# Patient Record
Sex: MALE | Race: WHITE | NOT HISPANIC OR LATINO | URBAN - METROPOLITAN AREA
[De-identification: names, ages, dates, MRNs, and addresses within clinical notes are randomized per-mention and may not be internally consistent; named-entity substitution may affect disease eponyms.]

---

## 2017-02-22 ENCOUNTER — EMERGENCY (EMERGENCY)
Facility: HOSPITAL | Age: 82
LOS: 1 days | Discharge: HOME | End: 2017-02-22
Attending: EMERGENCY MEDICINE | Admitting: EMERGENCY MEDICINE
Payer: COMMERCIAL

## 2017-02-22 VITALS
OXYGEN SATURATION: 99 % | TEMPERATURE: 98 F | HEART RATE: 70 BPM | SYSTOLIC BLOOD PRESSURE: 131 MMHG | RESPIRATION RATE: 18 BRPM | DIASTOLIC BLOOD PRESSURE: 83 MMHG

## 2017-02-22 VITALS
DIASTOLIC BLOOD PRESSURE: 71 MMHG | RESPIRATION RATE: 20 BRPM | SYSTOLIC BLOOD PRESSURE: 152 MMHG | HEART RATE: 81 BPM | TEMPERATURE: 98 F | OXYGEN SATURATION: 98 %

## 2017-02-22 DIAGNOSIS — I12.9 HYPERTENSIVE CHRONIC KIDNEY DISEASE WITH STAGE 1 THROUGH STAGE 4 CHRONIC KIDNEY DISEASE, OR UNSPECIFIED CHRONIC KIDNEY DISEASE: ICD-10-CM

## 2017-02-22 DIAGNOSIS — Z88.0 ALLERGY STATUS TO PENICILLIN: ICD-10-CM

## 2017-02-22 DIAGNOSIS — R55 SYNCOPE AND COLLAPSE: ICD-10-CM

## 2017-02-22 DIAGNOSIS — N18.9 CHRONIC KIDNEY DISEASE, UNSPECIFIED: ICD-10-CM

## 2017-02-22 LAB
ANION GAP SERPL CALC-SCNC: 8 MMOL/L — LOW (ref 9–16)
BUN SERPL-MCNC: 40 MG/DL — HIGH (ref 7–23)
CALCIUM SERPL-MCNC: 9.1 MG/DL — SIGNIFICANT CHANGE UP (ref 8.5–10.5)
CHLORIDE SERPL-SCNC: 107 MMOL/L — SIGNIFICANT CHANGE UP (ref 96–108)
CO2 SERPL-SCNC: 25 MMOL/L — SIGNIFICANT CHANGE UP (ref 22–31)
CREAT SERPL-MCNC: 2.02 MG/DL — HIGH (ref 0.5–1.3)
GLUCOSE SERPL-MCNC: 129 MG/DL — HIGH (ref 70–99)
HCT VFR BLD CALC: 35.2 % — LOW (ref 39–50)
HGB BLD-MCNC: 12.1 G/DL — LOW (ref 13–17)
MCHC RBC-ENTMCNC: 31.3 PG — SIGNIFICANT CHANGE UP (ref 27–34)
MCHC RBC-ENTMCNC: 34.4 G/DL — SIGNIFICANT CHANGE UP (ref 32–36)
MCV RBC AUTO: 91.2 FL — SIGNIFICANT CHANGE UP (ref 80–100)
PLATELET # BLD AUTO: 157 K/UL — SIGNIFICANT CHANGE UP (ref 150–400)
POTASSIUM SERPL-MCNC: 3.7 MMOL/L — SIGNIFICANT CHANGE UP (ref 3.5–5.3)
POTASSIUM SERPL-SCNC: 3.7 MMOL/L — SIGNIFICANT CHANGE UP (ref 3.5–5.3)
RBC # BLD: 3.86 M/UL — LOW (ref 4.2–5.8)
RBC # FLD: 13.3 % — SIGNIFICANT CHANGE UP (ref 10.3–16.9)
SODIUM SERPL-SCNC: 140 MMOL/L — SIGNIFICANT CHANGE UP (ref 135–145)
WBC # BLD: 7 K/UL — SIGNIFICANT CHANGE UP (ref 3.8–10.5)
WBC # FLD AUTO: 7 K/UL — SIGNIFICANT CHANGE UP (ref 3.8–10.5)

## 2017-02-22 PROCEDURE — 99284 EMERGENCY DEPT VISIT MOD MDM: CPT | Mod: 25

## 2017-02-22 PROCEDURE — 85027 COMPLETE CBC AUTOMATED: CPT

## 2017-02-22 PROCEDURE — 84484 ASSAY OF TROPONIN QUANT: CPT

## 2017-02-22 PROCEDURE — 36415 COLL VENOUS BLD VENIPUNCTURE: CPT

## 2017-02-22 PROCEDURE — 93005 ELECTROCARDIOGRAM TRACING: CPT | Mod: 76

## 2017-02-22 PROCEDURE — 80048 BASIC METABOLIC PNL TOTAL CA: CPT

## 2017-02-22 PROCEDURE — 93010 ELECTROCARDIOGRAM REPORT: CPT

## 2017-02-22 RX ORDER — SODIUM CHLORIDE 9 MG/ML
500 INJECTION INTRAMUSCULAR; INTRAVENOUS; SUBCUTANEOUS ONCE
Qty: 0 | Refills: 0 | Status: COMPLETED | OUTPATIENT
Start: 2017-02-22 | End: 2017-02-22

## 2017-02-22 RX ADMIN — SODIUM CHLORIDE 1000 MILLILITER(S): 9 INJECTION INTRAMUSCULAR; INTRAVENOUS; SUBCUTANEOUS at 16:24

## 2017-02-22 RX ADMIN — SODIUM CHLORIDE 1000 MILLILITER(S): 9 INJECTION INTRAMUSCULAR; INTRAVENOUS; SUBCUTANEOUS at 15:56

## 2017-02-22 NOTE — ED PROVIDER NOTE - PHYSICAL EXAMINATION
CONSTITUTIONAL: Well appearing, well nourished, awake, alert and in no apparent distress.  ENMT: Airway patent.  HEAD: Head is atraumatic. Head shape is symmetrical.  EYES: Clear bilaterally. Normal EOMI.  CARDIAC: Normal rate, regular rhythm.  Heart sounds S1, S2.   RESPIRATORY: Breath sounds clear and equal bilaterally.  GASTROINTESTINAL: Abdomen soft, non-tender, no guarding.  MUSCULOSKELETAL: Spine appears normal, range of motion is not limited, no muscle or joint tenderness.   NEUROLOGICAL: Alert and oriented, no focal deficits, no motor or sensory deficits.  SKIN: Skin normal color for race, warm, dry and intact. No evidence of rash.  PSYCHIATRIC: Alert and oriented to person, place, time/situation. normal mood and affect. no apparent risk to self or others.

## 2017-02-22 NOTE — ED PROVIDER NOTE - OBJECTIVE STATEMENT
81 yo male hx of HTN, CKD presenting with episode of syncope while hearing news of wife's diagnosis of brain tumor and need for surgery. Stated did not have much to eat this morning including breakfast. Felt slightly nauseas, was aware that he was about to pass out and felt lightgheaded. daughter with pt then had pt sit in chair, was placed in stretcher, shortly regaining consciousness after a few seconds. States feels b ack to baseline now after fluid hydration and eating. 83 yo male hx of HTN, CKD presenting with episode of syncope while hearing news of wife's diagnosis of brain tumor and need for surgery. Stated did not have much to eat this morning including breakfast. Felt slightly nauseas, was aware that he was about to pass out and felt lightgheaded. daughter with pt then had pt sit in chair, was placed in stretcher, shortly regaining consciousness after a few seconds. States feels b ack to baseline now after fluid hydration and eating. no cp, sob, no prior cardiac hx.

## 2017-02-22 NOTE — ED PROVIDER NOTE - MEDICAL DECISION MAKING DETAILS
Pt with episode of syncope while hearing news regarding wife, appearing prodromal in nature, back to baseline. No criteria for SF syncope rule. Recommended admission, pt refusing, Cr at baseline ~ 1.98 on prior records from daughter. Pt with episode of syncope while hearing news regarding wife, appearing prodromal in nature, back to baseline. No criteria for SF syncope rule. Recommended admission for continued monitoring, pt stating he prefers to be with wife at this time at [facility]. symptoms most suggestive of vasovagal syncope.  Cr at baseline ~ 1.98 on prior records from daughter. Tolerating po, ambulating, given IVF here, strict return precautions given.

## 2017-02-22 NOTE — ED ADULT NURSE NOTE - CHPI ED SYMPTOMS NEG
no nausea/no vomiting/no shortness of breath/no diaphoresis/no back pain/no fever/no cough/no chills

## 2018-08-07 ENCOUNTER — NON-APPOINTMENT (OUTPATIENT)
Age: 83
End: 2018-08-07

## 2018-08-07 ENCOUNTER — APPOINTMENT (OUTPATIENT)
Dept: INTERNAL MEDICINE | Facility: CLINIC | Age: 83
End: 2018-08-07
Payer: COMMERCIAL

## 2018-08-07 ENCOUNTER — LABORATORY RESULT (OUTPATIENT)
Age: 83
End: 2018-08-07

## 2018-08-07 VITALS
BODY MASS INDEX: 21.26 KG/M2 | OXYGEN SATURATION: 99 % | SYSTOLIC BLOOD PRESSURE: 136 MMHG | HEIGHT: 63 IN | WEIGHT: 120 LBS | TEMPERATURE: 98.2 F | HEART RATE: 66 BPM | DIASTOLIC BLOOD PRESSURE: 56 MMHG

## 2018-08-07 DIAGNOSIS — Z11.1 ENCOUNTER FOR SCREENING FOR RESPIRATORY TUBERCULOSIS: ICD-10-CM

## 2018-08-07 DIAGNOSIS — Z00.00 ENCOUNTER FOR GENERAL ADULT MEDICAL EXAMINATION W/OUT ABNORMAL FINDINGS: ICD-10-CM

## 2018-08-07 DIAGNOSIS — R07.89 OTHER CHEST PAIN: ICD-10-CM

## 2018-08-07 PROCEDURE — 36415 COLL VENOUS BLD VENIPUNCTURE: CPT

## 2018-08-07 PROCEDURE — 93000 ELECTROCARDIOGRAM COMPLETE: CPT

## 2018-08-07 PROCEDURE — 99387 INIT PM E/M NEW PAT 65+ YRS: CPT | Mod: 25

## 2018-08-07 PROCEDURE — 86580 TB INTRADERMAL TEST: CPT

## 2018-08-07 NOTE — HEALTH RISK ASSESSMENT
[Good] : ~his/her~  mood as  good [] : No [No falls in past year] : Patient reported no falls in the past year [0] : 2) Feeling down, depressed, or hopeless: Not at all (0) [Patient declined colonoscopy] : Patient declined colonoscopy [Hepatitis C test offered] : Hepatitis C test offered [Change in mental status noted] : No change in mental status noted [With Significant Other] : lives with significant other [Retired] : retired [] :  [# Of Children ___] : has [unfilled] children [Sexually Active] : not sexually active [Feels Safe at Home] : Feels safe at home [Fully functional (bathing, dressing, toileting, transferring, walking, feeding)] : Fully functional (bathing, dressing, toileting, transferring, walking, feeding) [Fully functional (using the telephone, shopping, preparing meals, housekeeping, doing laundry, using] : Fully functional and needs no help or supervision to perform IADLs (using the telephone, shopping, preparing meals, housekeeping, doing laundry, using transportation, managing medications and managing finances) [Reports changes in hearing] : Reports no changes in hearing [Reports changes in vision] : Reports no changes in vision [Reports changes in dental health] : Reports no changes in dental health [Smoke Detector] : smoke detector [Safety elements used in home] : safety elements used in home [Seat Belt] :  uses seat belt [Sunscreen] : uses sunscreen [TB Exposure] : is not being exposed to tuberculosis [Patient declined discussion] : Patient declined discussion

## 2018-08-07 NOTE — HISTORY OF PRESENT ILLNESS
[Family Member] : family member [FreeTextEntry1] : The this is an 84-year-old male who presents today to meet me as his primary internist in for initial examination. [de-identified] : Requires forms filled out to apply for assisted living housing

## 2018-08-07 NOTE — PHYSICAL EXAM
[No Acute Distress] : no acute distress [Well Nourished] : well nourished [Well Developed] : well developed [Well-Appearing] : well-appearing [Normal Sclera/Conjunctiva] : normal sclera/conjunctiva [PERRL] : pupils equal round and reactive to light [EOMI] : extraocular movements intact [Normal Outer Ear/Nose] : the outer ears and nose were normal in appearance [Normal Oropharynx] : the oropharynx was normal [No JVD] : no jugular venous distention [Supple] : supple [No Lymphadenopathy] : no lymphadenopathy [Thyroid Normal, No Nodules] : the thyroid was normal and there were no nodules present [No Respiratory Distress] : no respiratory distress  [Clear to Auscultation] : lungs were clear to auscultation bilaterally [No Accessory Muscle Use] : no accessory muscle use [Normal Rate] : normal rate  [Regular Rhythm] : with a regular rhythm [Normal S1, S2] : normal S1 and S2 [No Murmur] : no murmur heard [No Carotid Bruits] : no carotid bruits [No Varicosities] : no varicosities [No Extremity Clubbing/Cyanosis] : no extremity clubbing/cyanosis [Soft] : abdomen soft [Non Tender] : non-tender [Non-distended] : non-distended [No Masses] : no abdominal mass palpated [No HSM] : no HSM [Normal Bowel Sounds] : normal bowel sounds [Normal Posterior Cervical Nodes] : no posterior cervical lymphadenopathy [Normal Anterior Cervical Nodes] : no anterior cervical lymphadenopathy [No CVA Tenderness] : no CVA  tenderness [No Spinal Tenderness] : no spinal tenderness [No Joint Swelling] : no joint swelling [Grossly Normal Strength/Tone] : grossly normal strength/tone [No Rash] : no rash [Normal Gait] : normal gait [Coordination Grossly Intact] : coordination grossly intact [No Focal Deficits] : no focal deficits [Deep Tendon Reflexes (DTR)] : deep tendon reflexes were 2+ and symmetric [Normal Affect] : the affect was normal [Normal Insight/Judgement] : insight and judgment were intact [de-identified] : Poor dentition [de-identified] : 1+ pedal edema

## 2018-08-07 NOTE — ASSESSMENT
[FreeTextEntry1] : Health maintenance.\par His BMI is good and no weight loss is currently recommended.\par Low level daily aerobic exercise as tolerated they suggested.\par No STD risks or substance abuse per patient report.\par Hepatitis C antibody sent with patient approval.\par Occasional gender specific self-examination is suggested.\par Dermatologic followup for skin screening is suggested especially with history of prior squamous cell carcinoma.\par Patient opts out of future colonoscopy.\par No depression. Competent with ADLs.\par Patient states has completed pneumococcal vaccine series.\par Yearly flu vaccine and shingles vaccine are recommended.\par Application forms for assisted living housing completed.Her

## 2018-08-08 LAB
APPEARANCE: CLEAR
BASOPHILS # BLD AUTO: 0.01 K/UL
BASOPHILS NFR BLD AUTO: 0.2 %
BILIRUBIN URINE: NEGATIVE
BLOOD URINE: NEGATIVE
COLOR: YELLOW
EOSINOPHIL # BLD AUTO: 0.08 K/UL
EOSINOPHIL NFR BLD AUTO: 1.6 %
GLUCOSE QUALITATIVE U: NEGATIVE MG/DL
HBA1C MFR BLD HPLC: 5.5 %
HCT VFR BLD CALC: 30.9 %
HGB BLD-MCNC: 10.4 G/DL
IMM GRANULOCYTES NFR BLD AUTO: 0.4 %
KETONES URINE: NEGATIVE
LEUKOCYTE ESTERASE URINE: NEGATIVE
LYMPHOCYTES # BLD AUTO: 1.32 K/UL
LYMPHOCYTES NFR BLD AUTO: 26.4 %
MAN DIFF?: NORMAL
MCHC RBC-ENTMCNC: 32.1 PG
MCHC RBC-ENTMCNC: 33.7 GM/DL
MCV RBC AUTO: 95.4 FL
MONOCYTES # BLD AUTO: 0.29 K/UL
MONOCYTES NFR BLD AUTO: 5.8 %
NEUTROPHILS # BLD AUTO: 3.28 K/UL
NEUTROPHILS NFR BLD AUTO: 65.6 %
NITRITE URINE: NEGATIVE
PH URINE: 5
PLATELET # BLD AUTO: 166 K/UL
PROTEIN URINE: 30 MG/DL
PSA SERPL-MCNC: <0.01 NG/ML
RBC # BLD: 3.24 M/UL
RBC # FLD: 14.3 %
SPECIFIC GRAVITY URINE: 1.02
UROBILINOGEN URINE: NEGATIVE MG/DL
WBC # FLD AUTO: 5 K/UL

## 2018-08-09 LAB
ALBUMIN SERPL ELPH-MCNC: 4.4 G/DL
ALP BLD-CCNC: 76 U/L
ALT SERPL-CCNC: 14 U/L
ANION GAP SERPL CALC-SCNC: 17 MMOL/L
AST SERPL-CCNC: 19 U/L
BILIRUB SERPL-MCNC: 0.3 MG/DL
BUN SERPL-MCNC: 47 MG/DL
CALCIUM SERPL-MCNC: 9.6 MG/DL
CHLORIDE SERPL-SCNC: 102 MMOL/L
CHOLEST SERPL-MCNC: 162 MG/DL
CHOLEST/HDLC SERPL: 2.3 RATIO
CO2 SERPL-SCNC: 22 MMOL/L
CREAT SERPL-MCNC: 3.04 MG/DL
GLUCOSE SERPL-MCNC: 65 MG/DL
HDLC SERPL-MCNC: 72 MG/DL
LDLC SERPL CALC-MCNC: 66 MG/DL
POTASSIUM SERPL-SCNC: 4.1 MMOL/L
PROT SERPL-MCNC: 6.8 G/DL
SODIUM SERPL-SCNC: 141 MMOL/L
TRIGL SERPL-MCNC: 122 MG/DL

## 2018-09-10 ENCOUNTER — EMERGENCY (EMERGENCY)
Facility: HOSPITAL | Age: 83
LOS: 1 days | Discharge: HOME | End: 2018-09-10
Attending: EMERGENCY MEDICINE | Admitting: EMERGENCY MEDICINE
Payer: COMMERCIAL

## 2018-09-10 VITALS
HEART RATE: 71 BPM | RESPIRATION RATE: 18 BRPM | OXYGEN SATURATION: 96 % | TEMPERATURE: 98 F | DIASTOLIC BLOOD PRESSURE: 73 MMHG | SYSTOLIC BLOOD PRESSURE: 149 MMHG | WEIGHT: 123.02 LBS | HEIGHT: 67 IN

## 2018-09-10 VITALS
RESPIRATION RATE: 18 BRPM | SYSTOLIC BLOOD PRESSURE: 142 MMHG | DIASTOLIC BLOOD PRESSURE: 70 MMHG | OXYGEN SATURATION: 99 % | HEART RATE: 75 BPM | TEMPERATURE: 98 F
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
## 2018-09-10 DIAGNOSIS — Z23 ENCOUNTER FOR IMMUNIZATION: ICD-10-CM
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
## 2018-09-10 DIAGNOSIS — S01.81XA LACERATION WITHOUT FOREIGN BODY OF OTHER PART OF HEAD, INITIAL ENCOUNTER: ICD-10-CM

## 2018-09-10 DIAGNOSIS — Z88.0 ALLERGY STATUS TO PENICILLIN: ICD-10-CM

## 2018-09-10 DIAGNOSIS — S09.90XA UNSPECIFIED INJURY OF HEAD, INITIAL ENCOUNTER: ICD-10-CM

## 2018-09-10 PROCEDURE — 13132 CMPLX RPR F/C/C/M/N/AX/G/H/F: CPT

## 2018-09-10 PROCEDURE — 99284 EMERGENCY DEPT VISIT MOD MDM: CPT | Mod: 25

## 2018-09-10 PROCEDURE — 70450 CT HEAD/BRAIN W/O DYE: CPT | Mod: 26

## 2018-09-10 PROCEDURE — 90471 IMMUNIZATION ADMIN: CPT

## 2018-09-10 PROCEDURE — 70450 CT HEAD/BRAIN W/O DYE: CPT

## 2018-09-10 PROCEDURE — 90715 TDAP VACCINE 7 YRS/> IM: CPT

## 2018-09-10 RX ORDER — TETANUS TOXOID, REDUCED DIPHTHERIA TOXOID AND ACELLULAR PERTUSSIS VACCINE, ADSORBED 5; 2.5; 8; 8; 2.5 [IU]/.5ML; [IU]/.5ML; UG/.5ML; UG/.5ML; UG/.5ML
0.5 SUSPENSION INTRAMUSCULAR ONCE
Qty: 0 | Refills: 0 | Status: COMPLETED | OUTPATIENT
Start: 2018-09-10 | End: 2018-09-10

## 2018-09-10 RX ADMIN — TETANUS TOXOID, REDUCED DIPHTHERIA TOXOID AND ACELLULAR PERTUSSIS VACCINE, ADSORBED 0.5 MILLILITER(S): 5; 2.5; 8; 8; 2.5 SUSPENSION INTRAMUSCULAR at 17:14

## 2018-09-10 NOTE — ED PROVIDER NOTE - ENMT, MLM
Airway patent, Nasal mucosa clear. Mouth with normal mucosa. 4 cm crescent shaped lac through muscle layer right forehead above eyebrow

## 2018-09-10 NOTE — ED PROVIDER NOTE - OBJECTIVE STATEMENT
here with head injury.  States be bent forward to pick something up, lost balance, and fell forward hitting head on edge of shelf.  Bled a lot.  No loc.  Last tetanus unknown.  Denies other injury.  Notes dull frontal headache.  No vomiting.  Not on blood thinner

## 2018-09-10 NOTE — ED ADULT NURSE NOTE - NSIMPLEMENTINTERV_GEN_ALL_ED
Implemented All Universal Safety Interventions:  [place] to call system. Call bell, personal items and telephone within reach. Instruct patient to call for assistance. Room bathroom lighting operational. Non-slip footwear when patient is off stretcher. Physically safe environment: no spills, clutter or unnecessary equipment. Stretcher in lowest position, wheels locked, appropriate side rails in place.

## 2018-09-10 NOTE — ED PROVIDER NOTE - MEDICAL DECISION MAKING DETAILS
mechanical fall with larger forehead laceration.  offered plastic surgery repair but declined.  tetanus updated.  ct head neg for bleed.

## 2018-09-10 NOTE — ED ADULT NURSE NOTE - CAS DISCH TRANSFER METHOD
1.  Patient is to continue her current diet, medications and activity. 2.  Patient is to continue with her caretakers as she is doing. 3.  Patient is also to continue with her visiting nurses, physical therapy and Occupational Therapy.   4.  I will plan s
Private car

## 2018-09-10 NOTE — ED ADULT TRIAGE NOTE - OTHER COMPLAINTS
No LOC, no blood thinner. Pt C/O dull pain to head. Pt denies vomiting, vision change, dizziness, chest pain, SOB.

## 2018-09-10 NOTE — ED ADULT NURSE NOTE - OBJECTIVE STATEMENT
85 y/o male presents to ED c/o frontal head pain s/p head injury. Pt states, "I was bending down and hit my head on a metal shelf." Denies LOC. Denies blood thinner use. Denies deformity. Denies confusion.

## 2018-09-10 NOTE — ED ADULT NURSE NOTE - CHPI ED NUR SYMPTOMS NEG
no dizziness/no change in level of consciousness/no loss of consciousness/no syncope/no blurred vision/no nausea/no seizure/no vomiting/no weakness

## 2018-10-02 ENCOUNTER — APPOINTMENT (OUTPATIENT)
Dept: NEUROLOGY | Facility: CLINIC | Age: 83
End: 2018-10-02
Payer: COMMERCIAL

## 2018-10-02 VITALS
OXYGEN SATURATION: 99 % | HEART RATE: 62 BPM | WEIGHT: 120 LBS | DIASTOLIC BLOOD PRESSURE: 71 MMHG | SYSTOLIC BLOOD PRESSURE: 120 MMHG | BODY MASS INDEX: 21.26 KG/M2 | HEIGHT: 63 IN

## 2018-10-02 PROCEDURE — 99205 OFFICE O/P NEW HI 60 MIN: CPT

## 2018-10-03 LAB
25(OH)D3 SERPL-MCNC: 31.7 NG/ML
FOLATE SERPL-MCNC: 9.8 NG/ML
T PALLIDUM AB SER QL IA: NEGATIVE
TSH SERPL-ACNC: 1.67 UIU/ML
VIT B12 SERPL-MCNC: 561 PG/ML

## 2018-10-05 LAB
COPPER SERPL-MCNC: 92 UG/DL
ZINC SERPL-MCNC: 84 UG/DL

## 2018-10-09 LAB
A-TOCOPHEROL VIT E SERPL-MCNC: 12.5 MG/L
BETA+GAMMA TOCOPHEROL SERPL-MCNC: 0.7 MG/L
HOMOCYSTEINE LEVEL: 39.3 UMOL/L
METHYLMALONIC ACID LEVEL: 307 NMOL/L
NICOTINAMIDE: 63.2 NG/ML
NICOTINIC ACID: <5 NG/ML
VIT B1 SERPL-MCNC: 89.4 NMOL/L
VIT B6 SERPL-MCNC: 5.8 UG/L

## 2018-12-04 ENCOUNTER — APPOINTMENT (OUTPATIENT)
Dept: NEUROLOGY | Facility: CLINIC | Age: 83
End: 2018-12-04
Payer: COMMERCIAL

## 2018-12-04 VITALS
TEMPERATURE: 97.7 F | BODY MASS INDEX: 21.97 KG/M2 | SYSTOLIC BLOOD PRESSURE: 120 MMHG | WEIGHT: 124 LBS | DIASTOLIC BLOOD PRESSURE: 69 MMHG | OXYGEN SATURATION: 99 % | HEIGHT: 63 IN | HEART RATE: 71 BPM

## 2018-12-04 PROCEDURE — 99215 OFFICE O/P EST HI 40 MIN: CPT

## 2018-12-04 RX ORDER — AMLODIPINE BESYLATE 5 MG/1
5 TABLET ORAL DAILY
Refills: 0 | Status: ACTIVE | COMMUNITY
Start: 2018-08-07

## 2018-12-04 RX ORDER — ATORVASTATIN CALCIUM 10 MG/1
10 TABLET, FILM COATED ORAL DAILY
Refills: 0 | Status: ACTIVE | COMMUNITY
Start: 2018-08-07

## 2018-12-04 RX ORDER — FUROSEMIDE 20 MG/1
20 TABLET ORAL DAILY
Refills: 0 | Status: ACTIVE | COMMUNITY
Start: 2018-08-07

## 2019-04-04 ENCOUNTER — APPOINTMENT (OUTPATIENT)
Dept: NEUROLOGY | Facility: CLINIC | Age: 84
End: 2019-04-04
Payer: COMMERCIAL

## 2019-04-04 VITALS — DIASTOLIC BLOOD PRESSURE: 66 MMHG | SYSTOLIC BLOOD PRESSURE: 135 MMHG

## 2019-04-04 VITALS — OXYGEN SATURATION: 98 % | HEART RATE: 70 BPM | WEIGHT: 122 LBS | TEMPERATURE: 97.7 F | BODY MASS INDEX: 21.61 KG/M2

## 2019-04-04 DIAGNOSIS — L98.9 DISORDER OF THE SKIN AND SUBCUTANEOUS TISSUE, UNSPECIFIED: ICD-10-CM

## 2019-04-04 PROCEDURE — 99214 OFFICE O/P EST MOD 30 MIN: CPT

## 2019-04-04 NOTE — HISTORY OF PRESENT ILLNESS
[FreeTextEntry1] : Son is worried about a recent fall and loss of weight, although pt states appetite is good. No side effects from aricept, although son is not sure how well it is working. He reports fluctuations in cognition, mostly worse at night but fluctuates more than that as well. His son is concerned that he tries to get his wife to walk (who has been bedbound for over 9 months with contractures).\par

## 2019-04-04 NOTE — PHYSICAL EXAM
[General Appearance - Alert] : alert [General Appearance - In No Acute Distress] : in no acute distress [Oriented To Time, Place, And Person] : oriented to person, place, and time [Impaired Insight] : insight and judgment were intact [Affect] : the affect was normal [Concentration Intact] : normal concentrating ability [Fluency] : fluency intact [Comprehension] : comprehension intact [Vocabulary] : adequate range of vocabulary [Cranial Nerves Optic (II)] : visual acuity intact bilaterally,  visual fields full to confrontation, pupils equal round and reactive to light [Cranial Nerves Oculomotor (III)] : extraocular motion intact [Cranial Nerves Trigeminal (V)] : facial sensation intact symmetrically [Cranial Nerves Facial (VII)] : face symmetrical [Cranial Nerves Vestibulocochlear (VIII)] : hearing was intact bilaterally [Cranial Nerves Glossopharyngeal (IX)] : tongue and palate midline [Cranial Nerves Accessory (XI - Cranial And Spinal)] : head turning and shoulder shrug symmetric [Cranial Nerves Hypoglossal (XII)] : there was no tongue deviation with protrusion [Motor Tone] : muscle tone was normal in all four extremities [Motor Strength] : muscle strength was normal in all four extremities [Involuntary Movements] : no involuntary movements were seen [No Muscle Atrophy] : normal bulk in all four extremities [Romberg's Sign] : Romberg's sign was negtive [Abnormal Walk] : normal gait [Balance] : balance was intact [Past-pointing] : there was no past-pointing [Tremor] : no tremor present [Coordination - Dysmetria Impaired Finger-to-Nose Bilateral] : not present [1+] : Ankle jerk left 1+ [FreeTextEntry1] : lesion on left upper eyelid / brow with mild medial hyperpigmentation; flatter more hyperpigmented lesion on lleft lower eyelid

## 2019-04-04 NOTE — ASSESSMENT
[FreeTextEntry1] : Increase donepezil to 23mg daily at bedtime\par Refer to dermatology for skin lesion \par Counseled on not trying to walk with wife as it is not safe for her or for him\par Return in 3-4 months

## 2019-07-11 ENCOUNTER — APPOINTMENT (OUTPATIENT)
Dept: NEUROLOGY | Facility: CLINIC | Age: 84
End: 2019-07-11

## 2019-09-18 ENCOUNTER — RX RENEWAL (OUTPATIENT)
Age: 84
End: 2019-09-18

## 2020-01-02 ENCOUNTER — RX RENEWAL (OUTPATIENT)
Age: 85
End: 2020-01-02

## 2020-01-15 RX ORDER — DONEPEZIL HYDROCHLORIDE 23 MG/1
23 TABLET, FILM COATED ORAL
Qty: 30 | Refills: 5 | Status: DISCONTINUED | COMMUNITY
Start: 2018-10-02 | End: 2020-01-15

## 2020-02-08 ENCOUNTER — INPATIENT (INPATIENT)
Facility: HOSPITAL | Age: 85
LOS: 0 days | Discharge: HOME | DRG: 312 | End: 2020-02-09
Attending: INTERNAL MEDICINE | Admitting: INTERNAL MEDICINE
Payer: COMMERCIAL

## 2020-02-08 VITALS
RESPIRATION RATE: 18 BRPM | SYSTOLIC BLOOD PRESSURE: 145 MMHG | TEMPERATURE: 98 F | OXYGEN SATURATION: 99 % | DIASTOLIC BLOOD PRESSURE: 71 MMHG | HEART RATE: 72 BPM

## 2020-02-08 DIAGNOSIS — G30.9 ALZHEIMER'S DISEASE, UNSPECIFIED: ICD-10-CM

## 2020-02-08 DIAGNOSIS — R55 SYNCOPE AND COLLAPSE: ICD-10-CM

## 2020-02-08 DIAGNOSIS — R56.9 UNSPECIFIED CONVULSIONS: ICD-10-CM

## 2020-02-08 DIAGNOSIS — E78.49 OTHER HYPERLIPIDEMIA: ICD-10-CM

## 2020-02-08 DIAGNOSIS — I10 ESSENTIAL (PRIMARY) HYPERTENSION: ICD-10-CM

## 2020-02-08 DIAGNOSIS — Z85.46 PERSONAL HISTORY OF MALIGNANT NEOPLASM OF PROSTATE: Chronic | ICD-10-CM

## 2020-02-08 DIAGNOSIS — N18.9 CHRONIC KIDNEY DISEASE, UNSPECIFIED: ICD-10-CM

## 2020-02-08 LAB
ALBUMIN SERPL ELPH-MCNC: 3.9 G/DL — SIGNIFICANT CHANGE UP (ref 3.3–5)
ALBUMIN SERPL ELPH-MCNC: 4 G/DL — SIGNIFICANT CHANGE UP (ref 3.3–5)
ALP SERPL-CCNC: 80 U/L — SIGNIFICANT CHANGE UP (ref 40–120)
ALP SERPL-CCNC: SIGNIFICANT CHANGE UP U/L (ref 40–120)
ALT FLD-CCNC: 15 U/L — SIGNIFICANT CHANGE UP (ref 10–45)
ALT FLD-CCNC: SIGNIFICANT CHANGE UP U/L (ref 10–45)
ANION GAP SERPL CALC-SCNC: 11 MMOL/L — SIGNIFICANT CHANGE UP (ref 5–17)
ANION GAP SERPL CALC-SCNC: 14 MMOL/L — SIGNIFICANT CHANGE UP (ref 5–17)
APTT BLD: 22.6 SEC — LOW (ref 27.5–36.3)
AST SERPL-CCNC: 17 U/L — SIGNIFICANT CHANGE UP (ref 10–40)
AST SERPL-CCNC: SIGNIFICANT CHANGE UP U/L (ref 10–40)
BASOPHILS # BLD AUTO: 0.04 K/UL — SIGNIFICANT CHANGE UP (ref 0–0.2)
BASOPHILS NFR BLD AUTO: 0.7 % — SIGNIFICANT CHANGE UP (ref 0–2)
BILIRUB SERPL-MCNC: 0.3 MG/DL — SIGNIFICANT CHANGE UP (ref 0.2–1.2)
BILIRUB SERPL-MCNC: 0.3 MG/DL — SIGNIFICANT CHANGE UP (ref 0.2–1.2)
BUN SERPL-MCNC: 49 MG/DL — HIGH (ref 7–23)
BUN SERPL-MCNC: 52 MG/DL — HIGH (ref 7–23)
CALCIUM SERPL-MCNC: 8.9 MG/DL — SIGNIFICANT CHANGE UP (ref 8.4–10.5)
CALCIUM SERPL-MCNC: 9.3 MG/DL — SIGNIFICANT CHANGE UP (ref 8.4–10.5)
CHLORIDE SERPL-SCNC: 106 MMOL/L — SIGNIFICANT CHANGE UP (ref 96–108)
CHLORIDE SERPL-SCNC: 108 MMOL/L — SIGNIFICANT CHANGE UP (ref 96–108)
CO2 SERPL-SCNC: 20 MMOL/L — LOW (ref 22–31)
CO2 SERPL-SCNC: 22 MMOL/L — SIGNIFICANT CHANGE UP (ref 22–31)
CREAT SERPL-MCNC: 3.48 MG/DL — HIGH (ref 0.5–1.3)
CREAT SERPL-MCNC: 3.62 MG/DL — HIGH (ref 0.5–1.3)
EOSINOPHIL # BLD AUTO: 0.09 K/UL — SIGNIFICANT CHANGE UP (ref 0–0.5)
EOSINOPHIL NFR BLD AUTO: 1.6 % — SIGNIFICANT CHANGE UP (ref 0–6)
GLUCOSE SERPL-MCNC: 109 MG/DL — HIGH (ref 70–99)
GLUCOSE SERPL-MCNC: 140 MG/DL — HIGH (ref 70–99)
HCT VFR BLD CALC: 29.9 % — LOW (ref 39–50)
HGB BLD-MCNC: 9.9 G/DL — LOW (ref 13–17)
IMM GRANULOCYTES NFR BLD AUTO: 0.7 % — SIGNIFICANT CHANGE UP (ref 0–1.5)
INR BLD: 0.94 — SIGNIFICANT CHANGE UP (ref 0.88–1.16)
LYMPHOCYTES # BLD AUTO: 1.31 K/UL — SIGNIFICANT CHANGE UP (ref 1–3.3)
LYMPHOCYTES # BLD AUTO: 22.7 % — SIGNIFICANT CHANGE UP (ref 13–44)
MCHC RBC-ENTMCNC: 32.5 PG — SIGNIFICANT CHANGE UP (ref 27–34)
MCHC RBC-ENTMCNC: 33.1 GM/DL — SIGNIFICANT CHANGE UP (ref 32–36)
MCV RBC AUTO: 98 FL — SIGNIFICANT CHANGE UP (ref 80–100)
MONOCYTES # BLD AUTO: 0.37 K/UL — SIGNIFICANT CHANGE UP (ref 0–0.9)
MONOCYTES NFR BLD AUTO: 6.4 % — SIGNIFICANT CHANGE UP (ref 2–14)
NEUTROPHILS # BLD AUTO: 3.92 K/UL — SIGNIFICANT CHANGE UP (ref 1.8–7.4)
NEUTROPHILS NFR BLD AUTO: 67.9 % — SIGNIFICANT CHANGE UP (ref 43–77)
NRBC # BLD: 0 /100 WBCS — SIGNIFICANT CHANGE UP (ref 0–0)
NT-PROBNP SERPL-SCNC: 504 PG/ML — HIGH (ref 0–300)
PLATELET # BLD AUTO: 183 K/UL — SIGNIFICANT CHANGE UP (ref 150–400)
POTASSIUM SERPL-MCNC: 5 MMOL/L — SIGNIFICANT CHANGE UP (ref 3.5–5.3)
POTASSIUM SERPL-MCNC: SIGNIFICANT CHANGE UP MMOL/L (ref 3.5–5.3)
POTASSIUM SERPL-SCNC: 5 MMOL/L — SIGNIFICANT CHANGE UP (ref 3.5–5.3)
POTASSIUM SERPL-SCNC: SIGNIFICANT CHANGE UP MMOL/L (ref 3.5–5.3)
PROT SERPL-MCNC: 6.4 G/DL — SIGNIFICANT CHANGE UP (ref 6–8.3)
PROT SERPL-MCNC: 6.5 G/DL — SIGNIFICANT CHANGE UP (ref 6–8.3)
PROTHROM AB SERPL-ACNC: 10.7 SEC — SIGNIFICANT CHANGE UP (ref 10–12.9)
RBC # BLD: 3.05 M/UL — LOW (ref 4.2–5.8)
RBC # FLD: 13.5 % — SIGNIFICANT CHANGE UP (ref 10.3–14.5)
SODIUM SERPL-SCNC: 140 MMOL/L — SIGNIFICANT CHANGE UP (ref 135–145)
SODIUM SERPL-SCNC: 141 MMOL/L — SIGNIFICANT CHANGE UP (ref 135–145)
TROPONIN T SERPL-MCNC: <0.01 NG/ML — SIGNIFICANT CHANGE UP (ref 0–0.01)
WBC # BLD: 5.77 K/UL — SIGNIFICANT CHANGE UP (ref 3.8–10.5)
WBC # FLD AUTO: 5.77 K/UL — SIGNIFICANT CHANGE UP (ref 3.8–10.5)

## 2020-02-08 PROCEDURE — 93306 TTE W/DOPPLER COMPLETE: CPT | Mod: 26

## 2020-02-08 PROCEDURE — 93010 ELECTROCARDIOGRAM REPORT: CPT

## 2020-02-08 PROCEDURE — 95813 EEG EXTND MNTR 61-119 MIN: CPT | Mod: 26

## 2020-02-08 PROCEDURE — 99221 1ST HOSP IP/OBS SF/LOW 40: CPT

## 2020-02-08 PROCEDURE — 99285 EMERGENCY DEPT VISIT HI MDM: CPT

## 2020-02-08 PROCEDURE — 70450 CT HEAD/BRAIN W/O DYE: CPT | Mod: 26

## 2020-02-08 PROCEDURE — 71045 X-RAY EXAM CHEST 1 VIEW: CPT | Mod: 26

## 2020-02-08 RX ORDER — ATORVASTATIN CALCIUM 80 MG/1
10 TABLET, FILM COATED ORAL AT BEDTIME
Refills: 0 | Status: DISCONTINUED | OUTPATIENT
Start: 2020-02-08 | End: 2020-02-09

## 2020-02-08 RX ORDER — AMLODIPINE BESYLATE 2.5 MG/1
5 TABLET ORAL ONCE
Refills: 0 | Status: COMPLETED | OUTPATIENT
Start: 2020-02-08 | End: 2020-02-08

## 2020-02-08 RX ORDER — SODIUM CHLORIDE 9 MG/ML
1000 INJECTION INTRAMUSCULAR; INTRAVENOUS; SUBCUTANEOUS ONCE
Refills: 0 | Status: COMPLETED | OUTPATIENT
Start: 2020-02-08 | End: 2020-02-08

## 2020-02-08 RX ORDER — INFLUENZA VIRUS VACCINE 15; 15; 15; 15 UG/.5ML; UG/.5ML; UG/.5ML; UG/.5ML
0.5 SUSPENSION INTRAMUSCULAR ONCE
Refills: 0 | Status: DISCONTINUED | OUTPATIENT
Start: 2020-02-08 | End: 2020-02-09

## 2020-02-08 RX ORDER — SODIUM CHLORIDE 9 MG/ML
1000 INJECTION INTRAMUSCULAR; INTRAVENOUS; SUBCUTANEOUS
Refills: 0 | Status: DISCONTINUED | OUTPATIENT
Start: 2020-02-08 | End: 2020-02-09

## 2020-02-08 RX ORDER — AMLODIPINE BESYLATE 2.5 MG/1
5 TABLET ORAL DAILY
Refills: 0 | Status: DISCONTINUED | OUTPATIENT
Start: 2020-02-08 | End: 2020-02-09

## 2020-02-08 RX ADMIN — AMLODIPINE BESYLATE 5 MILLIGRAM(S): 2.5 TABLET ORAL at 20:33

## 2020-02-08 RX ADMIN — ATORVASTATIN CALCIUM 10 MILLIGRAM(S): 80 TABLET, FILM COATED ORAL at 21:38

## 2020-02-08 RX ADMIN — SODIUM CHLORIDE 75 MILLILITER(S): 9 INJECTION INTRAMUSCULAR; INTRAVENOUS; SUBCUTANEOUS at 19:12

## 2020-02-08 RX ADMIN — SODIUM CHLORIDE 500 MILLILITER(S): 9 INJECTION INTRAMUSCULAR; INTRAVENOUS; SUBCUTANEOUS at 11:40

## 2020-02-08 RX ADMIN — AMLODIPINE BESYLATE 5 MILLIGRAM(S): 2.5 TABLET ORAL at 21:38

## 2020-02-08 NOTE — H&P ADULT - ATTENDING COMMENTS
On Date 2/8/20  Assessment: Patient personally seen and examined myself during rounds, face-to-face, with the NPP     Note read, including vitals, physical findings, laboratory data, and radiological reports.   Agree with above with revisions, if any included below.   - My exam:  General: WN/WD NAD  Neurology: A&Ox3, nonfocal, KRUEGER x 4  Head:  Normocephalic, atraumatic  ENT:  Mucosa moist, no ulcerations  Neck:  Supple, no sinuses or palpable masses  Lymphatic:  No palpable cervical, supraclavicular, axillary or inguinal adenopathy  Respiratory: CTA B/L  CV: RRR, S1S2, no murmur  Abdominal: Soft, NT, ND no palpable mass  MSK: No edema, + peripheral pulses, FROM all 4 extremity  Incisions: intact, no erythema or drainage    - My Plan of care:   Continuous telemetry monitoring, frequent hemodynamic checks, daily weights, I/Os, daily 12 Lead ECG, add K and Mg to labs, cycle troponin to peak, consider Adv HF / EP / CTS / Interv. Cardio consultation if intervention is needed.

## 2020-02-08 NOTE — H&P ADULT - NSHPPHYSICALEXAM_GEN_ALL_CORE
GENERAL: in no acute distress  HEAD/NECK: supple, no JVD, no bruits  Skin: multiple keratotic lesions noted on the abdomen  LUNGS: CTA A/P BL  HEART: S1S2 RRR  ABD: + BS, soft, NT/ND  EXT: no edema/cyanosis  AxO x 2 (to himself and location), CN II-XII intact

## 2020-02-08 NOTE — ED ADULT NURSE NOTE - OBJECTIVE STATEMENT
Pt presents to ED complaining of syncope. As per pt's home health aide, "He was sitting in a chair and he started to shake and he passed out". Did not fall, did not hit head. Pt A&Ox2 at baseline. Pt able to move all extremities, able to follow commands/ answer questions. Skin intact, no obvious injuries or deformities noted. Denies pain, dizziness, sob.

## 2020-02-08 NOTE — ED ADULT TRIAGE NOTE - CHIEF COMPLAINT QUOTE
Patient biba from home with Aide s/p syncopal event. Per EMS, patient was seated and had a 30 second syncopal episodes. EMS states his arm got tense and he started shaking prior to LOC. Patient slumped over in seat without hitting his head.

## 2020-02-08 NOTE — H&P ADULT - NSHPOUTPATIENTPROVIDERS_GEN_ALL_CORE
[facility] [phone]  Dr. Olayinka Melendez (nephrology) in [place] (has not seen him in two years)

## 2020-02-08 NOTE — H&P ADULT - HISTORY OF PRESENT ILLNESS
This is an 84 yo male with Alzheimer's (Aricept was discontinued two weeks ago due to vomiting reaction), AxO x 2 (to name and location), Prostate CA in past, HTN, hyperlipidemia, Squamous Cell Carcinoma, CRI (baseline Cr 3.04) (one kidney smaller than another, has not seen a nephrologist since he moved from [facility] a few years ago), takes Furosemide daily however family doesn't know why, who was brought in by his daughter to ED 2/8/20 after pt had an episode of syncope at home. According to his aid, pt became dizzy after he had breakfast and stood up. She thought his left side of the body was "convulsing". He was out for 5-10 seconds. Denies CP, SOB, diaphoresis, headache, abd pain, back pain, weakness, paralysis, sleepiness  In ED he underwent CT head: < from: CT Head No Cont (02.08.20 @ 11:53) >  No acute intracranial hemorrhage, mass effect, or calvarial fracture.   EKG c/w NSR without ischemic changes. Trop negative. Cr 3.62  Echo done bedside in ED: < from: Echocardiogram (02.08.20 @ 13:17) >   1. The left ventricle is normal in size, wall thickness, and systolic function with a calculated ejection fraction of 55%.   2. The right ventricle is normal in size. Right ventricular systolic function is normal.   3. The aortic valve is mildly thickened.   4. There is mild-to-moderate mitral regurgitation.   5. There is mild tricuspid regurgitation.   6. There is no echocardiographic evidence of pulmonary hypertension.   7. There is trace pulmonic regurgitation.   8. No pericardial effusion is seen.      Pt received 1L NS bolus for suspected dehydration and hypovolemia.   Pt is being admitted to 5U telemetry for observation for diagnosis of syncope

## 2020-02-08 NOTE — H&P ADULT - PROBLEM SELECTOR PLAN 5
left message with neurologist Dr. Faust for evaluation of the pt while he is in the hospital pt's neurologist Dr. Faust  - contacting neurology on call for advance dementia

## 2020-02-08 NOTE — ED PROVIDER NOTE - CLINICAL SUMMARY MEDICAL DECISION MAKING FREE TEXT BOX
Pt with unexplained syncope, ekg ok, trop neg, labs with worsening kidney disease vs dehydration, received gentle hydration - ct head no acute findings -discussed with Dr. Aaron who agrees on admission for syncope workup.  Beside official echo in ED.

## 2020-02-08 NOTE — H&P ADULT - ASSESSMENT
86 yo male with Alzheimer's (Aricept 10 mg po daily was discontinued two weeks ago due to vomiting reaction), AxO x 2 (to name and location), Prostate CA in past, HTN, hyperlipidemia, Squamous Cell Carcinoma, CRI (baseline Cr 3.04) (one kidney smaller than another, has not seen a nephrologist since he moved from [facility] a few years ago), takes Furosemide daily however family doesn't know why, who was brought in by his daughter to ED 2/8/20 after pt had an episode of syncope at home. According to his aid, pt became dizzy after he had breakfast and stood up. She thought his left side of the body was "convulsing". He was out for 5-10 seconds. Denies CP, SOB, diaphoresis, headache, abd pain, back pain, weakness, paralysis, sleepiness  In ED he underwent CT head: < from: CT Head No Cont (02.08.20 @ 11:53) >  No acute intracranial hemorrhage, mass effect, or calvarial fracture.   EKG c/w NSR without ischemic changes. Trop negative. Cr 3.62  Echo: normal EF, no wall motion abnormality, no pulm hypertension or pericardial effusion      Pt received 1L NS bolus for suspected dehydration and hypovolemia.   Pt is being admitted to 5U telemetry for observation for diagnosis of syncope    Diet: low cholesterol, low fat  DVT prophylaxis  Ambulate  PT evaluation

## 2020-02-08 NOTE — H&P ADULT - NSICDXPASTMEDICALHX_GEN_ALL_CORE_FT
PAST MEDICAL HISTORY:  Alzheimer disease     Cancer of skin, squamous cell     Chronic kidney disease, unspecified CKD stage     HTN (hypertension)     Hyperlipidemia     No pertinent past medical history     Prostate CA

## 2020-02-08 NOTE — ED PROVIDER NOTE - OBJECTIVE STATEMENT
86 y/o m with PMH of dementia, htn, hld presents to ED with syncope episode.  According to aid, pt had just finished eating and stood up and became dizzy and syncopized for 5-10 seconds.  When pt woke up he was back to baseline.  No incontinence, no sleepiness.  No fever or infectious symptoms.  Pt denies complaints in ED and aid states he is at his baseline.

## 2020-02-09 ENCOUNTER — TRANSCRIPTION ENCOUNTER (OUTPATIENT)
Age: 85
End: 2020-02-09

## 2020-02-09 VITALS — TEMPERATURE: 98 F

## 2020-02-09 LAB
ANION GAP SERPL CALC-SCNC: 10 MMOL/L — SIGNIFICANT CHANGE UP (ref 5–17)
APPEARANCE UR: CLEAR — SIGNIFICANT CHANGE UP
APTT BLD: 28.6 SEC — SIGNIFICANT CHANGE UP (ref 27.5–36.3)
BACTERIA # UR AUTO: SIGNIFICANT CHANGE UP /HPF
BILIRUB UR-MCNC: NEGATIVE — SIGNIFICANT CHANGE UP
BUN SERPL-MCNC: 48 MG/DL — HIGH (ref 7–23)
CALCIUM SERPL-MCNC: 9.2 MG/DL — SIGNIFICANT CHANGE UP (ref 8.4–10.5)
CHLORIDE SERPL-SCNC: 108 MMOL/L — SIGNIFICANT CHANGE UP (ref 96–108)
CHOLEST SERPL-MCNC: 168 MG/DL — SIGNIFICANT CHANGE UP (ref 10–199)
CO2 SERPL-SCNC: 20 MMOL/L — LOW (ref 22–31)
COLOR SPEC: YELLOW — SIGNIFICANT CHANGE UP
CREAT SERPL-MCNC: 3.44 MG/DL — HIGH (ref 0.5–1.3)
DIFF PNL FLD: NEGATIVE — SIGNIFICANT CHANGE UP
EPI CELLS # UR: SIGNIFICANT CHANGE UP /HPF (ref 0–5)
GLUCOSE SERPL-MCNC: 84 MG/DL — SIGNIFICANT CHANGE UP (ref 70–99)
GLUCOSE UR QL: NEGATIVE — SIGNIFICANT CHANGE UP
HBA1C BLD-MCNC: 5 % — SIGNIFICANT CHANGE UP (ref 4–5.6)
HCT VFR BLD CALC: 28.1 % — LOW (ref 39–50)
HDLC SERPL-MCNC: 70 MG/DL — SIGNIFICANT CHANGE UP
HGB BLD-MCNC: 9.1 G/DL — LOW (ref 13–17)
INR BLD: 0.96 — SIGNIFICANT CHANGE UP (ref 0.88–1.16)
KETONES UR-MCNC: NEGATIVE — SIGNIFICANT CHANGE UP
LEUKOCYTE ESTERASE UR-ACNC: NEGATIVE — SIGNIFICANT CHANGE UP
LIPID PNL WITH DIRECT LDL SERPL: 81 MG/DL — SIGNIFICANT CHANGE UP
MAGNESIUM SERPL-MCNC: 2.2 MG/DL — SIGNIFICANT CHANGE UP (ref 1.6–2.6)
MCHC RBC-ENTMCNC: 32 PG — SIGNIFICANT CHANGE UP (ref 27–34)
MCHC RBC-ENTMCNC: 32.4 GM/DL — SIGNIFICANT CHANGE UP (ref 32–36)
MCV RBC AUTO: 98.9 FL — SIGNIFICANT CHANGE UP (ref 80–100)
NITRITE UR-MCNC: NEGATIVE — SIGNIFICANT CHANGE UP
NRBC # BLD: 0 /100 WBCS — SIGNIFICANT CHANGE UP (ref 0–0)
PH UR: 7 — SIGNIFICANT CHANGE UP (ref 5–8)
PLATELET # BLD AUTO: 165 K/UL — SIGNIFICANT CHANGE UP (ref 150–400)
POTASSIUM SERPL-MCNC: 4.2 MMOL/L — SIGNIFICANT CHANGE UP (ref 3.5–5.3)
POTASSIUM SERPL-SCNC: 4.2 MMOL/L — SIGNIFICANT CHANGE UP (ref 3.5–5.3)
PROT UR-MCNC: ABNORMAL MG/DL
PROTHROM AB SERPL-ACNC: 10.9 SEC — SIGNIFICANT CHANGE UP (ref 10–12.9)
RBC # BLD: 2.84 M/UL — LOW (ref 4.2–5.8)
RBC # FLD: 13.3 % — SIGNIFICANT CHANGE UP (ref 10.3–14.5)
RBC CASTS # UR COMP ASSIST: < 5 /HPF — SIGNIFICANT CHANGE UP
SODIUM SERPL-SCNC: 138 MMOL/L — SIGNIFICANT CHANGE UP (ref 135–145)
SP GR SPEC: 1.01 — SIGNIFICANT CHANGE UP (ref 1–1.03)
TOTAL CHOLESTEROL/HDL RATIO MEASUREMENT: 2.4 RATIO — LOW (ref 3.4–9.6)
TRIGL SERPL-MCNC: 84 MG/DL — SIGNIFICANT CHANGE UP (ref 10–149)
TROPONIN T SERPL-MCNC: 0.01 NG/ML — SIGNIFICANT CHANGE UP (ref 0–0.01)
TSH SERPL-MCNC: 1.29 UIU/ML — SIGNIFICANT CHANGE UP (ref 0.35–4.94)
UROBILINOGEN FLD QL: 0.2 E.U./DL — SIGNIFICANT CHANGE UP
WBC # BLD: 6.07 K/UL — SIGNIFICANT CHANGE UP (ref 3.8–10.5)
WBC # FLD AUTO: 6.07 K/UL — SIGNIFICANT CHANGE UP (ref 3.8–10.5)
WBC UR QL: < 5 /HPF — SIGNIFICANT CHANGE UP

## 2020-02-09 PROCEDURE — 85730 THROMBOPLASTIN TIME PARTIAL: CPT

## 2020-02-09 PROCEDURE — 84484 ASSAY OF TROPONIN QUANT: CPT

## 2020-02-09 PROCEDURE — 85027 COMPLETE CBC AUTOMATED: CPT

## 2020-02-09 PROCEDURE — 85610 PROTHROMBIN TIME: CPT

## 2020-02-09 PROCEDURE — 99223 1ST HOSP IP/OBS HIGH 75: CPT

## 2020-02-09 PROCEDURE — 93306 TTE W/DOPPLER COMPLETE: CPT

## 2020-02-09 PROCEDURE — 85025 COMPLETE CBC W/AUTO DIFF WBC: CPT

## 2020-02-09 PROCEDURE — 83880 ASSAY OF NATRIURETIC PEPTIDE: CPT

## 2020-02-09 PROCEDURE — 95713 VEEG 2-12 HR CONT MNTR: CPT

## 2020-02-09 PROCEDURE — 80061 LIPID PANEL: CPT

## 2020-02-09 PROCEDURE — 83036 HEMOGLOBIN GLYCOSYLATED A1C: CPT

## 2020-02-09 PROCEDURE — 99285 EMERGENCY DEPT VISIT HI MDM: CPT

## 2020-02-09 PROCEDURE — 80048 BASIC METABOLIC PNL TOTAL CA: CPT

## 2020-02-09 PROCEDURE — 71045 X-RAY EXAM CHEST 1 VIEW: CPT

## 2020-02-09 PROCEDURE — 93005 ELECTROCARDIOGRAM TRACING: CPT

## 2020-02-09 PROCEDURE — 80053 COMPREHEN METABOLIC PANEL: CPT

## 2020-02-09 PROCEDURE — 70450 CT HEAD/BRAIN W/O DYE: CPT

## 2020-02-09 PROCEDURE — 36415 COLL VENOUS BLD VENIPUNCTURE: CPT

## 2020-02-09 PROCEDURE — 99238 HOSP IP/OBS DSCHRG MGMT 30/<: CPT

## 2020-02-09 PROCEDURE — 84443 ASSAY THYROID STIM HORMONE: CPT

## 2020-02-09 PROCEDURE — 83735 ASSAY OF MAGNESIUM: CPT

## 2020-02-09 PROCEDURE — 95700 EEG CONT REC W/VID EEG TECH: CPT

## 2020-02-09 PROCEDURE — 81001 URINALYSIS AUTO W/SCOPE: CPT

## 2020-02-09 RX ORDER — FUROSEMIDE 40 MG
1 TABLET ORAL
Qty: 0 | Refills: 0 | DISCHARGE

## 2020-02-09 RX ORDER — SODIUM CHLORIDE 9 MG/ML
500 INJECTION INTRAMUSCULAR; INTRAVENOUS; SUBCUTANEOUS ONCE
Refills: 0 | Status: COMPLETED | OUTPATIENT
Start: 2020-02-09 | End: 2020-02-09

## 2020-02-09 RX ORDER — ATORVASTATIN CALCIUM 80 MG/1
1 TABLET, FILM COATED ORAL
Qty: 0 | Refills: 0 | DISCHARGE

## 2020-02-09 RX ORDER — AMLODIPINE BESYLATE 2.5 MG/1
1 TABLET ORAL
Qty: 0 | Refills: 0 | DISCHARGE

## 2020-02-09 RX ADMIN — SODIUM CHLORIDE 250 MILLILITER(S): 9 INJECTION INTRAMUSCULAR; INTRAVENOUS; SUBCUTANEOUS at 13:08

## 2020-02-09 RX ADMIN — AMLODIPINE BESYLATE 5 MILLIGRAM(S): 2.5 TABLET ORAL at 05:45

## 2020-02-09 NOTE — DISCHARGE NOTE PROVIDER - NSDCCPCAREPLAN_GEN_ALL_CORE_FT
PRINCIPAL DISCHARGE DIAGNOSIS  Diagnosis: Syncope  Assessment and Plan of Treatment: You were diagnosed with syncope or "passing out". You had an echocardiogram or an ultrasound of your heart which showed no signs of impaired heart function. You were seen by neurologist who reccomended you have an EEG to evaluate for seizures and no seizure activity was found. It is suspected that your syncopal episode was caused by hypovolemia or dehydration caused by taking Furosemide or a "water pill". Please do not continue to take this medication unless otherwise instructed by your cardiologist.      SECONDARY DISCHARGE DIAGNOSES  Diagnosis: Chronic renal insufficiency  Assessment and Plan of Treatment: You have a history of renal insufficiency. You were given fluids through your IV while in the hospital for hydration and to improve your kidney function. Please continue to stay adequately hydrated and follow up with your doctor. PRINCIPAL DISCHARGE DIAGNOSIS  Diagnosis: Syncope  Assessment and Plan of Treatment: You were diagnosed with syncope or "passing out". You had an echocardiogram or an ultrasound of your heart which showed no signs of impaired heart function. You were seen by neurologist who reccomended you have an EEG to evaluate for seizures. It is suspected that your syncopal episode was caused by hypovolemia or dehydration caused by taking Furosemide or a "water pill". Please do not continue to take this medication unless otherwise instructed by your cardiologist.      SECONDARY DISCHARGE DIAGNOSES  Diagnosis: Chronic renal insufficiency  Assessment and Plan of Treatment: You have a history of renal insufficiency. You were given fluids through your IV while in the hospital for hydration and to improve your kidney function. Please continue to stay adequately hydrated and follow up with your doctor. PRINCIPAL DISCHARGE DIAGNOSIS  Diagnosis: Syncope  Assessment and Plan of Treatment: You were diagnosed with syncope or "passing out". You had an echocardiogram or an ultrasound of your heart which showed no signs of impaired heart function. You were seen by neurologist who reccomended you have an EEG to evaluate for seizures which did not show any seizure activity. It is suspected that your syncopal episode was caused by hypovolemia or dehydration caused by taking Furosemide or a "water pill". Please do not continue to take this medication unless otherwise instructed by your cardiologist.      SECONDARY DISCHARGE DIAGNOSES  Diagnosis: Chronic renal insufficiency  Assessment and Plan of Treatment: You have a history of renal insufficiency. You were given fluids through your IV while in the hospital for hydration and to improve your kidney function. Please continue to stay adequately hydrated and follow up with your doctor.

## 2020-02-09 NOTE — DISCHARGE NOTE PROVIDER - HOSPITAL COURSE
This is an 86 yo male with Alzheimer's (Aricept was discontinued two weeks ago due to vomiting reaction), AxO x 2 (to name and location), Prostate CA in past, HTN, hyperlipidemia, Squamous Cell Carcinoma, CRI (baseline Cr 3.04) (one kidney smaller than another, has not seen a nephrologist since he moved from [facility] a few years ago), takes Furosemide daily however family doesn't know why, who was brought in by his daughter to ED 2/8/20 after pt had an episode of syncope at home. According to his aid, pt became dizzy after he had breakfast and stood up. She thought his left side of the body was "convulsing". He was out for 5-10 seconds. Denies CP, SOB, diaphoresis, headache, abd pain, back pain, weakness, paralysis, sleepiness. In ED he underwent CT head revealing no acute intracranial hemorrhage, mass effect, or calvarial fracture. EKG c/w NSR without ischemic changes. Trop negative. Cr 3.62. Echo done bedside in ED revealed LV normal size, EF 55%, RV normal size and function, AV mildly thickened, mild-to-moderate MR, mild TR, no pulmonary HTN, trace [place], no pericardial effusion. Pt received 1L NS bolus for suspected dehydration and hypovolemia. Pt was admitted to 5U telemetry for observation and workup of syncope. Lasix was discontinued due to elevated Cr and h/o syncope. Orthostatics negative. Pt was given NS 75 cc's/ hr overnight and additional 500 cc bolus on 2/9/20, Cr 3.44 improved from 3.62 on 2/8/20. UA negative. Neurology was consulted and EEG was recommended. Pt was seen and examined at bedside, pt without complaints this morning however very confused. VSS. A&O x 2, RRR, S1 and S2, CTA B/L, no wheezes, rales, rhonchi, Abd soft/NT/ND, distal pulses intact. +. No events on tele overnight, Labs unremarkable/stable. Patient seen, examined by cardiology attending as well and is deemed stable for discharge per Dr. Aaron. Pt is to follow up with cardiologist Dr. Aaron on Wednesday 2/12/20 for post discharge check-up. Patient and his son Waldemar instructed to return to emergency room/seek immediate medical attention if symptoms of chest pain, SOB, LOC. Pt's son informed that pt is to discontinue Furosemide, Amlodipine 5mg daily and Atorvastatin 10mg daily per Dr. Aaron. Pt is to be discharged home via taxi with his son where he has private hire [employer]. Pt's son agrees with discharge plan, verbalizes understanding of the information given. Pt recommended to call us with any questions at [phone]. This is an 86 yo male with Alzheimer's (Aricept was discontinued two weeks ago due to vomiting reaction), AxO x 2 (to name and location), Prostate CA in past, HTN, hyperlipidemia, Squamous Cell Carcinoma, CRI (baseline Cr 3.04) (one kidney smaller than another, has not seen a nephrologist since he moved from [facility] a few years ago), takes Furosemide daily however family doesn't know why, who was brought in by his daughter to ED 2/8/20 after pt had an episode of syncope at home. According to his aid, pt became dizzy after he had breakfast and stood up. She thought his left side of the body was "convulsing". He was out for 5-10 seconds. Denies CP, SOB, diaphoresis, headache, abd pain, back pain, weakness, paralysis, sleepiness. In ED he underwent CT head revealing no acute intracranial hemorrhage, mass effect, or calvarial fracture. EKG c/w NSR without ischemic changes. Trop negative. Cr 3.62. Echo done bedside in ED revealed LV normal size, EF 55%, RV normal size and function, AV mildly thickened, mild-to-moderate MR, mild TR, no pulmonary HTN, trace [place], no pericardial effusion. Pt received 1L NS bolus for suspected dehydration and hypovolemia. Pt was admitted to 5U telemetry for observation and workup of syncope. Lasix was discontinued due to elevated Cr and h/o syncope. Orthostatics negative. Pt was given NS 75 cc's/ hr overnight and additional 500 cc bolus on 2/9/20, Cr 3.44 improved from 3.62 on 2/8/20. UA negative. Neurology was consulted and EEG was recommended. Pt was seen and examined at bedside, pt without complaints this morning however very confused. VSS. A&O x 2, RRR, S1 and S2, CTA B/L, no wheezes, rales, rhonchi, Abd soft/NT/ND, distal pulses intact. +. No events on tele overnight, Labs unremarkable/stable. Patient seen, examined by cardiology attending as well and is deemed stable for discharge per Dr. Aaron. Pt is to follow up with cardiologist Dr. Aaron on Wednesday 2/12/20 for post discharge check-up. Patient and his son Waldemar instructed to return to emergency room/seek immediate medical attention if symptoms of chest pain, SOB, LOC. Pt's son informed that pt is to discontinue Furosemide, Amlodipine 5mg daily and Atorvastatin 10mg daily per Dr. Aaron. Pt is to be discharged home via taxi with his son where he has 24 hour private hire [employer]HA. Pt's son agrees with discharge plan, verbalizes understanding of the information given. Pt recommended to call us with any questions at [phone]. This is an 86 yo male with Alzheimer's (Aricept was discontinued two weeks ago due to vomiting reaction), AxO x 2 (to name and location), Prostate CA in past, HTN, hyperlipidemia, Squamous Cell Carcinoma, CRI (baseline Cr 3.04) (one kidney smaller than another, has not seen a nephrologist since he moved from [facility] a few years ago), takes Furosemide daily however family doesn't know why, who was brought in by his daughter to ED 2/8/20 after pt had an episode of syncope at home. According to his aid, pt became dizzy after he had breakfast and stood up. She thought his left side of the body was "convulsing". He was out for 5-10 seconds. Denies CP, SOB, diaphoresis, headache, abd pain, back pain, weakness, paralysis, sleepiness. In ED he underwent CT head revealing no acute intracranial hemorrhage, mass effect, or calvarial fracture. EKG c/w NSR without ischemic changes. Trop negative. Cr 3.62. Echo done bedside in ED revealed LV normal size, EF 55%, RV normal size and function, AV mildly thickened, mild-to-moderate MR, mild TR, no pulmonary HTN, trace [place], no pericardial effusion. Pt received 1L NS bolus for suspected dehydration and hypovolemia. Pt was admitted to 5U telemetry for observation and workup of syncope. Lasix was discontinued due to elevated Cr and h/o syncope. Orthostatics negative. Pt was given NS 75 cc's/ hr overnight and additional 500 cc bolus on 2/9/20, Cr 3.44 improved from 3.62 on 2/8/20. UA negative. Neurology was consulted and EEG was recommended. EEG revealed mild generalized hemisphere slowing suggestive of a similar degree of diffuse or multifocal  dysfunction, no epileptiform activity and no significant clinical events occurred. Pt was seen and examined at bedside, pt without complaints this morning however very confused. VSS. A&O x 2, RRR, S1 and S2, CTA B/L, no wheezes, rales, rhonchi, Abd soft/NT/ND, distal pulses intact. +. No events on tele overnight, Labs unremarkable/stable. Patient seen, examined by cardiology attending as well and is deemed stable for discharge per Dr. Aaron. Pt is to follow up with cardiologist Dr. Aaron on Wednesday 2/12/20 for post discharge check-up. Patient and his son Waldemar instructed to return to emergency room/seek immediate medical attention if symptoms of chest pain, SOB, LOC. Pt's son informed that pt is to discontinue Furosemide, Amlodipine 5mg daily and Atorvastatin 10mg daily per Dr. Aaron. Pt is to be discharged home via taxi with his son where he has 24 hour private hire [employer]. Pt's son agrees with discharge plan, verbalizes understanding of the information given. Pt recommended to call us with any questions at [phone].

## 2020-02-09 NOTE — DISCHARGE NOTE PROVIDER - NSDCFUADDAPPT_GEN_ALL_CORE_FT
Please follow up with Dr. Aaron in the office on Wednesday 2/12/20. Please follow up with Dr. Aaron in the office on Wednesday 2/12/20. Please follow up with Dr. Faust on Tuesday 2/11/20. Please follow up with Dr. Aaron in the office on Wednesday 2/12/20.     Please follow up with Dr. Faust on Tuesday 2/11/20.   [address] [place] [address], [phone]

## 2020-02-09 NOTE — DISCHARGE NOTE NURSING/CASE MANAGEMENT/SOCIAL WORK - NSDCFUADDAPPT_GEN_ALL_CORE_FT
Please follow up with Dr. Aaron in the office on Wednesday 2/12/20.     Please follow up with Dr. Faust on Tuesday 2/11/20.   [address] [place] [address], [phone]

## 2020-02-09 NOTE — DISCHARGE NOTE PROVIDER - CARE PROVIDERS DIRECT ADDRESSES
[email or web address]

## 2020-02-09 NOTE — DISCHARGE NOTE PROVIDER - NSDCMRMEDTOKEN_GEN_ALL_CORE_FT
amLODIPine 5 mg oral tablet: 1 tab(s) orally once a day  atorvastatin 10 mg oral tablet: 1 tab(s) orally once a day  furosemide 20 mg oral tablet: 1 tab(s) orally once a day

## 2020-02-09 NOTE — EEG REPORT - NS EEG TEXT BOX
Health system Department of Neurology  Inpatient Continuous video-Electroencephalography Report    Patient Name:	ILEANA CORCORAN    :	3/9/1934  MRN:	[id number]    Study Start Date/Time:  2020, 12:14:48 AM  Study End Date/Time:	    Referred by: Helen De MD    Brief Clinical History:  ILEANA CORCORAN is a 85 year old Male w/ Alzheimer's dementia and syncope      Diagnosis Code:   R56.9 convulsions/seizure  CPT: 81914 EEG with video 12-26h    Pertinent Medications on Initiation  none    Acquisition Details:  Electroencephalography was acquired using a minimum of 21 channels on an [employer] Neurology system v 8.5.1 with electrode placement according to the standard International 10-20 system following ACNS (American Clinical Neurophysiology Society) guidelines for Long-Term Video EEG monitoring.  Anterior temporal T1 and T2 electrodes were utilized whenever possible.   The [employer] automated spike & seizure detections were all reviewed in detail, in addition to extensive portions of raw EEG.    Day 1: 2020 @ 12:14:48 AM to morning @ 07:00 am  Background:  continuous, with predominantly theta.frequencies.  Symmetry:  No persistent asymmetries of voltage or frequency.  Posterior Dominant Rhythm:  7-8 Hz symmetric, well-organized, and poorly-modulated.  Organization: Rudimentary.  Voltage:  Normal (20+ uV)  Variability: Yes. 						  Reactivity: Yes.  N2 sleep: Symmetric, synchronous spindles and K complexes.  Spontaneous Activity:  No epileptiform discharges.  Periodic/rhythmic activity:  None.  Events:  No electrographic seizures or significant clinical events.  Provocations:  Hyperventilation and Photic stimulation: was not performed.    Daily Summary:    Mild generalized hemisphere slowing suggestive of a similar degree of diffuse or multifocal  dysfunction.  No epileptiform activity and no significant clinical events occurred.    Read by:  Kelley Scott MD

## 2020-02-09 NOTE — DISCHARGE NOTE PROVIDER - CARE PROVIDER_API CALL
Mary Aaron)  Cardiovascular Disease; Internal Medicine  [address] [place] [address]  Phone: [phone]  Fax: [phone]  Follow Up Time: Mary Aaron)  Cardiovascular Disease; Internal Medicine  [address] [address]  Phone: [phone]  Fax: [phone]  Follow Up Time:     Raji Faust)  Neurology; Neuromuscular Medicine  [address], [address] [place] [address]  Phone: [phone]  Fax: [phone]  Follow Up Time:

## 2020-02-09 NOTE — CONSULT NOTE ADULT - SUBJECTIVE AND OBJECTIVE BOX
HPI: 86 yo male with Alzheimer's, Prostate CA in past, HTN, hyperlipidemia, Squamous Cell Carcinoma, CRI, brought in by his daughter to ED 2/8/20 after pt had an episode of syncope at home.     Description per aide who witnessed episode - pt became dizzy after getting up, was helped down to sit on couch, then lost consciousness with eyes open, eyes "rolled back", left head deviation. initially with twitching of left face that progressed to generalized twitching and stiffness of L side of body. Movements lasted seconds, LOC lasted 1min, and pt was confused X1min afterwords. After coming to, pt felt that his left side was harder to move. however, by time EMS arrived, pt was able to get up by himself and was largely at baseline. No TB, no BB dysfunction.    Thought by primary team to may have been related to dehydration from lasix.    Had a prior episode of near-sycope/lightheadedness.    Sees Dr. Faust for dementia.    ROS:  as per hpi    PMHX:  SYNCOPE  Handoff  MEWS Score  Chronic kidney disease, unspecified CKD stage  Cancer of skin, squamous cell  Prostate CA  Alzheimer disease  Hyperlipidemia  HTN (hypertension)  No pertinent past medical history  Syncope  Alzheimer's dementia without behavioral disturbance, unspecified timing of dementia onset  Other hyperlipidemia  Hypertension, unspecified type  Chronic kidney disease, unspecified CKD stage  Syncope, unspecified syncope type  H/O prostate cancer  No significant past surgical history  No significant past surgical history  SYNCOPE  90+  Chronic renal insufficiency      MEDS:  amLODIPine   Tablet 5 milliGRAM(s) Oral daily  atorvastatin 10 milliGRAM(s) Oral at bedtime  influenza   Vaccine 0.5 milliLiter(s) IntraMuscular once  sodium chloride 0.9%. 1000 milliLiter(s) IV Continuous <Continuous>      SHX: nc    penicillins (Rash)      FHX: nc    Vitals:  T(C): 36.7 (02-09-20 @ 13:26), Max: 37.4 (02-08-20 @ 22:24)  HR: 83 (02-09-20 @ 13:07) (68 - 83)  BP: 130/57 (02-09-20 @ 13:07) (121/81 - 185/84)  RR: 18 (02-09-20 @ 13:07) (16 - 18)  SpO2: 97% (02-09-20 @ 08:33) (97% - 98%)    Exam:  alert, severely cognitively impaired, can name/repeat/follow commands  perrl, eomi, no nystagmus, face symmetric, tup at midline.    5/5 throughout.  no pnd.  normal tone.  normal bulk.  no abnormal movements  sensation intact to LT on all 4  coordination intact to FTN, HTS  gait deferred.    Studies:    CTH without e/o acute dz.    veeg without epileptiform pattern.      AP: 86 yo male with Alzheimer's, Prostate CA in past, HTN, hyperlipidemia, Squamous Cell Carcinoma, CRI, brought in by his daughter to ED 2/8/20 after pt had an episode of syncope at home.     Time course and prodromal symptoms are consistent with syncope. While his unilateral symptoms raise some suspicion, this is still more likely to have been syncope and not seizure.    Pt to f/u with Dr. Faust for discussion of long term management.    discussed at length with pt's son at bedside.

## 2020-02-09 NOTE — DISCHARGE NOTE PROVIDER - NSDCFUSCHEDAPPT_GEN_ALL_CORE_FT
ILENAA CORCORAN ; 02/11/2020 ; [facility] [address]   ILEANA CORCORAN ; 03/03/2020 ; [facility] [address] ILEANA CORCORAN ; 02/11/2020 ; [facility] [address]   ILEANA CORCORAN ; 03/03/2020 ; [facility] [address]

## 2020-02-09 NOTE — DISCHARGE NOTE PROVIDER - PROVIDER TOKENS
PROVIDER:[TOKEN:[[id number]]] PROVIDER:[TOKEN:[[id number]]],PROVIDER:[TOKEN:[[id number]]]

## 2020-02-09 NOTE — DISCHARGE NOTE NURSING/CASE MANAGEMENT/SOCIAL WORK - PATIENT PORTAL LINK FT
You can access the FollowMyHealth Patient Portal offered by [facility] by registering at the following website: [email or web address]. By joining [facility]’s FollowMyHealth portal, you will also be able to view your health information using other applications (apps) compatible with our system.

## 2020-02-11 ENCOUNTER — APPOINTMENT (OUTPATIENT)
Dept: NEUROLOGY | Facility: CLINIC | Age: 85
End: 2020-02-11
Payer: COMMERCIAL

## 2020-02-11 VITALS — DIASTOLIC BLOOD PRESSURE: 75 MMHG | SYSTOLIC BLOOD PRESSURE: 153 MMHG

## 2020-02-11 VITALS
TEMPERATURE: 97.5 F | OXYGEN SATURATION: 99 % | HEIGHT: 63 IN | SYSTOLIC BLOOD PRESSURE: 155 MMHG | DIASTOLIC BLOOD PRESSURE: 72 MMHG | WEIGHT: 121 LBS | HEART RATE: 72 BPM | BODY MASS INDEX: 21.44 KG/M2

## 2020-02-11 DIAGNOSIS — R41.3 OTHER AMNESIA: ICD-10-CM

## 2020-02-11 PROCEDURE — 99215 OFFICE O/P EST HI 40 MIN: CPT

## 2020-02-11 RX ORDER — DONEPEZIL HYDROCHLORIDE 5 MG/1
5 TABLET ORAL
Qty: 30 | Refills: 5 | Status: DISCONTINUED | COMMUNITY
Start: 2019-09-18 | End: 2020-02-11

## 2020-02-11 NOTE — HISTORY OF PRESENT ILLNESS
[FreeTextEntry1] : He had an episode of syncope this past weekend\par Aide states that he felt dizzy, eyes rolled down, had "mini convulsions", left side of body went stiff, head turned to the left \par Stroke code was called, head CT negative\par Not hypotensive, Cr close to baseline (3.44)\par \par He's accidentally taken higher doses of aricept, had nausea and some vomiting, other GI upset. Now he is off medication entirely \par He is having more trouble recognizing faces, also is more confused, sometimes gets agitated with the aides\par \par 13 pt review of systems performed and reviewed with patient (General, Eyes, Ears, Cardiovascular, Respiratory, Gastrointestinal, Genitourinary, Musculoskeletal, Skin, Endocrine, Hematologic, Psychiatric, Neurologic)\par Past medical history, surgical history, social history, and family history reviewed with patient\par See scanned document for details

## 2020-02-11 NOTE — ASSESSMENT
[FreeTextEntry1] : Trial of namenda 7mg ER daily\par Discussed prognosis, discussed methods of mitigating distress from cognitive impairment, discussed medication side effects, discussed stages of Alzheimer's and difficulty in predicting time course of decline\par Return in 4 months

## 2020-02-14 DIAGNOSIS — N18.9 CHRONIC KIDNEY DISEASE, UNSPECIFIED: ICD-10-CM

## 2020-02-14 DIAGNOSIS — R55 SYNCOPE AND COLLAPSE: ICD-10-CM

## 2020-02-14 DIAGNOSIS — T50.1X5A ADVERSE EFFECT OF LOOP [HIGH-CEILING] DIURETICS, INITIAL ENCOUNTER: ICD-10-CM

## 2020-02-14 DIAGNOSIS — Z85.828 PERSONAL HISTORY OF OTHER MALIGNANT NEOPLASM OF SKIN: ICD-10-CM

## 2020-02-14 DIAGNOSIS — F02.80 DEMENTIA IN OTHER DISEASES CLASSIFIED ELSEWHERE, UNSPECIFIED SEVERITY, WITHOUT BEHAVIORAL DISTURBANCE, PSYCHOTIC DISTURBANCE, MOOD DISTURBANCE, AND ANXIETY: ICD-10-CM

## 2020-02-14 DIAGNOSIS — G30.9 ALZHEIMER'S DISEASE, UNSPECIFIED: ICD-10-CM

## 2020-02-14 DIAGNOSIS — E86.0 DEHYDRATION: ICD-10-CM

## 2020-02-14 DIAGNOSIS — Z88.0 ALLERGY STATUS TO PENICILLIN: ICD-10-CM

## 2020-02-14 DIAGNOSIS — I12.9 HYPERTENSIVE CHRONIC KIDNEY DISEASE WITH STAGE 1 THROUGH STAGE 4 CHRONIC KIDNEY DISEASE, OR UNSPECIFIED CHRONIC KIDNEY DISEASE: ICD-10-CM

## 2020-02-14 DIAGNOSIS — E78.5 HYPERLIPIDEMIA, UNSPECIFIED: ICD-10-CM

## 2020-02-14 DIAGNOSIS — Z85.46 PERSONAL HISTORY OF MALIGNANT NEOPLASM OF PROSTATE: ICD-10-CM

## 2020-03-03 ENCOUNTER — APPOINTMENT (OUTPATIENT)
Dept: NEUROLOGY | Facility: CLINIC | Age: 85
End: 2020-03-03

## 2020-06-09 ENCOUNTER — APPOINTMENT (OUTPATIENT)
Dept: NEUROLOGY | Facility: CLINIC | Age: 85
End: 2020-06-09

## 2020-07-17 PROBLEM — E78.5 HYPERLIPIDEMIA, UNSPECIFIED: Chronic | Status: ACTIVE | Noted: 2020-02-08

## 2020-07-17 PROBLEM — G30.9 ALZHEIMER'S DISEASE, UNSPECIFIED: Chronic | Status: ACTIVE | Noted: 2020-02-08

## 2020-07-17 PROBLEM — C44.92 SQUAMOUS CELL CARCINOMA OF SKIN, UNSPECIFIED: Chronic | Status: ACTIVE | Noted: 2020-02-08

## 2020-07-17 PROBLEM — N18.9 CHRONIC KIDNEY DISEASE, UNSPECIFIED: Chronic | Status: ACTIVE | Noted: 2020-02-08

## 2020-07-17 PROBLEM — I10 ESSENTIAL (PRIMARY) HYPERTENSION: Chronic | Status: ACTIVE | Noted: 2020-02-08

## 2020-07-17 PROBLEM — C61 MALIGNANT NEOPLASM OF PROSTATE: Chronic | Status: ACTIVE | Noted: 2020-02-08

## 2020-07-29 ENCOUNTER — APPOINTMENT (OUTPATIENT)
Dept: NEPHROLOGY | Facility: CLINIC | Age: 85
End: 2020-07-29
Payer: COMMERCIAL

## 2020-07-29 VITALS
HEART RATE: 76 BPM | DIASTOLIC BLOOD PRESSURE: 54 MMHG | HEIGHT: 63 IN | SYSTOLIC BLOOD PRESSURE: 128 MMHG | WEIGHT: 122 LBS | BODY MASS INDEX: 21.62 KG/M2

## 2020-07-29 DIAGNOSIS — H91.93 UNSPECIFIED HEARING LOSS, BILATERAL: ICD-10-CM

## 2020-07-29 PROCEDURE — 99205 OFFICE O/P NEW HI 60 MIN: CPT

## 2020-07-29 NOTE — REVIEW OF SYSTEMS
[Lower Ext Edema] : lower extremity edema [Feeling Tired] : feeling tired [Nocturia] : nocturia [SOB on Exertion] : shortness of breath during exertion [Incontinence] : incontinence [Joint Stiffness] : joint stiffness [Confused] : confusion [Limb Weakness] : limb weakness [Sleep Disturbances] : sleep disturbances [Negative] : Heme/Lymph

## 2020-07-29 NOTE — HISTORY OF PRESENT ILLNESS
[FreeTextEntry1] : 86-year-old gentleman with a history of dementia, who now has CKD 5, anemia of CKD, long history of hypertension and hyperlipidemia -our visit was with him, his aide Zelette, and concurrently on the phone with his daughter Trish.  His children are highly supportive and involved in his care, which involves 24/7 aids and his nurse Belinda Rowe, [phone].  He has soft uremic symptoms including pruritus and sleep alterations but the latter may be essentially sundowning associated with dementia.  He complains of coldness but has for many years.  He has urine incontinence.  His appetite is well preserved however, and there is no nausea or vomiting.  He takes fluids well and does not appear to be dyspneic.  He has been on furosemide 20 mg daily and amlodipine 5 mg daily.  His neurologist is Dr. Raji Faust, and he does take dementia medications.  He has never had abdominal surgery, only inguinal hernia repair.  Trish and I had a very extended discussion about dialysis in general, the 2 types -hemodialysis versus peritoneal dialysis.  We discussed the fact that for hemo-he would need creation of an AV fistula ideally, in order to avoid a catheter which is likely to become infected and would be uncomfortable.  We also discussed CCPD as an alternative, and I explained that he would need a peritoneal catheter inserted and someone to perform the treatments 7 nights a week.  In addition to all of that, I made her aware that conservative therapy is also an option and that in fact my mother did that at age of 85, after refusing dialysis.  His creatinine 4 weeks ago was 4.57 with a BUN of 66 and a GFR in the low teens, hemoglobin 10.8, K4.5, and albumin 4.1 suggestive of adequate nutrition.

## 2020-07-29 NOTE — PHYSICAL EXAM
[General Appearance - In No Acute Distress] : in no acute distress [Sclera] : the sclera and conjunctiva were normal [General Appearance - Alert] : alert [PERRL With Normal Accommodation] : pupils were equal in size, round, and reactive to light [Outer Ear] : the ears and nose were normal in appearance [Neck Appearance] : the appearance of the neck was normal [Neck Cervical Mass (___cm)] : no neck mass was observed [Jugular Venous Distention Increased] : there was no jugular-venous distention [Heart Rate And Rhythm] : heart rate was normal and rhythm regular [Auscultation Breath Sounds / Voice Sounds] : lungs were clear to auscultation bilaterally [Heart Sounds] : normal S1 and S2 [Murmurs] : no murmurs [Heart Sounds Gallop] : no gallops [Heart Sounds Pericardial Friction Rub] : no pericardial rub [Edema] : there was no peripheral edema [Bowel Sounds] : normal bowel sounds [Abdomen Soft] : soft [Abdomen Tenderness] : non-tender [Abdomen Mass (___ Cm)] : no abdominal mass palpated [Cervical Lymph Nodes Enlarged Posterior Bilaterally] : posterior cervical [Cervical Lymph Nodes Enlarged Anterior Bilaterally] : anterior cervical [Supraclavicular Lymph Nodes Enlarged Bilaterally] : supraclavicular [No CVA Tenderness] : no ~M costovertebral angle tenderness [Abnormal Walk] : normal gait [Nail Clubbing] : no clubbing  or cyanosis of the fingernails [Skin Turgor] : normal skin turgor [Musculoskeletal - Swelling] : no joint swelling seen [Motor Tone] : muscle strength and tone were normal [Skin Color & Pigmentation] : normal skin color and pigmentation [Deep Tendon Reflexes (DTR)] : deep tendon reflexes were 2+ and symmetric [] : no rash [No Focal Deficits] : no focal deficits

## 2020-07-29 NOTE — ASSESSMENT
[FreeTextEntry1] : 86-year-old man with dementia who has stage V CKD but without full-blown uremic symptoms or signs -appetite is good, no nausea or vomiting, no pericardial rub or asterixis.  The major decision here will revolve around the family's choice of how to proceed, and they are very devoted.  I have outlined the major options which include hemodialysis, either by creation of AV fistula or insertion of catheter, OR peritoneal dialysis, probably CCPD with the attendant need for someone to perform the procedure nightly, OR conservative management with no dialysis.  I believe his daughter Trish understands the ramifications of each and that hemodialysis would probably be the toughest for him to endure with 2 needles of large caliber being inserted in his arm 3 times a week and little understanding of what is going on.  In view of his BP of 128/54, I asked his nurse Belinda VENEGAS to reduce his amlodipine dose to 2.5 mg daily because of concern about hypotension and a possible fall.

## 2020-07-30 ENCOUNTER — RX RENEWAL (OUTPATIENT)
Age: 85
End: 2020-07-30

## 2020-08-10 LAB
ANION GAP SERPL CALC-SCNC: 17 MMOL/L
BASOPHILS # BLD AUTO: 0.03 K/UL
BASOPHILS NFR BLD AUTO: 0.5 %
BUN SERPL-MCNC: 65 MG/DL
CALCIUM SERPL-MCNC: 9.7 MG/DL
CALCIUM SERPL-MCNC: 9.7 MG/DL
CHLORIDE SERPL-SCNC: 105 MMOL/L
CO2 SERPL-SCNC: 22 MMOL/L
CREAT SERPL-MCNC: 4.94 MG/DL
EOSINOPHIL # BLD AUTO: 0.19 K/UL
EOSINOPHIL NFR BLD AUTO: 3.2 %
GLUCOSE SERPL-MCNC: 60 MG/DL
HBV SURFACE AG SER QL: NONREACTIVE
HCT VFR BLD CALC: 32.7 %
HCV AB SER QL: NONREACTIVE
HCV S/CO RATIO: 0.06 S/CO
HGB BLD-MCNC: 10.5 G/DL
IMM GRANULOCYTES NFR BLD AUTO: 0.3 %
IRON SATN MFR SERPL: 33 %
IRON SERPL-MCNC: 90 UG/DL
LYMPHOCYTES # BLD AUTO: 1.44 K/UL
LYMPHOCYTES NFR BLD AUTO: 24.6 %
MAN DIFF?: NORMAL
MCHC RBC-ENTMCNC: 31.9 PG
MCHC RBC-ENTMCNC: 32.1 GM/DL
MCV RBC AUTO: 99.4 FL
MONOCYTES # BLD AUTO: 0.43 K/UL
MONOCYTES NFR BLD AUTO: 7.3 %
NEUTROPHILS # BLD AUTO: 3.75 K/UL
NEUTROPHILS NFR BLD AUTO: 64.1 %
PARATHYROID HORMONE INTACT: 354 PG/ML
PHOSPHATE SERPL-MCNC: 3.7 MG/DL
PLATELET # BLD AUTO: 189 K/UL
POTASSIUM SERPL-SCNC: 4.4 MMOL/L
RBC # BLD: 3.29 M/UL
RBC # FLD: 12.7 %
SARS-COV-2 IGG SERPL IA-ACNC: 35.3 INDEX
SARS-COV-2 IGG SERPL QL IA: POSITIVE
SODIUM SERPL-SCNC: 143 MMOL/L
TIBC SERPL-MCNC: 273 UG/DL
UIBC SERPL-MCNC: 183 UG/DL
WBC # FLD AUTO: 5.86 K/UL

## 2020-08-18 ENCOUNTER — INPATIENT (INPATIENT)
Facility: HOSPITAL | Age: 85
LOS: 3 days | Discharge: HOME HEALTH | DRG: 470 | End: 2020-08-22
Attending: ORTHOPAEDIC SURGERY | Admitting: ORTHOPAEDIC SURGERY
Payer: MEDICARE

## 2020-08-18 VITALS
DIASTOLIC BLOOD PRESSURE: 77 MMHG | RESPIRATION RATE: 18 BRPM | SYSTOLIC BLOOD PRESSURE: 156 MMHG | HEART RATE: 88 BPM | OXYGEN SATURATION: 97 % | TEMPERATURE: 98 F

## 2020-08-18 DIAGNOSIS — Z85.46 PERSONAL HISTORY OF MALIGNANT NEOPLASM OF PROSTATE: Chronic | ICD-10-CM

## 2020-08-18 LAB
APPEARANCE UR: CLEAR — SIGNIFICANT CHANGE UP
BILIRUB UR-MCNC: NEGATIVE — SIGNIFICANT CHANGE UP
COLOR SPEC: YELLOW — SIGNIFICANT CHANGE UP
DIFF PNL FLD: ABNORMAL
GLUCOSE UR QL: NEGATIVE — SIGNIFICANT CHANGE UP
KETONES UR-MCNC: NEGATIVE — SIGNIFICANT CHANGE UP
LEUKOCYTE ESTERASE UR-ACNC: NEGATIVE — SIGNIFICANT CHANGE UP
NITRITE UR-MCNC: NEGATIVE — SIGNIFICANT CHANGE UP
PH UR: 6 — SIGNIFICANT CHANGE UP (ref 5–8)
PROT UR-MCNC: 30 MG/DL
SARS-COV-2 RNA SPEC QL NAA+PROBE: SIGNIFICANT CHANGE UP
SP GR SPEC: 1.02 — SIGNIFICANT CHANGE UP (ref 1–1.03)
UROBILINOGEN FLD QL: 0.2 E.U./DL — SIGNIFICANT CHANGE UP

## 2020-08-18 PROCEDURE — 73502 X-RAY EXAM HIP UNI 2-3 VIEWS: CPT | Mod: 26,LT

## 2020-08-18 PROCEDURE — 99285 EMERGENCY DEPT VISIT HI MDM: CPT | Mod: CS

## 2020-08-18 PROCEDURE — 70450 CT HEAD/BRAIN W/O DYE: CPT | Mod: 26

## 2020-08-18 PROCEDURE — 71045 X-RAY EXAM CHEST 1 VIEW: CPT | Mod: 26

## 2020-08-18 RX ORDER — SODIUM CHLORIDE 9 MG/ML
1000 INJECTION INTRAMUSCULAR; INTRAVENOUS; SUBCUTANEOUS
Refills: 0 | Status: DISCONTINUED | OUTPATIENT
Start: 2020-08-18 | End: 2020-08-19

## 2020-08-18 RX ORDER — POVIDONE-IODINE 5 %
1 AEROSOL (ML) TOPICAL ONCE
Refills: 0 | Status: DISCONTINUED | OUTPATIENT
Start: 2020-08-18 | End: 2020-08-22

## 2020-08-18 RX ORDER — ACETAMINOPHEN 500 MG
650 TABLET ORAL EVERY 6 HOURS
Refills: 0 | Status: DISCONTINUED | OUTPATIENT
Start: 2020-08-18 | End: 2020-08-20

## 2020-08-18 RX ORDER — ACETAMINOPHEN 500 MG
1000 TABLET ORAL ONCE
Refills: 0 | Status: COMPLETED | OUTPATIENT
Start: 2020-08-18 | End: 2020-08-18

## 2020-08-18 RX ORDER — TRAMADOL HYDROCHLORIDE 50 MG/1
25 TABLET ORAL EVERY 6 HOURS
Refills: 0 | Status: DISCONTINUED | OUTPATIENT
Start: 2020-08-18 | End: 2020-08-22

## 2020-08-18 RX ADMIN — Medication 1000 MILLIGRAM(S): at 20:15

## 2020-08-18 RX ADMIN — Medication 1000 MILLIGRAM(S): at 21:23

## 2020-08-18 RX ADMIN — Medication 650 MILLIGRAM(S): at 23:45

## 2020-08-18 NOTE — ED ADULT TRIAGE NOTE - CHIEF COMPLAINT QUOTE
BIBA from home s/p unwitnessed fall, per HHA pt found on floor trying to put underwear on. no anticoagulation. hx dementia, pt is a poor historian. c/o L hip pain, +L leg external rotation.

## 2020-08-18 NOTE — H&P ADULT - NSHPLABSRESULTS_GEN_ALL_CORE
LABS:                        10.7   13.83 )-----------( 185      ( 18 Aug 2020 19:07 )             32.5     18 Aug 2020 19:07    142    |  106    |  55     ----------------------------<  171    4.6     |  19     |  5.18     Ca    10.0       18 Aug 2020 19:07    TPro  7.8    /  Alb  4.8    /  TBili  0.4    /  DBili  x      /  AST  15     /  ALT  12     /  AlkPhos  146    18 Aug 2020 19:07    PT/INR - ( 18 Aug 2020 19:07 )   PT: 11.9 sec;   INR: 0.99          PTT - ( 18 Aug 2020 19:07 )  PTT:25.8 sec

## 2020-08-18 NOTE — H&P ADULT - NSHPPHYSICALEXAM_GEN_ALL_CORE
General: well appearing, in NAD    LLE:   3mm shortening noted  no wounds, mild swelling, no ecchymosis   2+ DP pulse   + Log roll  pain w axial compression   SILT over distal limb and symmetric to contralateral side   Firing EHL/FHL/TA/GS

## 2020-08-18 NOTE — ED PROVIDER NOTE - OBJECTIVE STATEMENT
86 M hx of HTN, Alz Dementia- s/p fall- now co L hip pain- unable to walk- but px was able to stand and put his underwear on  px's wife states he did not hit his head  px not on AC  no f/c  co moderate pain to L hip- worse w movement  no sig allev factors

## 2020-08-18 NOTE — ED PROVIDER NOTE - MUSCULOSKELETAL, MLM
Spine appears normal, range of motion is not limited, L hip shortened slightly ext rotated, pain w movement

## 2020-08-18 NOTE — H&P ADULT - HISTORY OF PRESENT ILLNESS
85M with Alzheimer's dementia, Prostate CA in past, HTN, hyperlipidemia, Squamous Cell Carcinoma, CRI (baseline Cr 3.04, based off of previous charts), brought in by aide secondary to fall at home w continued L hip pain. Pt demented at baseline unable to obtain history. Aide told ED providers that pt had a slip and fall onto his left side. denies any head trauma. No open wounds noted.

## 2020-08-18 NOTE — ED ADULT NURSE NOTE - OBJECTIVE STATEMENT
87 yo male BIBA s/p unwitnessed fall at home, HHA found pt. on floor trying to put underwear on, no anticoagulation. Pt. poor historian, intermittent eye contact, illogical speech, short term memory loss present. Oriented to self, disoriented to situation and time. H/O Alzheimer's. +left leg external rotation. Pt. not moaning, guarding or visibly uncomfortable, pt. resting.

## 2020-08-18 NOTE — ED ADULT NURSE NOTE - NSIMPLEMENTINTERV_GEN_ALL_ED
Implemented All Fall with Harm Risk Interventions:  [place] to call system. Call bell, personal items and telephone within reach. Instruct patient to call for assistance. Room bathroom lighting operational. Non-slip footwear when patient is off stretcher. Physically safe environment: no spills, clutter or unnecessary equipment. Stretcher in lowest position, wheels locked, appropriate side rails in place. Provide visual cue, wrist band, yellow gown, etc. Monitor gait and stability. Monitor for mental status changes and reorient to person, place, and time. Review medications for side effects contributing to fall risk. Reinforce activity limits and safety measures with patient and family. Provide visual clues: red socks.

## 2020-08-18 NOTE — ED PROVIDER NOTE - NS ED MD DISPO SPECIAL CONSIDERATION1
Low Suspicion of COVID-19 O-Z Flap Text: The defect edges were debeveled with a #15 scalpel blade.  Given the location of the defect, shape of the defect and the proximity to free margins an O-Z flap was deemed most appropriate.  Using a sterile surgical marker, an appropriate transposition flap was drawn incorporating the defect and placing the expected incisions within the relaxed skin tension lines where possible. The area thus outlined was incised deep to adipose tissue with a #15 scalpel blade.  The skin margins were undermined to an appropriate distance in all directions utilizing iris scissors.

## 2020-08-18 NOTE — ED ADULT NURSE NOTE - PMH
Alzheimer disease    Cancer of skin, squamous cell    Chronic kidney disease, unspecified CKD stage    HTN (hypertension)    Hyperlipidemia    Prostate CA

## 2020-08-18 NOTE — H&P ADULT - ASSESSMENT
86 M w multiple comorb condition w L hip fx   - Admit to ortho  - NPO/IVF @ midnight   - Med clearance   - Pre-op labs   - Nephrology consult   - Pain control

## 2020-08-19 DIAGNOSIS — D64.9 ANEMIA, UNSPECIFIED: ICD-10-CM

## 2020-08-19 DIAGNOSIS — E78.5 HYPERLIPIDEMIA, UNSPECIFIED: ICD-10-CM

## 2020-08-19 DIAGNOSIS — N18.5 CHRONIC KIDNEY DISEASE, STAGE 5: ICD-10-CM

## 2020-08-19 DIAGNOSIS — G30.9 ALZHEIMER'S DISEASE, UNSPECIFIED: ICD-10-CM

## 2020-08-19 DIAGNOSIS — I10 ESSENTIAL (PRIMARY) HYPERTENSION: ICD-10-CM

## 2020-08-19 DIAGNOSIS — Z01.818 ENCOUNTER FOR OTHER PREPROCEDURAL EXAMINATION: ICD-10-CM

## 2020-08-19 DIAGNOSIS — N17.9 ACUTE KIDNEY FAILURE, UNSPECIFIED: ICD-10-CM

## 2020-08-19 DIAGNOSIS — S72.002A FRACTURE OF UNSPECIFIED PART OF NECK OF LEFT FEMUR, INITIAL ENCOUNTER FOR CLOSED FRACTURE: ICD-10-CM

## 2020-08-19 LAB
ALBUMIN SERPL ELPH-MCNC: 4.3 G/DL — SIGNIFICANT CHANGE UP (ref 3.3–5)
ALP SERPL-CCNC: 124 U/L — HIGH (ref 40–120)
ALT FLD-CCNC: 12 U/L — SIGNIFICANT CHANGE UP (ref 10–45)
ANION GAP SERPL CALC-SCNC: 15 MMOL/L — SIGNIFICANT CHANGE UP (ref 5–17)
AST SERPL-CCNC: 14 U/L — SIGNIFICANT CHANGE UP (ref 10–40)
BILIRUB SERPL-MCNC: 0.5 MG/DL — SIGNIFICANT CHANGE UP (ref 0.2–1.2)
BLD GP AB SCN SERPL QL: NEGATIVE — SIGNIFICANT CHANGE UP
BUN SERPL-MCNC: 53 MG/DL — HIGH (ref 7–23)
CALCIUM SERPL-MCNC: 9.5 MG/DL — SIGNIFICANT CHANGE UP (ref 8.4–10.5)
CHLORIDE SERPL-SCNC: 108 MMOL/L — SIGNIFICANT CHANGE UP (ref 96–108)
CO2 SERPL-SCNC: 19 MMOL/L — LOW (ref 22–31)
CREAT SERPL-MCNC: 4.97 MG/DL — HIGH (ref 0.5–1.3)
GLUCOSE SERPL-MCNC: 127 MG/DL — HIGH (ref 70–99)
HCT VFR BLD CALC: 28.3 % — LOW (ref 39–50)
HCT VFR BLD CALC: 30.2 % — LOW (ref 39–50)
HGB BLD-MCNC: 9.4 G/DL — LOW (ref 13–17)
HGB BLD-MCNC: 9.9 G/DL — LOW (ref 13–17)
MCHC RBC-ENTMCNC: 31.6 PG — SIGNIFICANT CHANGE UP (ref 27–34)
MCHC RBC-ENTMCNC: 31.9 PG — SIGNIFICANT CHANGE UP (ref 27–34)
MCHC RBC-ENTMCNC: 32.8 GM/DL — SIGNIFICANT CHANGE UP (ref 32–36)
MCHC RBC-ENTMCNC: 33.2 GM/DL — SIGNIFICANT CHANGE UP (ref 32–36)
MCV RBC AUTO: 95.9 FL — SIGNIFICANT CHANGE UP (ref 80–100)
MCV RBC AUTO: 96.5 FL — SIGNIFICANT CHANGE UP (ref 80–100)
NRBC # BLD: 0 /100 WBCS — SIGNIFICANT CHANGE UP (ref 0–0)
NRBC # BLD: 0 /100 WBCS — SIGNIFICANT CHANGE UP (ref 0–0)
PLATELET # BLD AUTO: 142 K/UL — LOW (ref 150–400)
PLATELET # BLD AUTO: 146 K/UL — LOW (ref 150–400)
POTASSIUM SERPL-MCNC: 4 MMOL/L — SIGNIFICANT CHANGE UP (ref 3.5–5.3)
POTASSIUM SERPL-SCNC: 4 MMOL/L — SIGNIFICANT CHANGE UP (ref 3.5–5.3)
PROT SERPL-MCNC: 7.4 G/DL — SIGNIFICANT CHANGE UP (ref 6–8.3)
RBC # BLD: 2.95 M/UL — LOW (ref 4.2–5.8)
RBC # BLD: 3.13 M/UL — LOW (ref 4.2–5.8)
RBC # FLD: 12.7 % — SIGNIFICANT CHANGE UP (ref 10.3–14.5)
RBC # FLD: 12.9 % — SIGNIFICANT CHANGE UP (ref 10.3–14.5)
RH IG SCN BLD-IMP: POSITIVE — SIGNIFICANT CHANGE UP
SODIUM SERPL-SCNC: 142 MMOL/L — SIGNIFICANT CHANGE UP (ref 135–145)
WBC # BLD: 11.7 K/UL — HIGH (ref 3.8–10.5)
WBC # BLD: 8.27 K/UL — SIGNIFICANT CHANGE UP (ref 3.8–10.5)
WBC # FLD AUTO: 11.7 K/UL — HIGH (ref 3.8–10.5)
WBC # FLD AUTO: 8.27 K/UL — SIGNIFICANT CHANGE UP (ref 3.8–10.5)

## 2020-08-19 PROCEDURE — 73501 X-RAY EXAM HIP UNI 1 VIEW: CPT | Mod: 26,LT

## 2020-08-19 PROCEDURE — 99233 SBSQ HOSP IP/OBS HIGH 50: CPT | Mod: GC

## 2020-08-19 PROCEDURE — 99223 1ST HOSP IP/OBS HIGH 75: CPT

## 2020-08-19 RX ORDER — OXYCODONE HYDROCHLORIDE 5 MG/1
5 TABLET ORAL EVERY 4 HOURS
Refills: 0 | Status: DISCONTINUED | OUTPATIENT
Start: 2020-08-19 | End: 2020-08-19

## 2020-08-19 RX ORDER — SENNA PLUS 8.6 MG/1
2 TABLET ORAL AT BEDTIME
Refills: 0 | Status: DISCONTINUED | OUTPATIENT
Start: 2020-08-19 | End: 2020-08-22

## 2020-08-19 RX ORDER — POLYETHYLENE GLYCOL 3350 17 G/17G
17 POWDER, FOR SOLUTION ORAL DAILY
Refills: 0 | Status: DISCONTINUED | OUTPATIENT
Start: 2020-08-19 | End: 2020-08-22

## 2020-08-19 RX ORDER — ASPIRIN/CALCIUM CARB/MAGNESIUM 324 MG
325 TABLET ORAL
Refills: 0 | Status: DISCONTINUED | OUTPATIENT
Start: 2020-08-19 | End: 2020-08-22

## 2020-08-19 RX ORDER — HYDROMORPHONE HYDROCHLORIDE 2 MG/ML
0.5 INJECTION INTRAMUSCULAR; INTRAVENOUS; SUBCUTANEOUS
Refills: 0 | Status: DISCONTINUED | OUTPATIENT
Start: 2020-08-19 | End: 2020-08-20

## 2020-08-19 RX ORDER — OXYCODONE HYDROCHLORIDE 5 MG/1
10 TABLET ORAL EVERY 4 HOURS
Refills: 0 | Status: DISCONTINUED | OUTPATIENT
Start: 2020-08-19 | End: 2020-08-20

## 2020-08-19 RX ORDER — CELECOXIB 200 MG/1
200 CAPSULE ORAL
Refills: 0 | Status: DISCONTINUED | OUTPATIENT
Start: 2020-08-21 | End: 2020-08-21

## 2020-08-19 RX ORDER — SODIUM CHLORIDE 9 MG/ML
1000 INJECTION, SOLUTION INTRAVENOUS
Refills: 0 | Status: DISCONTINUED | OUTPATIENT
Start: 2020-08-19 | End: 2020-08-20

## 2020-08-19 RX ORDER — PANTOPRAZOLE SODIUM 20 MG/1
40 TABLET, DELAYED RELEASE ORAL DAILY
Refills: 0 | Status: DISCONTINUED | OUTPATIENT
Start: 2020-08-19 | End: 2020-08-22

## 2020-08-19 RX ORDER — FAMOTIDINE 10 MG/ML
20 INJECTION INTRAVENOUS EVERY 12 HOURS
Refills: 0 | Status: DISCONTINUED | OUTPATIENT
Start: 2020-08-19 | End: 2020-08-19

## 2020-08-19 RX ORDER — ACETAMINOPHEN 500 MG
650 TABLET ORAL EVERY 6 HOURS
Refills: 0 | Status: DISCONTINUED | OUTPATIENT
Start: 2020-08-19 | End: 2020-08-20

## 2020-08-19 RX ORDER — ONDANSETRON 8 MG/1
4 TABLET, FILM COATED ORAL EVERY 6 HOURS
Refills: 0 | Status: DISCONTINUED | OUTPATIENT
Start: 2020-08-19 | End: 2020-08-22

## 2020-08-19 RX ADMIN — Medication 650 MILLIGRAM(S): at 05:42

## 2020-08-19 RX ADMIN — TRAMADOL HYDROCHLORIDE 25 MILLIGRAM(S): 50 TABLET ORAL at 23:12

## 2020-08-19 RX ADMIN — Medication 650 MILLIGRAM(S): at 00:45

## 2020-08-19 RX ADMIN — Medication 650 MILLIGRAM(S): at 06:42

## 2020-08-19 RX ADMIN — SODIUM CHLORIDE 80 MILLILITER(S): 9 INJECTION INTRAMUSCULAR; INTRAVENOUS; SUBCUTANEOUS at 00:00

## 2020-08-19 RX ADMIN — Medication 650 MILLIGRAM(S): at 23:13

## 2020-08-19 NOTE — CONSULT NOTE ADULT - ASSESSMENT
85M with Alzheimer's dementia, Prostate CA in past, HTN, hyperlipidemia, Squamous Cell Carcinoma, CKD 5 (base line Cr 3.44 as of Feb 20'), brought in by aide secondary to fall at home w continued L hip pain found to have left hip fracture requiring left hip hemiarthroplasty, medicine service consulted for preoperative evaluation. 85M with Alzheimer's dementia, Prostate CA in past, HTN, hyperlipidemia, Squamous Cell Carcinoma, CKD 5 (base line Cr 3.44 as of Feb 20'), brought in by aide secondary to fall at home w continued L hip pain found to have left hip fracture requiring left hip hemiarthroplasty, medicine service consulted for preoperative evaluation.     Thank you for the consultation

## 2020-08-19 NOTE — CONSULT NOTE ADULT - SUBJECTIVE AND OBJECTIVE BOX
Patient is a 86y old  Male who presents with a chief complaint of L Hip fx (19 Aug 2020 10:06)      HPI:  85M with Alzheimer's dementia, Prostate CA in past, HTN, hyperlipidemia, Squamous Cell Carcinoma, CRI (baseline Cr 3.04, based off of previous charts), brought in by aide secondary to fall at home w continued L hip pain. Pt demented at baseline unable to obtain history. Spoke with son, pt follows with Dr. Ashraf. Chart review shows CKD5 and dialysis conversation was initiated. Creatinine 4.94 at last visit in July.  Anaemia of chronic disease, Hgb 10.5, BUN/creat- 65/4.94, GFR 10  Home meds: Norvasc 2.5, atorvastatin 10, lasix 20, memantine 7mg  Pt s/p fall found to have Left hip fracture    PAST MEDICAL & SURGICAL HISTORY:  Chronic kidney disease, unspecified CKD stage  Cancer of skin, squamous cell  Prostate CA  Alzheimer disease  Hyperlipidemia  HTN (hypertension)  H/O prostate cancer      Allergies:  penicillins (Rash)      Home Medications: Norvasc 2.5, atorvastatin 10, lasix 20, memantine 7mg    Hospital Medications:   MEDICATIONS  (STANDING):  acetaminophen   Tablet .. 650 milliGRAM(s) Oral every 6 hours  povidone iodine 5% Nasal Swab 1 Application(s) Both Nostrils once  sodium chloride 0.9%. 1000 milliLiter(s) (80 mL/Hr) IV Continuous <Continuous>      SOCIAL HISTORY:  Denies ETOh, Smoking,     Family History:  FAMILY HISTORY:    ROS: Unable to obtain d/t patient's dementia    VITALS:  T(F): 98.3 (20 @ 08:10), Max: 98.3 (20 @ 21:00)  HR: 80 (20 @ 08:10)  BP: 158/83 (20 @ 08:10)  RR: 18 (20 @ 08:10)  SpO2: 98% (20 @ 08:10)  Wt(kg): --     @ 07:01  -   @ 07:00  --------------------------------------------------------  IN: 0 mL / OUT: 400 mL / NET: -400 mL     @ 07:01  -   @ 14:16  --------------------------------------------------------  IN: 0 mL / OUT: 550 mL / NET: -550 mL        CAPILLARY BLOOD GLUCOSE          PHYSICAL EXAM:  GENERAL: Alert, awake, speaking inappropriate answers to questions asked  CHEST/LUNG: Bilateral clear breath sounds, no rales, no crepitations, no wheezing  HEART: Regular rate and rhythm, no murmur  ABDOMEN: Soft, nontender, non distended, no guarding, no rigidity, no rebound tenderness  : No suprapubic tenderness.  EXTREMITIES: No pedal oedema, LLE contracted, immobile    LABS:      142  |  108  |  53<H>  ----------------------------<  127<H>  4.0   |  19<L>  |  4.97<H>    Ca    9.5      19 Aug 2020 06:48    TPro  7.4  /  Alb  4.3  /  TBili  0.5  /  DBili      /  AST  14  /  ALT  12  /  AlkPhos  124<H>      Creatinine Trend: 4.97 <--, 5.18 <--                        9.9    8.27  )-----------( 146      ( 19 Aug 2020 06:48 )             30.2       Urine Studies:  Urinalysis Basic - ( 18 Aug 2020 21:26 )    Color: Yellow / Appearance: Clear / S.020 / pH:   Gluc:  / Ketone: NEGATIVE  / Bili: Negative / Urobili: 0.2 E.U./dL   Blood:  / Protein: 30 mg/dL / Nitrite: NEGATIVE   Leuk Esterase: NEGATIVE / RBC: < 5 /HPF / WBC < 5 /HPF   Sq Epi:  / Non Sq Epi: 0-5 /HPF / Bacteria: Present /HPF            20 @ 07:01  -  20 @ 07:00  --------------------------------------------------------  IN: 0 mL / OUT: 400 mL / NET: -400 mL    20 @ 07:01  -  20 @ 14:16  --------------------------------------------------------  IN: 0 mL / OUT: 550 mL / NET: -550 mL        RADIOLOGY & ADDITIONAL STUDIES:    EKG findings reviewed.    Imaging Personally Reviewed:  [x] YES  [ ] NO    Consultant(s) and primary physician Notes Reviewed:  [x] YES  [ ] NO    Care Discussed with Primary team/ Consultants/Other Providers [x] YES  [ ] NO    Assessment/Plan:   CKD5 patient presenting with Left hip fracture scheduled for       Thank you for the opportunity to participate in the care of your patient. The nephrology service remains available to assist with any questions or concerns. Please feel free to reach us by paging the on-call nephrology fellow for urgent issues or as below.     Isatu Palm M.D.   PGY-4  Pager: [phone] Patient is a 86y old  Male who presents with a chief complaint of L Hip fx (19 Aug 2020 10:06)      HPI:  85M with Alzheimer's dementia, Prostate CA in past, HTN, hyperlipidemia, Squamous Cell Carcinoma, CRI (baseline Cr 3.04, based off of previous charts), brought in by aide secondary to fall at home w continued L hip pain. Pt demented at baseline unable to obtain history. Spoke with son, pt follows with Dr. Ashraf. Chart review shows CKD5 and dialysis conversation was initiated. Creatinine 4.94 at last visit in July.  Anaemia of chronic disease, Hgb 10.5, BUN/creat- 65/4.94, GFR 10  Home meds: Norvasc 2.5, atorvastatin 10, lasix 20, memantine 7mg  Pt s/p fall found to have Left hip fracture    PAST MEDICAL & SURGICAL HISTORY:  Chronic kidney disease, unspecified CKD stage  Cancer of skin, squamous cell  Prostate CA  Alzheimer disease  Hyperlipidemia  HTN (hypertension)  H/O prostate cancer      Allergies:  penicillins (Rash)      Home Medications: Norvasc 2.5, atorvastatin 10, lasix 20, memantine 7mg    Hospital Medications:   MEDICATIONS  (STANDING):  acetaminophen   Tablet .. 650 milliGRAM(s) Oral every 6 hours  povidone iodine 5% Nasal Swab 1 Application(s) Both Nostrils once  sodium chloride 0.9%. 1000 milliLiter(s) (80 mL/Hr) IV Continuous <Continuous>      SOCIAL HISTORY:  Denies ETOh, Smoking,     Family History:  FAMILY HISTORY:    ROS: Unable to obtain d/t patient's dementia    VITALS:  T(F): 98.3 (20 @ 08:10), Max: 98.3 (20 @ 21:00)  HR: 80 (20 @ 08:10)  BP: 158/83 (20 @ 08:10)  RR: 18 (20 @ 08:10)  SpO2: 98% (20 @ 08:10)  Wt(kg): --     @ 07:01  -   @ 07:00  --------------------------------------------------------  IN: 0 mL / OUT: 400 mL / NET: -400 mL     @ 07:01  -   @ 14:16  --------------------------------------------------------  IN: 0 mL / OUT: 550 mL / NET: -550 mL        CAPILLARY BLOOD GLUCOSE          PHYSICAL EXAM:  GENERAL: Alert, awake, speaking inappropriate answers to questions asked  CHEST/LUNG: Bilateral clear breath sounds, no rales, no crepitations, no wheezing  HEART: Regular rate and rhythm, no murmur  ABDOMEN: Soft, nontender, non distended, no guarding, no rigidity, no rebound tenderness  : No suprapubic tenderness.  EXTREMITIES: No pedal oedema, LLE contracted, immobile    LABS:      142  |  108  |  53<H>  ----------------------------<  127<H>  4.0   |  19<L>  |  4.97<H>    Ca    9.5      19 Aug 2020 06:48    TPro  7.4  /  Alb  4.3  /  TBili  0.5  /  DBili      /  AST  14  /  ALT  12  /  AlkPhos  124<H>      Creatinine Trend: 4.97 <--, 5.18 <--                        9.9    8.27  )-----------( 146      ( 19 Aug 2020 06:48 )             30.2       Urine Studies:  Urinalysis Basic - ( 18 Aug 2020 21:26 )    Color: Yellow / Appearance: Clear / S.020 / pH:   Gluc:  / Ketone: NEGATIVE  / Bili: Negative / Urobili: 0.2 E.U./dL   Blood:  / Protein: 30 mg/dL / Nitrite: NEGATIVE   Leuk Esterase: NEGATIVE / RBC: < 5 /HPF / WBC < 5 /HPF   Sq Epi:  / Non Sq Epi: 0-5 /HPF / Bacteria: Present /HPF            20 @ 07:01  -  20 @ 07:00  --------------------------------------------------------  IN: 0 mL / OUT: 400 mL / NET: -400 mL    20 @ 07:01  -  20 @ 14:16  --------------------------------------------------------  IN: 0 mL / OUT: 550 mL / NET: -550 mL        RADIOLOGY & ADDITIONAL STUDIES:    EKG findings reviewed.    Imaging Personally Reviewed:  [x] YES  [ ] NO    Consultant(s) and primary physician Notes Reviewed:  [x] YES  [ ] NO    Care Discussed with Primary team/ Consultants/Other Providers [x] YES  [ ] NO    Assessment/Plan:   CKD5 patient presenting with Left hip fracture scheduled for repair today    BUN/creat 53/4.97- at baseline  Euvolaemic, electrolytes acceptable  Hgb- 10.5, obtain iron studies  Metabolic acidosis, can supplement post op with Nabicarb 650mg q8    No contraindication to the procedure, we will continue to follow him post op.    Thank you for the opportunity to participate in the care of your patient. The nephrology service remains available to assist with any questions or concerns. Please feel free to reach us by paging the on-call nephrology fellow for urgent issues or as below.     Isatu Palm M.D.   PGY-4  Pager: [phone]

## 2020-08-19 NOTE — BRIEF OPERATIVE NOTE - NSICDXBRIEFPREOP_GEN_ALL_CORE_FT
PRE-OP DIAGNOSIS:  Closed fracture of left hip, initial encounter 19-Aug-2020 18:21:06 Closed fracture of left hip, initial encounter Nicolette Santa

## 2020-08-19 NOTE — BRIEF OPERATIVE NOTE - NSICDXBRIEFPOSTOP_GEN_ALL_CORE_FT
POST-OP DIAGNOSIS:  Closed fracture of left hip, initial encounter 19-Aug-2020 18:21:36 Closed fracture of left hip, initial encounter Nicolette Santa

## 2020-08-19 NOTE — CHART NOTE - NSCHARTNOTEFT_GEN_A_CORE
Pt seen this morning. Oriented to self only. Able to endorse pain in L hip and groin area but largely tangential speech and requires frequent redirection. Denies fever, chest pain, dyspnea, N/V/abd pain. No numbness. Exam shows thin male in NAD on RA, MMM, RRR, no BLE edema, nml effort, CTAB, Soft, NABS, non-tender, alert, moving all extremities. a/o to self. L leg w hip/knee flexion and ER. sensation intact b/l. EOMI, CN II-XI grossly intact.     86M w h/o Alzheimer's dementia (A/O x1-2), HTN, CKD, Anemia, prostate CA, p/w fall found to have L femoral neck fx    #Pre-op evaluation  #L femoral neck fx - mgmt per orthopedics  #CKDV - Cr 4.9 (prior baseline 3.4). Nephrology following. Appears euvolemic  #HTN - Above target. Possibly complicated by pain. Holding amlodipine and lasix.   #Alzheimer's dementia - on memantine 7mg ER. prior records state pt had N/V w donepezil  #Normocytic anemia - does not appear symptomatic.     Plan  From medical standpoint, patient optimized for OR  IF BP sustain > 160 would start amlodipine 2.5mg PO Daily  Restart memantine 7mg ER  Iron panel and ferritin added on    DISPO: TBD pending OR and PT eval post-op    Discussed w Ortho

## 2020-08-19 NOTE — CONSULT NOTE ADULT - PROBLEM SELECTOR RECOMMENDATION 9
Hx of HLD and HTN, pt reports no SOB/Chest p at rest or on exertion, no hx of cardiac w/u, last echo Feb 20' w/ EF 55-60% and normal LV fx, pt can exert ??  Recommendation:  - Hx of HLD and HTN, pt reports no SOB/Chest p at rest or on exertion, no hx of cardiac w/u, last echo Feb 20' w/ EF 55-60% and normal LV fx, pt cannot describe functional status and METs d/t dementia  Recommendation:  -RCRI score of 1, Class 2 Risk for MACE  -Pt is considered low risk for a low risk procedure ADDENDUM: pending ortho repair

## 2020-08-19 NOTE — PROGRESS NOTE ADULT - SUBJECTIVE AND OBJECTIVE BOX
Ortho Note    Pt comfortable without complaints, pain controlled  not oriented   Denies CP, SOB, N/V, numbness/tingling     Vital Signs Last 24 Hrs  T(C): 36.8 (08-19-20 @ 08:10), Max: 36.8 (08-19-20 @ 08:10)  T(F): 98.3 (08-19-20 @ 08:10), Max: 98.3 (08-19-20 @ 08:10)  HR: 80 (08-19-20 @ 08:10) (80 - 85)  BP: 158/83 (08-19-20 @ 08:10) (158/83 - 169/77)  BP(mean): --  RR: 18 (08-19-20 @ 08:10) (17 - 18)  SpO2: 98% (08-19-20 @ 08:10) (96% - 98%)      General: Pt Alert and oriented, NAD  ttp at hip  Pulses: 2+ DP pulse   Sensation: SILT over distal limb and symmetric to contralateral side  Motor: 5/5 EHL/FHL/TA/GS                          9.9    8.27  )-----------( 146      ( 19 Aug 2020 06:48 )             30.2   19 Aug 2020 06:48    142    |  108    |  53     ----------------------------<  127    4.0     |  19     |  4.97     Ca    9.5        19 Aug 2020 06:48    TPro  7.4    /  Alb  4.3    /  TBili  0.5    /  DBili  x      /  AST  14     /  ALT  12     /  AlkPhos  124    19 Aug 2020 06:48      A/P: 86yMale fall W L hip fx   - Stable  - Pain Control  - DVT ppx: SCDs  - PT, WBS: NWB LLE  - NPO/IVf for OR    Ortho Pager [phone]

## 2020-08-19 NOTE — CONSULT NOTE ADULT - ATTENDING COMMENTS
have had long conversation with pt's son.-- pt has chronic progressive crf, w/o c-v issues and with no hx of lower tract symptoms.  agree with findings and plan.  have discussed the ability to proceed with necessary surgery even with renal disease with pt's family.

## 2020-08-19 NOTE — CHART NOTE - NSCHARTNOTEFT_GEN_A_CORE
Admitting Diagnosis:   Patient is a 86y old  Male who presents with a chief complaint of L Hip fx (19 Aug 2020 14:16)    Consult: Yes [   ]  No [ X ]    Reason for Initial Nutrition Assessment: LOS    PAST MEDICAL & SURGICAL HISTORY:  Chronic kidney disease, unspecified CKD stage  Cancer of skin, squamous cell  Prostate CA  Alzheimer disease  Hyperlipidemia  HTN (hypertension)  H/O prostate cancer    Current Nutrition Order: NPO    PO Intake: Good (%) [   ]  Fair (50-75%) [   ] Poor (<25%) [   ]-N/A    GI Issues: no N/V per RN, no BM during admission per RN    Pain: unable to assess as pt off floor at this time    Skin Integrity: Ilya score 16    Labs:   08-19    142  |  108  |  53<H>  ----------------------------<  127<H>  4.0   |  19<L>  |  4.97<H>    Ca    9.5      19 Aug 2020 06:48    TPro  7.4  /  Alb  4.3  /  TBili  0.5  /  DBili  x   /  AST  14  /  ALT  12  /  AlkPhos  124<H>  08-19    Nutritionally Pertinent Lab Values:  8/19: BUN 53, Cr 4.97, Glu 127, Alk phos 124, GFR 10    Medications:  MEDICATIONS  (STANDING):  acetaminophen   Tablet .. 650 milliGRAM(s) Oral every 6 hours  povidone iodine 5% Nasal Swab 1 Application(s) Both Nostrils once  sodium chloride 0.9%. 1000 milliLiter(s) (80 mL/Hr) IV Continuous <Continuous>    MEDICATIONS  (PRN):  traMADol 25 milliGRAM(s) Oral every 6 hours PRN Moderate Pain (4 - 6)    Admitted Anthropometrics:  Ht (2/2020 previous admission): 162.56cm, Wt (2/2020 previous admission): 53.6kg, IBW: 59kg+/-10%, %IBW: 90%, BMI: 20.3    Nutrition Focused Physical Exam: Completed [   ]  Unable to complete [ X  ]    Estimated energy needs:  IBW (59kg) used to calculate energy needs as current weight unknown/clinical judgement.  Needs adjusted for age, post-op, renal function.    6719-5994 kcal (25-30 kcal/kg ABW)  47-59gm protein (0.8-1.0gm protein/kg ABW)  Fluid needs per team    >>adjust kcal/protein ranges based on more information regarding diet/wt hx as well as renal function    Subjective: 85M with Alzheimer's dementia, Prostate CA in past, HTN, hyperlipidemia, Squamous Cell Carcinoma, CRI (baseline Cr 3.04, based off of previous charts), brought in by aide secondary to fall at home w continued L hip pain. Noted to have L hip fracture, pt in OR at this time for repair. Nephrology following pt for elevated BUN/Cr. Noted pt A&Ox1 on admission. Unable to obtain information regarding diet/wt hx PTA 2/2 pt's medical status at this time. NKFA per chart. Please see below for full nutritional recommendations- d/w team. RD to monitor and f/u per protocol.    Nutrition Diagnosis:  Inadequate energy intake RT inability to meet EER on NPO status AEB pt meeting 0%EER at this time    Goal: Diet to initiate/advance within 24-48h. Pt to meet >/=75%EER PO with good tolerance when diet advanced.    Recommendations:  1. Consider SLP eval prior to diet initiation 2/2 advanced age and pt A&Ox1 with Alzheimer's  2. Monitor labs (BMP, lytes); replete lytes prn  3. Trend bi-weekly weights    Education: N/A as pt off floor in OR    Risk Level: High [ X ] Moderate [   ] Low [   ]

## 2020-08-19 NOTE — CONSULT NOTE ADULT - PROBLEM SELECTOR RECOMMENDATION 6
Elevated bp since admission of 160s/60s, no hx of antihypertensives  Recommendation:  -consider 5mg Norvasc 24h if persistently hypertensive after procedure with adequate pain control

## 2020-08-19 NOTE — CONSULT NOTE ADULT - PROBLEM SELECTOR PROBLEM 5
Alzheimer disease Alzheimer's dementia without behavioral disturbance, unspecified timing of dementia onset

## 2020-08-19 NOTE — CONSULT NOTE ADULT - PROBLEM SELECTOR RECOMMENDATION 3
Elevated bp since admission of 160s/60s, no hx of antihypertensives  Recommendation:  -consider 5mg Norvasc 24h after procedure with adequate pain control Elevated bp since admission of 160s/60s, no hx of antihypertensives  Recommendation:  -consider 5mg Norvasc 24h if persistently hypertensive after procedure with adequate pain control Hx of CKD5 with progression of Cr from 3.44 to 5.18 w/no evidence electrolyte derangements, pt was lost to followup with nephrologist years, makes urine during hospital admission via hopper  Recommendation  -c/w IVF as per Renal  -f/u Renal recs    ADDENDUM: renal function improvement on IVFs o/n, likely SHARON on CKD, followup renal recs

## 2020-08-19 NOTE — CONSULT NOTE ADULT - ATTENDING COMMENTS
patient seen and examined overnight     reviewed pertinent data , consult note    PE as above , pt in NAD, appears euvolemic ; oriented to self only, tangential. +hopper in place     1.  preop for left hip fx: pt is medically optimized for procedure; followup renal recs, on IVFs for SHARON on CKD o/n , monitor UO, monitor renal function     plan as above, with noted addenda patient seen and examined overnight     reviewed pertinent data , consult note    PE as above , pt in NAD, appears euvolemic ; oriented to self only, tangential. +hopper in place ; deferred motor exam of lower ext 2/2 fx; no hip pain b/l on palpation     1.  preop for left hip fx: pt is medically optimized for procedure; followup renal recs, on IVFs for SHARON on CKD o/n , monitor UO, monitor renal function     plan as above, with noted addenda

## 2020-08-19 NOTE — CONSULT NOTE ADULT - SUBJECTIVE AND OBJECTIVE BOX
**Incomplete Note**    85M with Alzheimer's dementia, Prostate CA in past, HTN, hyperlipidemia, Squamous Cell Carcinoma, CKD 5 (base line Cr 3.44 as of Feb 20'), brought in by aide secondary to fall at home w continued L hip pain. Pt demented at baseline unable to obtain history. Aide told ED providers that pt had a slip and fall onto his left side. denies any head trauma. No open wounds noted.          PAST MEDICAL/SURGICAL HISTORY  PAST MEDICAL & SURGICAL HISTORY:  Chronic kidney disease, unspecified CKD stage  Cancer of skin, squamous cell  Prostate CA  Alzheimer disease  Hyperlipidemia  HTN (hypertension)  H/O prostate cancer      REVIEW OF SYSTEMS:  CONSTITUTIONAL: No fever, weight loss, or fatigue  EYES: No eye pain, visual disturbances, or discharge  ENMT:  No difficulty hearing, tinnitus, vertigo; No sinus or throat pain  NECK: No pain or stiffness  BREASTS: No pain, masses, or nipple discharge  RESPIRATORY: No cough, wheezing, chills or hemoptysis; No shortness of breath  CARDIOVASCULAR: No chest pain, palpitations, dizziness, or leg swelling  GASTROINTESTINAL: No abdominal or epigastric pain. No nausea, vomiting, or hematemesis; No diarrhea or constipation. No melena or hematochezia.  GENITOURINARY: No dysuria, frequency, hematuria, or incontinence  NEUROLOGICAL: No headaches, memory loss, loss of strength, numbness, or tremors  SKIN: No itching, burning, rashes, or lesions   LYMPH NODES: No enlarged glands  ENDOCRINE: No heat or cold intolerance; No hair loss  MUSCULOSKELETAL: No joint pain or swelling; No muscle, back, or extremity pain  PSYCHIATRIC: No depression, anxiety, mood swings, or difficulty sleeping  HEME/LYMPH: No easy bruising, or bleeding gums  ALLERY AND IMMUNOLOGIC: No hives or eczema    T(C): 36.7 (08-18-20 @ 23:44), Max: 36.8 (08-18-20 @ 21:00)  HR: 96 (08-18-20 @ 23:44) (88 - 96)  BP: 162/69 (08-18-20 @ 23:44) (139/78 - 162/69)  RR: 16 (08-18-20 @ 23:44) (16 - 18)  SpO2: 97% (08-18-20 @ 23:44) (97% - 99%)  Wt(kg): --Vital Signs Last 24 Hrs  T(C): 36.7 (18 Aug 2020 23:44), Max: 36.8 (18 Aug 2020 21:00)  T(F): 98.1 (18 Aug 2020 23:44), Max: 98.3 (18 Aug 2020 21:00)  HR: 96 (18 Aug 2020 23:44) (88 - 96)  BP: 162/69 (18 Aug 2020 23:44) (139/78 - 162/69)  BP(mean): --  RR: 16 (18 Aug 2020 23:44) (16 - 18)  SpO2: 97% (18 Aug 2020 23:44) (97% - 99%)    PHYSICAL EXAM:  GENERAL: NAD, well-groomed, well-developed  HEAD:  Atraumatic, Normocephalic  EYES: EOMI, PERRLA, conjunctiva and sclera clear  ENMT: No tonsillar erythema, exudates, or enlargement; Moist mucous membranes, Good dentition, No lesions  NECK: Supple, No JVD, Normal thyroid  NERVOUS SYSTEM:  Alert & Oriented X3, Good concentration; Motor Strength 5/5 B/L upper and lower extremities; DTRs 2+ intact and symmetric  CHEST/LUNG: Clear to percussion bilaterally; No rales, rhonchi, wheezing, or rubs  HEART: Regular rate and rhythm; No murmurs, rubs, or gallops  ABDOMEN: Soft, Nontender, Nondistended; Bowel sounds present  EXTREMITIES:  2+ Peripheral Pulses, No clubbing, cyanosis, or edema  LYMPH: No lymphadenopathy noted  SKIN: No rashes or lesions    Consultant(s) Notes Reviewed:  [x ] YES  [ ] NO  Care Discussed with Consultants/Other Providers [ x] YES  [ ] NO    LABS:  CBC   08-18-20 @ 19:07  Hematcorit 32.5  Hemoglobin 10.7  Mean Cell Hemoglobin 31.9  Platelet Count-Automated 185  RBC Count 3.35  Red Cell Distrib Width 12.8  Wbc Count 13.83      BMP  08-18-20 @ 19:07  Anion Gap. Serum 17  Blood Urea Nitrogen,Serm 55  Calcium, Total Serum 10.0  Carbon Dioxide, Serum 19  Chloride, Serum 106  Creatinine, Serum 5.18  eGFR in  11  eGFR in Non Afican American 9  Gloucose, serum 171  Potassium, Serum 4.6  Sodium, Serum 142                  CMP  08-18-20 @ 19:07  Cherelle Aminotransferase(ALT/SGPT)12  Albumin, Serum 4.8  Alkaline Phosphatase, Serum 146  Anion Gap, Serum 17  Aspartate Aminotransferase (AST/SGOT)15  Bilirubin Total, Serum 0.4  Blood Urea Nitrogen, Serum 55  Calcium,Total Serum 10.0  Carbon Dioxide, Serum 19  Chloride, Serum 106  Creatinine, Serum 5.18  eGFR if  11  eGFR if Non African American 9  Glucose, Serum 171  Potassium, Serum 4.6  Protein Total, Serum 7.8  Sodium, Serum 142                          PT/INR  PT/INR  08-18-20 @ 19:07  INR 0.99  Prothrombin Time Comment --  Prothrobin Time, [id number]      Amylase/Lipase            RADIOLOGY & ADDITIONAL TESTS:    Imaging Personally Reviewed:  [ ] YES  [ ] NO 85M with Alzheimer's dementia (A&OX1), Prostate CA in past, HTN, hyperlipidemia, Squamous Cell Carcinoma, CKD 5 (base line Cr 3.44 as of Feb 20'), brought in by aide secondary to fall at home w continued L hip pain. Pt demented at baseline unable to obtain history. Aide told ED providers that pt had a slip and fall onto his left side. denies any head trauma. No open wounds noted.          PAST MEDICAL/SURGICAL HISTORY  PAST MEDICAL & SURGICAL HISTORY:  Chronic kidney disease, unspecified CKD stage  Cancer of skin, squamous cell  Prostate CA  Alzheimer disease  Hyperlipidemia  HTN (hypertension)  H/O prostate cancer      REVIEW OF SYSTEMS:  CONSTITUTIONAL: No fever, weight loss, or fatigue  EYES: No eye pain, visual disturbances, or discharge  ENMT:  No difficulty hearing, tinnitus, vertigo; No sinus or throat pain  NECK: No pain or stiffness  BREASTS: No pain, masses, or nipple discharge  RESPIRATORY: No cough, wheezing, chills or hemoptysis; No shortness of breath  CARDIOVASCULAR: No chest pain, palpitations, dizziness, or leg swelling  GASTROINTESTINAL: No abdominal or epigastric pain. No nausea, vomiting, or hematemesis; No diarrhea or constipation. No melena or hematochezia.  GENITOURINARY: No dysuria, frequency, hematuria, or incontinence  NEUROLOGICAL: No headaches, memory loss, loss of strength, numbness, or tremors  SKIN: No itching, burning, rashes, or lesions   LYMPH NODES: No enlarged glands  ENDOCRINE: No heat or cold intolerance; No hair loss  MUSCULOSKELETAL: No joint pain or swelling; No muscle, back, or extremity pain  PSYCHIATRIC: No depression, anxiety, mood swings, or difficulty sleeping  HEME/LYMPH: No easy bruising, or bleeding gums  ALLERY AND IMMUNOLOGIC: No hives or eczema    T(C): 36.7 (08-18-20 @ 23:44), Max: 36.8 (08-18-20 @ 21:00)  HR: 96 (08-18-20 @ 23:44) (88 - 96)  BP: 162/69 (08-18-20 @ 23:44) (139/78 - 162/69)  RR: 16 (08-18-20 @ 23:44) (16 - 18)  SpO2: 97% (08-18-20 @ 23:44) (97% - 99%)  Wt(kg): --Vital Signs Last 24 Hrs  T(C): 36.7 (18 Aug 2020 23:44), Max: 36.8 (18 Aug 2020 21:00)  T(F): 98.1 (18 Aug 2020 23:44), Max: 98.3 (18 Aug 2020 21:00)  HR: 96 (18 Aug 2020 23:44) (88 - 96)  BP: 162/69 (18 Aug 2020 23:44) (139/78 - 162/69)  BP(mean): --  RR: 16 (18 Aug 2020 23:44) (16 - 18)  SpO2: 97% (18 Aug 2020 23:44) (97% - 99%)    PHYSICAL EXAM:  GENERAL: Elderly gentleman, NAD, well-groomed, well-developed  HEAD:  Atraumatic, Normocephalic  EYES: EOMI, PERRLA, conjunctiva and sclera clear  ENMT: No tonsillar erythema, exudates, or enlargement; Moist mucous membranes, Good dentition, No lesions  NECK: Supple, No JVD, Normal thyroid  NERVOUS SYSTEM:  Alert & Oriented X1 (self), Good concentration; Motor Strength 5/5 B/L upper and lower extremities; DTRs 2+ intact and symmetric  CHEST/LUNG: Clear to percussion bilaterally; No rales, rhonchi, wheezing, or rubs  HEART: Regular rate and rhythm; No murmurs, rubs, or gallops  ABDOMEN: Soft, Nontender, Nondistended; Bowel sounds present  EXTREMITIES:  2+ Peripheral Pulses, No clubbing, cyanosis, or edema  LYMPH: No lymphadenopathy noted  SKIN: No rashes or lesions    Consultant(s) Notes Reviewed:  [x ] YES  [ ] NO  Care Discussed with Consultants/Other Providers [ x] YES  [ ] NO    LABS:  CBC   08-18-20 @ 19:07  Hematcorit 32.5  Hemoglobin 10.7  Mean Cell Hemoglobin 31.9  Platelet Count-Automated 185  RBC Count 3.35  Red Cell Distrib Width 12.8  Wbc Count 13.83      BMP  08-18-20 @ 19:07  Anion Gap. Serum 17  Blood Urea Nitrogen,Serm 55  Calcium, Total Serum 10.0  Carbon Dioxide, Serum 19  Chloride, Serum 106  Creatinine, Serum 5.18  eGFR in  11  eGFR in Non Afican American 9  Gloucose, serum 171  Potassium, Serum 4.6  Sodium, Serum 142                  CMP  08-18-20 @ 19:07  Cherelle Aminotransferase(ALT/SGPT)12  Albumin, Serum 4.8  Alkaline Phosphatase, Serum 146  Anion Gap, Serum 17  Aspartate Aminotransferase (AST/SGOT)15  Bilirubin Total, Serum 0.4  Blood Urea Nitrogen, Serum 55  Calcium,Total Serum 10.0  Carbon Dioxide, Serum 19  Chloride, Serum 106  Creatinine, Serum 5.18  eGFR if  11  eGFR if Non African American 9  Glucose, Serum 171  Potassium, Serum 4.6  Protein Total, Serum 7.8  Sodium, Serum 142                          PT/INR  PT/INR  08-18-20 @ 19:07  INR 0.99  Prothrombin Time Comment --  Prothrobin Time, [id number]      Amylase/Lipase            RADIOLOGY & ADDITIONAL TESTS:    Imaging Personally Reviewed:  [ ] YES  [ ] NO 85M with Alzheimer's dementia (A&OX1), Prostate CA in past, HTN, hyperlipidemia, Squamous Cell Carcinoma, CKD 5 (base line Cr 3.44 as of Feb 20'), brought in by aide secondary to fall at home w continued L hip pain. Pt demented at baseline unable to obtain history. Aide told ED providers that pt had a slip and fall onto his left side. denies any head trauma. No open wounds noted.          PAST MEDICAL/SURGICAL HISTORY  PAST MEDICAL & SURGICAL HISTORY:  Chronic kidney disease, unspecified CKD stage  Cancer of skin, squamous cell  Prostate CA  Alzheimer disease  Hyperlipidemia  HTN (hypertension)  H/O prostate cancer    SHX: unable to obtain  FHX: unable to obtain       REVIEW OF SYSTEMS:  CONSTITUTIONAL: No fever, weight loss, or fatigue  EYES: No eye pain, visual disturbances, or discharge  ENMT:  No difficulty hearing, tinnitus, vertigo; No sinus or throat pain  NECK: No pain or stiffness  BREASTS: No pain, masses, or nipple discharge  RESPIRATORY: No cough, wheezing, chills or hemoptysis; No shortness of breath  CARDIOVASCULAR: No chest pain, palpitations, dizziness, or leg swelling  GASTROINTESTINAL: No abdominal or epigastric pain. No nausea, vomiting, or hematemesis; No diarrhea or constipation. No melena or hematochezia.  GENITOURINARY: No dysuria, frequency, hematuria, or incontinence  NEUROLOGICAL: No headaches, memory loss, loss of strength, numbness, or tremors  SKIN: No itching, burning, rashes, or lesions   LYMPH NODES: No enlarged glands  ENDOCRINE: No heat or cold intolerance; No hair loss  MUSCULOSKELETAL: No joint pain or swelling; No muscle, back, or extremity pain  PSYCHIATRIC: No depression, anxiety, mood swings, or difficulty sleeping  HEME/LYMPH: No easy bruising, or bleeding gums  ALLERY AND IMMUNOLOGIC: No hives or eczema    T(C): 36.7 (08-18-20 @ 23:44), Max: 36.8 (08-18-20 @ 21:00)  HR: 96 (08-18-20 @ 23:44) (88 - 96)  BP: 162/69 (08-18-20 @ 23:44) (139/78 - 162/69)  RR: 16 (08-18-20 @ 23:44) (16 - 18)  SpO2: 97% (08-18-20 @ 23:44) (97% - 99%)  Wt(kg): --Vital Signs Last 24 Hrs  T(C): 36.7 (18 Aug 2020 23:44), Max: 36.8 (18 Aug 2020 21:00)  T(F): 98.1 (18 Aug 2020 23:44), Max: 98.3 (18 Aug 2020 21:00)  HR: 96 (18 Aug 2020 23:44) (88 - 96)  BP: 162/69 (18 Aug 2020 23:44) (139/78 - 162/69)  BP(mean): --  RR: 16 (18 Aug 2020 23:44) (16 - 18)  SpO2: 97% (18 Aug 2020 23:44) (97% - 99%)    PHYSICAL EXAM:  GENERAL: Elderly gentleman, NAD, well-groomed, well-developed  HEAD:  Atraumatic, Normocephalic  EYES: EOMI, PERRLA, conjunctiva and sclera clear  ENMT: No tonsillar erythema, exudates, or enlargement; Moist mucous membranes, Good dentition, No lesions  NECK: Supple, No JVD, Normal thyroid  NERVOUS SYSTEM:  Alert & Oriented X1 (self), Good concentration; Motor Strength 5/5 B/L upper and lower extremities; DTRs 2+ intact and symmetric  CHEST/LUNG: Clear to percussion bilaterally; No rales, rhonchi, wheezing, or rubs  HEART: Regular rate and rhythm; No murmurs, rubs, or gallops  ABDOMEN: Soft, Nontender, Nondistended; Bowel sounds present  EXTREMITIES:  2+ Peripheral Pulses, No clubbing, cyanosis, or edema  LYMPH: No lymphadenopathy noted  SKIN: No rashes or lesions    Consultant(s) Notes Reviewed:  [x ] YES  [ ] NO  Care Discussed with Consultants/Other Providers [ x] YES  [ ] NO    LABS:  CBC   08-18-20 @ 19:07  Hematcorit 32.5  Hemoglobin 10.7  Mean Cell Hemoglobin 31.9  Platelet Count-Automated 185  RBC Count 3.35  Red Cell Distrib Width 12.8  Wbc Count 13.83      BMP  08-18-20 @ 19:07  Anion Gap. Serum 17  Blood Urea Nitrogen,Serm 55  Calcium, Total Serum 10.0  Carbon Dioxide, Serum 19  Chloride, Serum 106  Creatinine, Serum 5.18  eGFR in  11  eGFR in Non Afican American 9  Gloucose, serum 171  Potassium, Serum 4.6  Sodium, Serum 142                  CMP  08-18-20 @ 19:07  Cherelle Aminotransferase(ALT/SGPT)12  Albumin, Serum 4.8  Alkaline Phosphatase, Serum 146  Anion Gap, Serum 17  Aspartate Aminotransferase (AST/SGOT)15  Bilirubin Total, Serum 0.4  Blood Urea Nitrogen, Serum 55  Calcium,Total Serum 10.0  Carbon Dioxide, Serum 19  Chloride, Serum 106  Creatinine, Serum 5.18  eGFR if  11  eGFR if Non African American 9  Glucose, Serum 171  Potassium, Serum 4.6  Protein Total, Serum 7.8  Sodium, Serum 142                          PT/INR  PT/INR  08-18-20 @ 19:07  INR 0.99  Prothrombin Time Comment --  Prothrobin Time, [id number]      Amylase/Lipase            RADIOLOGY & ADDITIONAL TESTS:    Imaging Personally Reviewed:  [ ] YES  [ ] NO

## 2020-08-19 NOTE — CONSULT NOTE ADULT - PROBLEM SELECTOR RECOMMENDATION 2
Hx of CKD5 with progression of Cr from 3.44 to 5.18, pt was lost to followup with nephrologist years, makes urine Hx of CKD5 with progression of Cr from 3.44 to 5.18 w/no evidence electrolyte derangements, pt was lost to followup with nephrologist years, makes urine during hospital admission via hopper  Recommendation  -c/w IVF as per Renal  -f/u Renal recs Hx of HLD and HTN, pt reports no SOB/Chest p at rest or on exertion, no hx of cardiac w/u, last echo Feb 20' w/ EF 55-60% and normal LV fx, pt cannot describe functional status and METs d/t dementia  Recommendation:  -RCRI score of 1, Class 2 Risk for MACE  -Pt is considered low risk for a low risk procedure

## 2020-08-20 LAB
ANION GAP SERPL CALC-SCNC: 15 MMOL/L — SIGNIFICANT CHANGE UP (ref 5–17)
BUN SERPL-MCNC: 57 MG/DL — HIGH (ref 7–23)
CALCIUM SERPL-MCNC: 8.9 MG/DL — SIGNIFICANT CHANGE UP (ref 8.4–10.5)
CHLORIDE SERPL-SCNC: 110 MMOL/L — HIGH (ref 96–108)
CO2 SERPL-SCNC: 17 MMOL/L — LOW (ref 22–31)
CREAT SERPL-MCNC: 4.96 MG/DL — HIGH (ref 0.5–1.3)
GLUCOSE SERPL-MCNC: 163 MG/DL — HIGH (ref 70–99)
HCT VFR BLD CALC: 25.1 % — LOW (ref 39–50)
HGB BLD-MCNC: 8.1 G/DL — LOW (ref 13–17)
MCHC RBC-ENTMCNC: 32 PG — SIGNIFICANT CHANGE UP (ref 27–34)
MCHC RBC-ENTMCNC: 32.3 GM/DL — SIGNIFICANT CHANGE UP (ref 32–36)
MCV RBC AUTO: 99.2 FL — SIGNIFICANT CHANGE UP (ref 80–100)
NRBC # BLD: 0 /100 WBCS — SIGNIFICANT CHANGE UP (ref 0–0)
PLATELET # BLD AUTO: 138 K/UL — LOW (ref 150–400)
POTASSIUM SERPL-MCNC: 4.6 MMOL/L — SIGNIFICANT CHANGE UP (ref 3.5–5.3)
POTASSIUM SERPL-SCNC: 4.6 MMOL/L — SIGNIFICANT CHANGE UP (ref 3.5–5.3)
RBC # BLD: 2.53 M/UL — LOW (ref 4.2–5.8)
RBC # FLD: 13 % — SIGNIFICANT CHANGE UP (ref 10.3–14.5)
SODIUM SERPL-SCNC: 142 MMOL/L — SIGNIFICANT CHANGE UP (ref 135–145)
WBC # BLD: 7.44 K/UL — SIGNIFICANT CHANGE UP (ref 3.8–10.5)
WBC # FLD AUTO: 7.44 K/UL — SIGNIFICANT CHANGE UP (ref 3.8–10.5)

## 2020-08-20 PROCEDURE — 99233 SBSQ HOSP IP/OBS HIGH 50: CPT

## 2020-08-20 RX ORDER — TRAMADOL HYDROCHLORIDE 50 MG/1
50 TABLET ORAL EVERY 6 HOURS
Refills: 0 | Status: DISCONTINUED | OUTPATIENT
Start: 2020-08-20 | End: 2020-08-22

## 2020-08-20 RX ORDER — ACETAMINOPHEN 500 MG
650 TABLET ORAL EVERY 6 HOURS
Refills: 0 | Status: DISCONTINUED | OUTPATIENT
Start: 2020-08-20 | End: 2020-08-22

## 2020-08-20 RX ORDER — SODIUM BICARBONATE 1 MEQ/ML
650 SYRINGE (ML) INTRAVENOUS EVERY 8 HOURS
Refills: 0 | Status: DISCONTINUED | OUTPATIENT
Start: 2020-08-20 | End: 2020-08-22

## 2020-08-20 RX ORDER — TRAMADOL HYDROCHLORIDE 50 MG/1
50 TABLET ORAL EVERY 6 HOURS
Refills: 0 | Status: DISCONTINUED | OUTPATIENT
Start: 2020-08-20 | End: 2020-08-20

## 2020-08-20 RX ADMIN — Medication 650 MILLIGRAM(S): at 17:54

## 2020-08-20 RX ADMIN — Medication 325 MILLIGRAM(S): at 17:54

## 2020-08-20 RX ADMIN — TRAMADOL HYDROCHLORIDE 25 MILLIGRAM(S): 50 TABLET ORAL at 08:00

## 2020-08-20 RX ADMIN — Medication 650 MILLIGRAM(S): at 12:10

## 2020-08-20 RX ADMIN — Medication 650 MILLIGRAM(S): at 05:07

## 2020-08-20 RX ADMIN — Medication 650 MILLIGRAM(S): at 13:10

## 2020-08-20 RX ADMIN — PANTOPRAZOLE SODIUM 40 MILLIGRAM(S): 20 TABLET, DELAYED RELEASE ORAL at 12:09

## 2020-08-20 RX ADMIN — Medication 650 MILLIGRAM(S): at 06:13

## 2020-08-20 RX ADMIN — TRAMADOL HYDROCHLORIDE 50 MILLIGRAM(S): 50 TABLET ORAL at 13:09

## 2020-08-20 RX ADMIN — TRAMADOL HYDROCHLORIDE 25 MILLIGRAM(S): 50 TABLET ORAL at 07:03

## 2020-08-20 RX ADMIN — Medication 650 MILLIGRAM(S): at 18:54

## 2020-08-20 RX ADMIN — Medication 650 MILLIGRAM(S): at 00:13

## 2020-08-20 RX ADMIN — TRAMADOL HYDROCHLORIDE 25 MILLIGRAM(S): 50 TABLET ORAL at 00:15

## 2020-08-20 RX ADMIN — Medication 650 MILLIGRAM(S): at 22:22

## 2020-08-20 RX ADMIN — Medication 325 MILLIGRAM(S): at 05:08

## 2020-08-20 RX ADMIN — TRAMADOL HYDROCHLORIDE 50 MILLIGRAM(S): 50 TABLET ORAL at 12:09

## 2020-08-20 NOTE — OCCUPATIONAL THERAPY INITIAL EVALUATION ADULT - GENERAL OBSERVATIONS, REHAB EVAL
R hand dominant, patient cleared for OT by BHASKAR Luna, patient received semi-supine, NAD, +IV, +L KI, +hopper, +bed alarm

## 2020-08-20 NOTE — PHYSICAL THERAPY INITIAL EVALUATION ADULT - CRITERIA FOR SKILLED THERAPEUTIC INTERVENTIONS
functional limitations in following categories/therapy frequency/anticipated discharge recommendation/impairments found/predicted duration of therapy intervention/anticipated equipment needs at discharge

## 2020-08-20 NOTE — OCCUPATIONAL THERAPY INITIAL EVALUATION ADULT - ADDITIONAL COMMENTS
Per patients daughter, patient was independent in functional mobility w/ no AD. Patient and wife have 24/7 HHA, however HHA predominantly for assist w/ wife. HHA supervises patient and gives verbal cues for ADLs and assist w/ IADLs at this time. No DME

## 2020-08-20 NOTE — PRE-OP CHECKLIST - SELECT TESTS ORDERED
Type and Screen/HCG/CXR/Urinalysis/Type and Cross/BMP/CBC/UCG/EKG
Type and Screen/Urinalysis/CBC/INR/CMP/PT/PTT

## 2020-08-20 NOTE — OCCUPATIONAL THERAPY INITIAL EVALUATION ADULT - MD ORDER
Per chart, 85M with Alzheimer's dementia, Prostate CA in past, HTN, hyperlipidemia, Squamous Cell Carcinoma, CRI (baseline Cr 3.04, based off of previous charts), brought in by aide secondary to fall at home w continued L hip pain. Pt demented at baseline.

## 2020-08-20 NOTE — OCCUPATIONAL THERAPY INITIAL EVALUATION ADULT - PLANNED THERAPY INTERVENTIONS, OT EVAL
motor coordination training/ADL retraining/strengthening/transfer training/cognitive, visual perceptual/neuromuscular re-education/balance training/bed mobility training/ROM

## 2020-08-20 NOTE — PRE-OP CHECKLIST - ISOLATION PRECAUTIONS
none
none
Eyes with no visual disturbances.  Ears clean and dry and no hearing difficulties. Nose with pink mucosa and no drainage.  Mouth mucous membranes moist and pink.  No tenderness or swelling to throat or neck.

## 2020-08-20 NOTE — OCCUPATIONAL THERAPY INITIAL EVALUATION ADULT - STANDING BALANCE: STATIC
poor plus/Patient able to stand at EOB w/ min assist x1 + posterior support of legs against bed, patient retropulsive. Further deferred 2/2 orthostasis see flowsheet for vitals

## 2020-08-20 NOTE — PROGRESS NOTE ADULT - SUBJECTIVE AND OBJECTIVE BOX
POST OPERATIVE DAY #:   STATUS POST: Left    Right  TKA/THR                        SUBJECTIVE: Patient seen and examined.   Denies any sob/cp/n/v/numbness or tingling in b/l     OBJECTIVE:     Vital Signs Last 24 Hrs  T(C): 36.8 (20 Aug 2020 08:28), Max: 37.2 (19 Aug 2020 19:33)  T(F): 98.3 (20 Aug 2020 08:28), Max: 98.9 (19 Aug 2020 19:33)  HR: 81 (20 Aug 2020 08:28) (62 - 93)  BP: 120/65 (20 Aug 2020 08:28) (120/65 - 150/82)  BP(mean): 99 (19 Aug 2020 19:05) (92 - 113)  RR: 18 (20 Aug 2020 08:28) (10 - 20)  SpO2: 97% (20 Aug 2020 08:28) (95% - 100%)    Affected extremity:          Dressing: clean/dry/intact          Sensation: intact to light touch to patient's baseline         Motor exam: EHL/TA/GS   Pulses             I&O's Detail    19 Aug 2020 07:01  -  20 Aug 2020 07:00  --------------------------------------------------------  IN:    lactated ringers.: 100 mL  Total IN: 100 mL    OUT:    Indwelling Catheter - Urethral: 570 mL  Total OUT: 570 mL    Total NET: -470 mL          LABS:                        8.1    7.44  )-----------( 138      ( 20 Aug 2020 06:37 )             25.1     08-20    142  |  110<H>  |  57<H>  ----------------------------<  163<H>  4.6   |  17<L>  |  4.96<H>    Ca    8.9      20 Aug 2020 06:37    TPro  7.4  /  Alb  4.3  /  TBili  0.5  /  DBili  x   /  AST  14  /  ALT  12  /  AlkPhos  124<H>  08-19    PT/INR - ( 18 Aug 2020 19:07 )   PT: 11.9 sec;   INR: 0.99          PTT - ( 18 Aug 2020 19:07 )  PTT:25.8 sec  Urinalysis Basic - ( 18 Aug 2020 21:26 )    Color: Yellow / Appearance: Clear / S.020 / pH: x  Gluc: x / Ketone: NEGATIVE  / Bili: Negative / Urobili: 0.2 E.U./dL   Blood: x / Protein: 30 mg/dL / Nitrite: NEGATIVE   Leuk Esterase: NEGATIVE / RBC: < 5 /HPF / WBC < 5 /HPF   Sq Epi: x / Non Sq Epi: 0-5 /HPF / Bacteria: Present /HPF        MEDICATIONS:    acetaminophen   Tablet .. 650 milliGRAM(s) Oral every 6 hours  acetaminophen   Tablet .. 650 milliGRAM(s) Oral every 6 hours PRN  ondansetron Injectable 4 milliGRAM(s) IV Push every 6 hours PRN  traMADol 25 milliGRAM(s) Oral every 6 hours PRN  traMADol 50 milliGRAM(s) Oral every 6 hours    aspirin enteric coated 325 milliGRAM(s) Oral two times a day        ASSESSMENT AND PLAN: 87yo Male s/p     1. Analgesic pain control  2. DVT prophylaxis: ASA      HSQ       Coumadin      Lovenox      SCDs      Other:   3. Weight Bearing Status:  Weight bearing as tolerated       NWB         Partial Weight Bearing  4. Disposition: Home          Subacute Rehab      pending PT evaluation POST OPERATIVE DAY #: 1  STATUS POST: Left hip hemiarthroplasty                  SUBJECTIVE: Patient seen and examined. Pt. seen at bedside with PCA feeding him. Pt. able to answer a few questions 2/2 to dementia. Pt. states he is "feeling more like himself."     OBJECTIVE:     Vital Signs Last 24 Hrs  T(C): 36.8 (20 Aug 2020 08:28), Max: 37.2 (19 Aug 2020 19:33)  T(F): 98.3 (20 Aug 2020 08:28), Max: 98.9 (19 Aug 2020 19:33)  HR: 81 (20 Aug 2020 08:28) (62 - 93)  BP: 120/65 (20 Aug 2020 08:28) (120/65 - 150/82)  BP(mean): 99 (19 Aug 2020 19:05) (92 - 113)  RR: 18 (20 Aug 2020 08:28) (10 - 20)  SpO2: 97% (20 Aug 2020 08:28) (95% - 100%)    General: pleasant, not able to fully follow commands, talks to himself  Affected extremity: left le          Dressing: clean/dry/intact          Sensation: intact to light touch to patient's baseline         Motor exam: EHL/TA/GS 5/5 able to move feet dorsi/plantarflexion on his own  Pulses 2+             I&O's Detail    19 Aug 2020 07:01  -  20 Aug 2020 07:00  --------------------------------------------------------  IN:    lactated ringers.: 100 mL  Total IN: 100 mL    OUT:    Indwelling Catheter - Urethral: 570 mL  Total OUT: 570 mL    Total NET: -470 mL          LABS:                        8.1    7.44  )-----------( 138      ( 20 Aug 2020 06:37 )             25.1     08-20    142  |  110<H>  |  57<H>  ----------------------------<  163<H>  4.6   |  17<L>  |  4.96<H>    Ca    8.9      20 Aug 2020 06:37    TPro  7.4  /  Alb  4.3  /  TBili  0.5  /  DBili  x   /  AST  14  /  ALT  12  /  AlkPhos  124<H>  08-19    PT/INR - ( 18 Aug 2020 19:07 )   PT: 11.9 sec;   INR: 0.99          PTT - ( 18 Aug 2020 19:07 )  PTT:25.8 sec  Urinalysis Basic - ( 18 Aug 2020 21:26 )    Color: Yellow / Appearance: Clear / S.020 / pH: x  Gluc: x / Ketone: NEGATIVE  / Bili: Negative / Urobili: 0.2 E.U./dL   Blood: x / Protein: 30 mg/dL / Nitrite: NEGATIVE   Leuk Esterase: NEGATIVE / RBC: < 5 /HPF / WBC < 5 /HPF   Sq Epi: x / Non Sq Epi: 0-5 /HPF / Bacteria: Present /HPF        MEDICATIONS:    acetaminophen   Tablet .. 650 milliGRAM(s) Oral every 6 hours  acetaminophen   Tablet .. 650 milliGRAM(s) Oral every 6 hours PRN  ondansetron Injectable 4 milliGRAM(s) IV Push every 6 hours PRN  traMADol 25 milliGRAM(s) Oral every 6 hours PRN  traMADol 50 milliGRAM(s) Oral every 6 hours    aspirin enteric coated 325 milliGRAM(s) Oral two times a day        ASSESSMENT AND PLAN: 85yo Male s/p left hip hemiarthroplasty    1. Analgesic pain control  2. DVT prophylaxis: ASA        3. Weight Bearing Status:  Weight bearing as tolerated      4. Disposition: home when cleared by PT. Pt. has HHA .   5. CKD- at baseline per neprhology appreciate any further recs  6. Dementia- at baseline  7. HTN- can give norvasc 5mg if not controlled per medicine recs. pt. currently normotensive.

## 2020-08-20 NOTE — PROGRESS NOTE ADULT - SUBJECTIVE AND OBJECTIVE BOX
INTERVAL HPI/OVERNIGHT EVENTS: RONALDO O/N    SUBJECTIVE: Patient seen and examined at bedside.   Pt in bed - states he feels comfortable and pain in hip controlled. Oriented to self and can state name. However remains tangential and asking when we are going to fix his hip. Denies fever, chest pain, dyspnea, nausea, abd pain. Casas remained in place    OBJECTIVE:    VITAL SIGNS:  ICU Vital Signs Last 24 Hrs  T(C): 36.8 (20 Aug 2020 20:49), Max: 36.8 (20 Aug 2020 05:14)  T(F): 98.2 (20 Aug 2020 20:49), Max: 98.3 (20 Aug 2020 08:28)  HR: 88 (20 Aug 2020 20:49) (81 - 108)  BP: 134/72 (20 Aug 2020 20:49) (74/40 - 143/75)  BP(mean): --  ABP: --  ABP(mean): --  RR: 18 (20 Aug 2020 20:49) (17 - 18)  SpO2: 98% (20 Aug 2020 20:49) (95% - 98%)        08-19 @ 07:01  -  08-20 @ 07:00  --------------------------------------------------------  IN: 100 mL / OUT: 570 mL / NET: -470 mL    08-20 @ 07:01  -  08-21 @ 00:58  --------------------------------------------------------  IN: 0 mL / OUT: 250 mL / NET: -250 mL      CAPILLARY BLOOD GLUCOSE          PHYSICAL EXAM:  GEN: Thin male in NAD  HEENT: NC/AT, MMM  CV: RRR, nml S1S2  PULM: nml effort, CTAB  ABD: Soft, NABS, non-tender  NEURO: Alert, oriented to self only, moving all extremities. L hip and knee flexion limited 2/2 pain. Sensation intact.   PSYCH: Appropriate, conversant      MEDICATIONS:  MEDICATIONS  (STANDING):  aspirin enteric coated 325 milliGRAM(s) Oral two times a day  celecoxib 200 milliGRAM(s) Oral two times a day  pantoprazole  Injectable 40 milliGRAM(s) IV Push daily  polyethylene glycol 3350 17 Gram(s) Oral daily  povidone iodine 5% Nasal Swab 1 Application(s) Both Nostrils once  sodium bicarbonate 650 milliGRAM(s) Oral every 8 hours    MEDICATIONS  (PRN):  acetaminophen   Tablet .. 650 milliGRAM(s) Oral every 6 hours PRN Temp greater or equal to 38C (100.4F), Mild Pain (1 - 3)  aluminum hydroxide/magnesium hydroxide/simethicone Suspension 30 milliLiter(s) Oral four times a day PRN Indigestion  bisacodyl Suppository 10 milliGRAM(s) Rectal daily PRN If no bowel movement by POD#2  ondansetron Injectable 4 milliGRAM(s) IV Push every 6 hours PRN Nausea and/or Vomiting  senna 2 Tablet(s) Oral at bedtime PRN Constipation  traMADol 25 milliGRAM(s) Oral every 6 hours PRN Moderate Pain (4 - 6)  traMADol 50 milliGRAM(s) Oral every 6 hours PRN Severe Pain (7 - 10)      ALLERGIES:  Allergies    penicillins (Rash)    Intolerances        LABS:                        8.1    7.44  )-----------( 138      ( 20 Aug 2020 06:37 )             25.1     08-20    142  |  110<H>  |  57<H>  ----------------------------<  163<H>  4.6   |  17<L>  |  4.96<H>    Ca    8.9      20 Aug 2020 06:37    TPro  7.4  /  Alb  4.3  /  TBili  0.5  /  DBili  x   /  AST  14  /  ALT  12  /  AlkPhos  124<H>  08-19          RADIOLOGY & ADDITIONAL TESTS: Reviewed.

## 2020-08-20 NOTE — PROGRESS NOTE ADULT - SUBJECTIVE AND OBJECTIVE BOX
Patient is a 86y Male seen and evaluated at bedside. S/p hip fx repair, NAD, comfortable, chatting with himself.    Meds:    acetaminophen   Tablet .. 650 every 6 hours  acetaminophen   Tablet .. 650 every 6 hours PRN  aluminum hydroxide/magnesium hydroxide/simethicone Suspension 30 four times a day PRN  aspirin enteric coated 325 two times a day  lactated ringers. 1000 <Continuous>  ondansetron Injectable 4 every 6 hours PRN  pantoprazole  Injectable 40 daily  polyethylene glycol 3350 17 daily  povidone iodine 5% Nasal Swab 1 once  senna 2 at bedtime PRN  traMADol 25 every 6 hours PRN  traMADol 50 every 6 hours      T(C): , Max: 37.2 (20 @ 19:33)  T(F): , Max: 98.9 (20 @ 19:33)  HR: 87 (20 @ 10:45)  BP: 135/69 (20 @ 10:45)  BP(mean): 99 (20 @ 19:05)  RR: 18 (20 @ 08:28)  SpO2: 97% (20 @ 08:28)  Wt(kg): --     @ 07:01  -   @ 07:00  --------------------------------------------------------  IN: 100 mL / OUT: 570 mL / NET: -470 mL    PHYSICAL EXAM:  GENERAL: Alert, awake, speaking; inappropriate answers to questions asked  CHEST/LUNG: Bilateral clear breath sounds, no rales, no crepitations, no wheezing  HEART: Regular rate and rhythm, no murmur  ABDOMEN: Soft, nontender, non distended, no guarding, no rigidity, no rebound tenderness  : No suprapubic tenderness.  EXTREMITIES: No pedal oedema, LLE immobilised    LABS:                        8.1    7.44  )-----------( 138      ( 20 Aug 2020 06:37 )             25.1     08-20    142  |  110<H>  |  57<H>  ----------------------------<  163<H>  4.6   |  17<L>  |  4.96<H>    Ca    8.9      20 Aug 2020 06:37    TPro  7.4  /  Alb  4.3  /  TBili  0.5  /  DBili  x   /  AST  14  /  ALT  12  /  AlkPhos  124<H>  08-19      PT/INR - ( 18 Aug 2020 19:07 )   PT: 11.9 sec;   INR: 0.99          PTT - ( 18 Aug 2020 19:07 )  PTT:25.8 sec  Urinalysis Basic - ( 18 Aug 2020 21:26 )    Color: Yellow / Appearance: Clear / S.020 / pH: x  Gluc: x / Ketone: NEGATIVE  / Bili: Negative / Urobili: 0.2 E.U./dL   Blood: x / Protein: 30 mg/dL / Nitrite: NEGATIVE   Leuk Esterase: NEGATIVE / RBC: < 5 /HPF / WBC < 5 /HPF   Sq Epi: x / Non Sq Epi: 0-5 /HPF / Bacteria: Present /HPF            RADIOLOGY & ADDITIONAL STUDIES:

## 2020-08-20 NOTE — PHYSICAL THERAPY INITIAL EVALUATION ADULT - ADDITIONAL COMMENTS
Pt. lives with his wife who is bed bound and has a 24 HHA. Prior to admission, Pt. was indpt with ambulation and transfers. HHA would provide some help with his ADLs and mostly attending to his wife's needs. He has hx of Alzheimer's and has needed more help from the HHA. Pt. lives with his wife who is bed bound and has a 24 HHA. Prior to admission, Pt. was indpt with ambulation and transfers. HHA would provide some help with his ADLs and mostly attending to his wife's needs. He has hx of Alzheimer's and has needed more help from the HHA. Information obtained from Daughter Sarah.

## 2020-08-20 NOTE — SWALLOW BEDSIDE ASSESSMENT ADULT - SWALLOW EVAL: DIAGNOSIS
Pt p/w mild oral dysphagia characterized by prolonged mastication. Given this in setting of dementia diagnosis and difficulty w self-feeding/positioning, recommend a modified diet w aspiration precautions.

## 2020-08-20 NOTE — SWALLOW BEDSIDE ASSESSMENT ADULT - SLP GENERAL OBSERVATIONS
Pt was received awake in bed. He did not answer orientation questions nor follow commands. He required assistance to sit upright in bed and w tray set-up

## 2020-08-20 NOTE — PROGRESS NOTE ADULT - SUBJECTIVE AND OBJECTIVE BOX
SUBJECTIVE: Patient seen and examined, resting comfortably in bed, does not appear to be in pain.  Pt did well o/n  No f/c/n/v/cp/sob.     OBJECTIVE:  NAD  Vital Signs Last 24 Hrs  T(C): 36.8 (20 Aug 2020 05:14), Max: 37.2 (19 Aug 2020 19:33)  T(F): 98.2 (20 Aug 2020 05:14), Max: 98.9 (19 Aug 2020 19:33)  HR: 93 (20 Aug 2020 05:14) (62 - 93)  BP: 139/69 (20 Aug 2020 05:14) (126/78 - 158/83)  BP(mean): 99 (19 Aug 2020 19:05) (92 - 113)  RR: 17 (20 Aug 2020 05:14) (10 - 20)  SpO2: 95% (20 Aug 2020 05:14) (95% - 100%)    Gen: NAD, resting comfortably in bed, conversating but speech tangential  LLE: KI in place, aquacel c/d/i. Wiggling toes but not following directions well,                         9.4    11.70 )-----------( 142      ( 19 Aug 2020 18:51 )             28.3     08-19    142  |  108  |  53<H>  ----------------------------<  127<H>  4.0   |  19<L>  |  4.97<H>    Ca    9.5      19 Aug 2020 06:48    TPro  7.4  /  Alb  4.3  /  TBili  0.5  /  DBili  x   /  AST  14  /  ALT  12  /  AlkPhos  124<H>  08-19      A/P :  Pt is a 87yo Male s/p L hip hemiarthroplasty 8/19  -    Pain control  -    DVT ppx: SCD,  BID  -    PACU CBC stable     -    Weight bearing status: WBAT in KI  -    Physical Therapy  -    Dispo: Home

## 2020-08-20 NOTE — SWALLOW BEDSIDE ASSESSMENT ADULT - MODE OF PRESENTATION
self fed/fed by clinician/Benefited from pre-loaded spoon, and cut-up food in order to prevent over-filling his oral cavity

## 2020-08-20 NOTE — PROGRESS NOTE ADULT - SUBJECTIVE AND OBJECTIVE BOX
Ortho Post Op Check    Procedure: L hip chaparro  Surgeon: Isabel    Pt comfortable without complaints, pain controlled  Denies CP, SOB, N/V, numbness/tingling     Vital Signs Last 24 Hrs  T(C): 36.3 (08-19-20 @ 20:22), Max: 37.2 (08-19-20 @ 19:33)  T(F): 97.3 (08-19-20 @ 20:22), Max: 98.9 (08-19-20 @ 19:33)  HR: 69 (08-19-20 @ 20:22) (62 - 74)  BP: 150/82 (08-19-20 @ 20:22) (128/70 - 150/82)  BP(mean): 99 (08-19-20 @ 19:05) (92 - 113)  RR: 14 (08-19-20 @ 20:22) (10 - 20)  SpO2: 100% (08-19-20 @ 20:22) (100% - 100%)  AVSS    General: Pt Alert and oriented, NAD  L hip aquacell DSG C/D/I  KI in place   Sensation intact s/s/sp/dp/t and symmetric   Motor: EHL/FHL/TA/GS 5/5 and symmetric   2+ DP pulse  Toes WWP                          9.4    11.70 )-----------( 142      ( 19 Aug 2020 18:51 )             28.3   19 Aug 2020 06:48    142    |  108    |  53     ----------------------------<  127    4.0     |  19     |  4.97       TPro  7.4    /  Alb  4.3    /  TBili  0.5    /  DBili  x      /  AST  14     /  ALT  12     /  AlkPhos  124    19 Aug 2020 06:48      Post-op X-Ray: Hardware in place    A/P: 86yMale POD#0 s/p L hip chaparro  - Stable  - Pain Control  - DVT ppx: ASA  - Post op abx: ancef periop  - PT, WBS: WBAT in KI  - Dispo pending PT eval most likely rehab     Ortho Pager [phone]

## 2020-08-21 LAB
ANION GAP SERPL CALC-SCNC: 13 MMOL/L — SIGNIFICANT CHANGE UP (ref 5–17)
BUN SERPL-MCNC: 65 MG/DL — HIGH (ref 7–23)
CALCIUM SERPL-MCNC: 9.2 MG/DL — SIGNIFICANT CHANGE UP (ref 8.4–10.5)
CHLORIDE SERPL-SCNC: 110 MMOL/L — HIGH (ref 96–108)
CO2 SERPL-SCNC: 18 MMOL/L — LOW (ref 22–31)
CREAT ?TM UR-MCNC: 84 MG/DL — SIGNIFICANT CHANGE UP
CREAT SERPL-MCNC: 5.4 MG/DL — HIGH (ref 0.5–1.3)
GLUCOSE SERPL-MCNC: 115 MG/DL — HIGH (ref 70–99)
HCT VFR BLD CALC: 23.4 % — LOW (ref 39–50)
HCT VFR BLD CALC: 24.5 % — LOW (ref 39–50)
HGB BLD-MCNC: 7.5 G/DL — LOW (ref 13–17)
HGB BLD-MCNC: 8.2 G/DL — LOW (ref 13–17)
MCHC RBC-ENTMCNC: 31.5 PG — SIGNIFICANT CHANGE UP (ref 27–34)
MCHC RBC-ENTMCNC: 32.1 GM/DL — SIGNIFICANT CHANGE UP (ref 32–36)
MCHC RBC-ENTMCNC: 32.7 PG — SIGNIFICANT CHANGE UP (ref 27–34)
MCHC RBC-ENTMCNC: 33.5 GM/DL — SIGNIFICANT CHANGE UP (ref 32–36)
MCV RBC AUTO: 97.6 FL — SIGNIFICANT CHANGE UP (ref 80–100)
MCV RBC AUTO: 98.3 FL — SIGNIFICANT CHANGE UP (ref 80–100)
NRBC # BLD: 0 /100 WBCS — SIGNIFICANT CHANGE UP (ref 0–0)
NRBC # BLD: 0 /100 WBCS — SIGNIFICANT CHANGE UP (ref 0–0)
PLATELET # BLD AUTO: 123 K/UL — LOW (ref 150–400)
PLATELET # BLD AUTO: 135 K/UL — LOW (ref 150–400)
POTASSIUM SERPL-MCNC: 4.6 MMOL/L — SIGNIFICANT CHANGE UP (ref 3.5–5.3)
POTASSIUM SERPL-SCNC: 4.6 MMOL/L — SIGNIFICANT CHANGE UP (ref 3.5–5.3)
RBC # BLD: 2.38 M/UL — LOW (ref 4.2–5.8)
RBC # BLD: 2.51 M/UL — LOW (ref 4.2–5.8)
RBC # FLD: 13.2 % — SIGNIFICANT CHANGE UP (ref 10.3–14.5)
RBC # FLD: 13.3 % — SIGNIFICANT CHANGE UP (ref 10.3–14.5)
SODIUM SERPL-SCNC: 141 MMOL/L — SIGNIFICANT CHANGE UP (ref 135–145)
SODIUM UR-SCNC: 56 MMOL/L — SIGNIFICANT CHANGE UP
WBC # BLD: 8.54 K/UL — SIGNIFICANT CHANGE UP (ref 3.8–10.5)
WBC # BLD: 9.14 K/UL — SIGNIFICANT CHANGE UP (ref 3.8–10.5)
WBC # FLD AUTO: 8.54 K/UL — SIGNIFICANT CHANGE UP (ref 3.8–10.5)
WBC # FLD AUTO: 9.14 K/UL — SIGNIFICANT CHANGE UP (ref 3.8–10.5)

## 2020-08-21 PROCEDURE — 99233 SBSQ HOSP IP/OBS HIGH 50: CPT

## 2020-08-21 RX ADMIN — Medication 650 MILLIGRAM(S): at 06:25

## 2020-08-21 RX ADMIN — Medication 325 MILLIGRAM(S): at 06:24

## 2020-08-21 RX ADMIN — CELECOXIB 200 MILLIGRAM(S): 200 CAPSULE ORAL at 06:25

## 2020-08-21 RX ADMIN — TRAMADOL HYDROCHLORIDE 50 MILLIGRAM(S): 50 TABLET ORAL at 15:22

## 2020-08-21 RX ADMIN — TRAMADOL HYDROCHLORIDE 25 MILLIGRAM(S): 50 TABLET ORAL at 06:36

## 2020-08-21 RX ADMIN — TRAMADOL HYDROCHLORIDE 50 MILLIGRAM(S): 50 TABLET ORAL at 14:22

## 2020-08-21 RX ADMIN — TRAMADOL HYDROCHLORIDE 50 MILLIGRAM(S): 50 TABLET ORAL at 19:46

## 2020-08-21 RX ADMIN — Medication 325 MILLIGRAM(S): at 19:07

## 2020-08-21 RX ADMIN — Medication 650 MILLIGRAM(S): at 22:08

## 2020-08-21 RX ADMIN — PANTOPRAZOLE SODIUM 40 MILLIGRAM(S): 20 TABLET, DELAYED RELEASE ORAL at 13:24

## 2020-08-21 RX ADMIN — Medication 650 MILLIGRAM(S): at 13:24

## 2020-08-21 RX ADMIN — POLYETHYLENE GLYCOL 3350 17 GRAM(S): 17 POWDER, FOR SOLUTION ORAL at 13:23

## 2020-08-21 NOTE — PROGRESS NOTE ADULT - SUBJECTIVE AND OBJECTIVE BOX
Patient is a 86y Male seen and evaluated at bedside. Ready to go home, pleasantly demented.    Meds:    acetaminophen   Tablet .. 650 every 6 hours PRN  aluminum hydroxide/magnesium hydroxide/simethicone Suspension 30 four times a day PRN  aspirin enteric coated 325 two times a day  bisacodyl Suppository 10 daily PRN  ondansetron Injectable 4 every 6 hours PRN  pantoprazole  Injectable 40 daily  polyethylene glycol 3350 17 daily  povidone iodine 5% Nasal Swab 1 once  senna 2 at bedtime PRN  sodium bicarbonate 650 every 8 hours  traMADol 25 every 6 hours PRN  traMADol 50 every 6 hours PRN      T(C): , Max: 37.1 (08-21-20 @ 08:30)  T(F): , Max: 98.8 (08-21-20 @ 08:30)  HR: 88 (08-21-20 @ 13:20)  BP: 152/78 (08-21-20 @ 13:20)  BP(mean): --  RR: 17 (08-21-20 @ 08:30)  SpO2: 98% (08-21-20 @ 08:30)  Wt(kg): --    08-20 @ 07:01  -  08-21 @ 07:00  --------------------------------------------------------  IN: 0 mL / OUT: 250 mL / NET: -250 mL    PHYSICAL EXAM:  GENERAL: Alert, awake, speaking; inappropriate answers to questions asked  CHEST/LUNG: Bilateral clear breath sounds, no rales, no crepitations, no wheezing  HEART: Regular rate and rhythm, no murmur  ABDOMEN: Soft, nontender, non distended, no guarding, no rigidity, no rebound tenderness;   : Bladder percussed to umbilicus  EXTREMITIES: No pedal oedema, LLE immobilised      LABS:                        7.5    9.14  )-----------( 135      ( 21 Aug 2020 07:06 )             23.4     08-21    141  |  110<H>  |  65<H>  ----------------------------<  115<H>  4.6   |  18<L>  |  5.40<H>    Ca    9.2      21 Aug 2020 07:06                  RADIOLOGY & ADDITIONAL STUDIES:

## 2020-08-21 NOTE — PROGRESS NOTE ADULT - SUBJECTIVE AND OBJECTIVE BOX
SUBJECTIVE: Patient seen and examined, resting comfortably in bed, does not appear to be in pain.  Pt did well o/n  No f/c/n/v/cp/sob.     Vital Signs Last 24 Hrs  T(C): 36.4 (21 Aug 2020 06:22), Max: 36.8 (20 Aug 2020 08:28)  T(F): 97.6 (21 Aug 2020 06:22), Max: 98.3 (20 Aug 2020 08:28)  HR: 86 (21 Aug 2020 06:22) (81 - 108)  BP: 147/71 (21 Aug 2020 06:22) (74/40 - 147/71)  RR: 16 (21 Aug 2020 06:22) (16 - 18)  SpO2: 97% (21 Aug 2020 06:22) (97% - 98%)        Gen: NAD, resting comfortably in bed  LLE: KI in place, aquacel c/d/i. Wiggling toes but not following directions well                                     7.5    9.14  )-----------( 135      ( 21 Aug 2020 07:06 )             23.4     08-21    141  |  110<H>  |  65<H>  ----------------------------<  115<H>  4.6   |  18<L>  |  5.40<H>    Ca    9.2      21 Aug 2020 07:06        A/P :  Pt is a 87yo Male s/p L hip hemiarthroplasty 8/19  -    Pain control  -    DVT ppx: SCD,  BID  -    PACU CBC stable     -    Weight bearing status: WBAT in KI  -    Physical Therapy  -    Please change aquacel prior to DC  -    Dispo: Home w/ 24h care

## 2020-08-21 NOTE — PROCEDURE NOTE - NSURITECHNIQUE_GU_A_CORE
The site was cleaned with soap/water and sterile solution (betadine)/The urinary drainage system is closed at the end of the procedure/Proper hand hygiene was performed/A sterile drape was used to cover all adjacent areas/The catheter was appropriately lubricated/The collection bag is below the level of the patient and urinary bladder/The catheter was secured with a securement device (e.g. StatLock)/All applicable medical record documentation is completed/Sterile gloves were worn for the duration of the procedure

## 2020-08-21 NOTE — PROGRESS NOTE ADULT - SUBJECTIVE AND OBJECTIVE BOX
Ortho Note    Patient seen and examined, resting comfortably in bed, does not appear to be in pain.  Pt did well o/n  No f/c/n/v/cp/sob.     Vital Signs Last 24 Hrs  T(C): 37.1 (08-21-20 @ 08:30), Max: 37.1 (08-21-20 @ 08:30)  T(F): 98.8 (08-21-20 @ 08:30), Max: 98.8 (08-21-20 @ 08:30)  HR: 84 (08-21-20 @ 08:30) (84 - 86)  BP: 135/76 (08-21-20 @ 08:30) (135/76 - 147/71)  BP(mean): --  RR: 17 (08-21-20 @ 08:30) (16 - 17)  SpO2: 98% (08-21-20 @ 08:30) (97% - 98%)      General: Pt Alert to person as consistent with baseline, NAD  DSG aquacel with some scant blood without any active drainage  Pulses: DPs palpable b/l le   Sensation: SILT throughout distal le   Motor: EHL/FHL/TA/GS firing b/l                           7.5    9.14  )-----------( 135      ( 21 Aug 2020 07:06 )             23.4   21 Aug 2020 07:06    141    |  110    |  65     ----------------------------<  115    4.6     |  18     |  5.40     Ca    9.2        21 Aug 2020 07:06        A/P: 86yMale POD#2 s/p L hip hemiarthroplasty  - Stable  - Pain Control as needed celebrex dced in setting of CKD  - nephro following will continue to appreciate recs  - DVT ppx:  asa  - PT, WBS: wbat in KI  - continue to appreciate labs  - dispo home with 24hr home care when cleared by PT and medicine    Ortho Pager [phone]

## 2020-08-21 NOTE — PROGRESS NOTE ADULT - SUBJECTIVE AND OBJECTIVE BOX
INTERVAL HPI/OVERNIGHT EVENTS: RONALDO O/N    SUBJECTIVE: Patient seen and examined at bedside.   Pt resting comfortably in bed. Alert, oriented to self. Denies pain in R Hip. Denies chest pain, dyspnea, nausea, abd pain. However due to mental status, unable to complete full ROS    **Pt noted to be more conversant w PT today. Per report due to pt dementia, difficult to elicit whether pt felt lightheaded or dizzy. However noted SBP decreased 130 from 158.    OBJECTIVE:    VITAL SIGNS:  ICU Vital Signs Last 24 Hrs  T(C): 36.7 (21 Aug 2020 15:31), Max: 37.1 (21 Aug 2020 08:30)  T(F): 98.1 (21 Aug 2020 15:31), Max: 98.8 (21 Aug 2020 08:30)  HR: 82 (21 Aug 2020 15:31) (82 - 88)  BP: 132/64 (21 Aug 2020 15:31) (132/64 - 158/79)  BP(mean): --  ABP: --  ABP(mean): --  RR: 17 (21 Aug 2020 15:31) (16 - 18)  SpO2: 98% (21 Aug 2020 15:31) (97% - 98%)        08-20 @ 07:01  -  08-21 @ 07:00  --------------------------------------------------------  IN: 0 mL / OUT: 250 mL / NET: -250 mL    08-21 @ 07:01  -  08-21 @ 17:08  --------------------------------------------------------  IN: 0 mL / OUT: 500 mL / NET: -500 mL      CAPILLARY BLOOD GLUCOSE          PHYSICAL EXAM:  GEN: Thin male in NAD  HEENT: NC/AT, MMM  CV: RRR, nml S1S2  PULM: nml effort, CTAB  ABD: Soft, NABS, non-tender  NEURO: Alert, oriented to self only, moving all extremities. L hip and knee flexion limited 2/2 pain. Sensation intact.   PSYCH: Appropriate, conversant      MEDICATIONS:  MEDICATIONS  (STANDING):  aspirin enteric coated 325 milliGRAM(s) Oral two times a day  pantoprazole  Injectable 40 milliGRAM(s) IV Push daily  polyethylene glycol 3350 17 Gram(s) Oral daily  povidone iodine 5% Nasal Swab 1 Application(s) Both Nostrils once  sodium bicarbonate 650 milliGRAM(s) Oral every 8 hours    MEDICATIONS  (PRN):  acetaminophen   Tablet .. 650 milliGRAM(s) Oral every 6 hours PRN Temp greater or equal to 38C (100.4F), Mild Pain (1 - 3)  aluminum hydroxide/magnesium hydroxide/simethicone Suspension 30 milliLiter(s) Oral four times a day PRN Indigestion  bisacodyl Suppository 10 milliGRAM(s) Rectal daily PRN If no bowel movement by POD#2  ondansetron Injectable 4 milliGRAM(s) IV Push every 6 hours PRN Nausea and/or Vomiting  senna 2 Tablet(s) Oral at bedtime PRN Constipation  traMADol 25 milliGRAM(s) Oral every 6 hours PRN Moderate Pain (4 - 6)  traMADol 50 milliGRAM(s) Oral every 6 hours PRN Severe Pain (7 - 10)      ALLERGIES:  Allergies    penicillins (Rash)    Intolerances        LABS:                        7.5    9.14  )-----------( 135      ( 21 Aug 2020 07:06 )             23.4     08-21    141  |  110<H>  |  65<H>  ----------------------------<  115<H>  4.6   |  18<L>  |  5.40<H>    Ca    9.2      21 Aug 2020 07:06            RADIOLOGY & ADDITIONAL TESTS: Reviewed.

## 2020-08-21 NOTE — PROGRESS NOTE ADULT - ATTENDING COMMENTS
pt examined and discussed.  bladder protuberance evident by percussion and confirmed by sono.  will place hopper.  discussed with team.

## 2020-08-22 ENCOUNTER — TRANSCRIPTION ENCOUNTER (OUTPATIENT)
Age: 85
End: 2020-08-22

## 2020-08-22 VITALS
SYSTOLIC BLOOD PRESSURE: 135 MMHG | DIASTOLIC BLOOD PRESSURE: 73 MMHG | TEMPERATURE: 98 F | OXYGEN SATURATION: 96 % | RESPIRATION RATE: 18 BRPM | HEART RATE: 81 BPM

## 2020-08-22 LAB
ANION GAP SERPL CALC-SCNC: 14 MMOL/L — SIGNIFICANT CHANGE UP (ref 5–17)
BUN SERPL-MCNC: 67 MG/DL — HIGH (ref 7–23)
CALCIUM SERPL-MCNC: 8.4 MG/DL — SIGNIFICANT CHANGE UP (ref 8.4–10.5)
CHLORIDE SERPL-SCNC: 108 MMOL/L — SIGNIFICANT CHANGE UP (ref 96–108)
CO2 SERPL-SCNC: 17 MMOL/L — LOW (ref 22–31)
CREAT SERPL-MCNC: 5.07 MG/DL — HIGH (ref 0.5–1.3)
GLUCOSE SERPL-MCNC: 100 MG/DL — HIGH (ref 70–99)
HCT VFR BLD CALC: 24.3 % — LOW (ref 39–50)
HGB BLD-MCNC: 8 G/DL — LOW (ref 13–17)
MCHC RBC-ENTMCNC: 32 PG — SIGNIFICANT CHANGE UP (ref 27–34)
MCHC RBC-ENTMCNC: 32.9 GM/DL — SIGNIFICANT CHANGE UP (ref 32–36)
MCV RBC AUTO: 97.2 FL — SIGNIFICANT CHANGE UP (ref 80–100)
NRBC # BLD: 0 /100 WBCS — SIGNIFICANT CHANGE UP (ref 0–0)
PLATELET # BLD AUTO: 120 K/UL — LOW (ref 150–400)
POTASSIUM SERPL-MCNC: 4.5 MMOL/L — SIGNIFICANT CHANGE UP (ref 3.5–5.3)
POTASSIUM SERPL-SCNC: 4.5 MMOL/L — SIGNIFICANT CHANGE UP (ref 3.5–5.3)
RBC # BLD: 2.5 M/UL — LOW (ref 4.2–5.8)
RBC # FLD: 13.6 % — SIGNIFICANT CHANGE UP (ref 10.3–14.5)
SODIUM SERPL-SCNC: 139 MMOL/L — SIGNIFICANT CHANGE UP (ref 135–145)
WBC # BLD: 7.83 K/UL — SIGNIFICANT CHANGE UP (ref 3.8–10.5)
WBC # FLD AUTO: 7.83 K/UL — SIGNIFICANT CHANGE UP (ref 3.8–10.5)

## 2020-08-22 PROCEDURE — 84300 ASSAY OF URINE SODIUM: CPT

## 2020-08-22 PROCEDURE — 80061 LIPID PANEL: CPT

## 2020-08-22 PROCEDURE — 36430 TRANSFUSION BLD/BLD COMPNT: CPT

## 2020-08-22 PROCEDURE — 85027 COMPLETE CBC AUTOMATED: CPT

## 2020-08-22 PROCEDURE — 36415 COLL VENOUS BLD VENIPUNCTURE: CPT

## 2020-08-22 PROCEDURE — 97530 THERAPEUTIC ACTIVITIES: CPT

## 2020-08-22 PROCEDURE — 82570 ASSAY OF URINE CREATININE: CPT

## 2020-08-22 PROCEDURE — 85730 THROMBOPLASTIN TIME PARTIAL: CPT

## 2020-08-22 PROCEDURE — 87635 SARS-COV-2 COVID-19 AMP PRB: CPT

## 2020-08-22 PROCEDURE — 84466 ASSAY OF TRANSFERRIN: CPT

## 2020-08-22 PROCEDURE — 70450 CT HEAD/BRAIN W/O DYE: CPT

## 2020-08-22 PROCEDURE — 99285 EMERGENCY DEPT VISIT HI MDM: CPT | Mod: 25

## 2020-08-22 PROCEDURE — 73501 X-RAY EXAM HIP UNI 1 VIEW: CPT

## 2020-08-22 PROCEDURE — 80048 BASIC METABOLIC PNL TOTAL CA: CPT

## 2020-08-22 PROCEDURE — 71045 X-RAY EXAM CHEST 1 VIEW: CPT

## 2020-08-22 PROCEDURE — 73502 X-RAY EXAM HIP UNI 2-3 VIEWS: CPT

## 2020-08-22 PROCEDURE — 86923 COMPATIBILITY TEST ELECTRIC: CPT

## 2020-08-22 PROCEDURE — 86901 BLOOD TYPING SEROLOGIC RH(D): CPT

## 2020-08-22 PROCEDURE — C1776: CPT

## 2020-08-22 PROCEDURE — 81001 URINALYSIS AUTO W/SCOPE: CPT

## 2020-08-22 PROCEDURE — 85025 COMPLETE CBC W/AUTO DIFF WBC: CPT

## 2020-08-22 PROCEDURE — 51702 INSERT TEMP BLADDER CATH: CPT

## 2020-08-22 PROCEDURE — 97161 PT EVAL LOW COMPLEX 20 MIN: CPT

## 2020-08-22 PROCEDURE — 82728 ASSAY OF FERRITIN: CPT

## 2020-08-22 PROCEDURE — 80053 COMPREHEN METABOLIC PANEL: CPT

## 2020-08-22 PROCEDURE — 97116 GAIT TRAINING THERAPY: CPT

## 2020-08-22 PROCEDURE — 83540 ASSAY OF IRON: CPT

## 2020-08-22 PROCEDURE — 85610 PROTHROMBIN TIME: CPT

## 2020-08-22 PROCEDURE — 86850 RBC ANTIBODY SCREEN: CPT

## 2020-08-22 PROCEDURE — 83550 IRON BINDING TEST: CPT

## 2020-08-22 PROCEDURE — 92610 EVALUATE SWALLOWING FUNCTION: CPT

## 2020-08-22 PROCEDURE — P9016: CPT

## 2020-08-22 RX ORDER — TRAMADOL HYDROCHLORIDE 50 MG/1
1 TABLET ORAL
Qty: 20 | Refills: 0
Start: 2020-08-22 | End: 2020-08-26

## 2020-08-22 RX ORDER — ASPIRIN/CALCIUM CARB/MAGNESIUM 324 MG
1 TABLET ORAL
Qty: 60 | Refills: 0
Start: 2020-08-22 | End: 2020-09-20

## 2020-08-22 RX ADMIN — POLYETHYLENE GLYCOL 3350 17 GRAM(S): 17 POWDER, FOR SOLUTION ORAL at 11:37

## 2020-08-22 RX ADMIN — PANTOPRAZOLE SODIUM 40 MILLIGRAM(S): 20 TABLET, DELAYED RELEASE ORAL at 11:37

## 2020-08-22 RX ADMIN — Medication 325 MILLIGRAM(S): at 06:05

## 2020-08-22 RX ADMIN — Medication 650 MILLIGRAM(S): at 06:05

## 2020-08-22 NOTE — DISCHARGE NOTE NURSING/CASE MANAGEMENT/SOCIAL WORK - PATIENT PORTAL LINK FT
You can access the FollowMyHealth Patient Portal offered by [facility] by registering at the following website: [email or web address]. By joining [facility]’s FollowMyHealth portal, you will also be able to view your health information using other applications (apps) compatible with our system.

## 2020-08-22 NOTE — DISCHARGE NOTE PROVIDER - HOSPITAL COURSE
Admitted    Surgery    Ashley-op Antibiotics    Pain control    DVT prophylaxis    OOB/Physical Therapy         hopper in place. needs home hopper care, to be removed by urology upon follow up. needs home catheter care

## 2020-08-22 NOTE — DISCHARGE NOTE PROVIDER - NSDCMRMEDTOKEN_GEN_ALL_CORE_FT
Aspirin Enteric Coated 325 mg oral delayed release tablet: 1 tab(s) orally 2 times a day MDD:2  traMADol 50 mg oral tablet: 1 tab(s) orally every 6 hours MDD:4

## 2020-08-22 NOTE — PROGRESS NOTE ADULT - SUBJECTIVE AND OBJECTIVE BOX
CC: CLOSED FRACTURE OF LEFTHIP, INITIAL ENCOUNTER      INTERVAL HISTORY:  lying in bed talking to himself  in NAD      ROS: No chest pain, no sob, no abd pain. No n/v/d    PAST MEDICAL & SURGICAL HISTORY:  Chronic kidney disease, unspecified CKD stage  Cancer of skin, squamous cell  Prostate CA  Alzheimer disease  Hyperlipidemia  HTN (hypertension)  No pertinent past medical history  H/O prostate cancer  No significant past surgical history  No significant past surgical history      PHYSICAL EXAM:  T(C): 36.9 (20 @ 05:00), Max: 37.1 (20 @ 20:05)  HR: 76 (20 @ 05:00)  BP: 146/73 (20 @ 05:00) (115/57 - 158/79)  RR: 17 (20 @ 05:00)  SpO2: 96% (20 @ 05:00)  Wt(kg): --  I&O's Summary    21 Aug 2020 07:01  -  22 Aug 2020 07:00  --------------------------------------------------------  IN: 0 mL / OUT: 1050 mL / NET: -1050 mL      Weight   General:   NAD.  HEENT: moist mucous membranes, no pallor/cyanosis.  Neck: no JVD visible.  Cardiac: S1, S2. RRR. No murmurs   Respratory: CTA b/l, no access muscle use.   Abdomen: soft. nontender. nondistended  Skin: no rashes.  Extremities: no LE edema b/l  Access:       DATA:                        8.0<L>  7.83  )-----------( 120<L>    ( 22 Aug 2020 05:49 )             24.3<L>    Ferritin, Serum: 320 ng/mL ( @ 19:08)      139    |  108    |  67<H>  ----------------------------<  100<H>  Ca:8.4   (22 Aug 2020 05:49)  4.5     |  17<L>  |  5.07<H>      eGFR if Non : 10 <L>  eGFR if : 11 <L>            Urinalysis Basic - ( 18 Aug 2020 21:26 )  Color: Yellow / Appearance: Clear / S.020 / pH: x  Gluc: x / Ketone: NEGATIVE  / Bili: Negative / Urobili: 0.2 E.U./dL   Blood: x / Protein: 30 mg/dL<!> / Nitrite: NEGATIVE   Leuk Esterase: NEGATIVE / RBC: < 5 /HPF / WBC < 5 /HPF   Sq Epi: x / Non Sq Epi: 0-5 /HPF / Bacteria: Present /HPF<!>      Sodium, Random Urine: 56 mmol/L ( @ 14:42)  Creatinine, Random Urine: 84 mg/dL ( @ 14:42)            MEDICATIONS  (STANDING):  aspirin enteric coated 325 milliGRAM(s) Oral two times a day  pantoprazole  Injectable 40 milliGRAM(s) IV Push daily  polyethylene glycol 3350 17 Gram(s) Oral daily  povidone iodine 5% Nasal Swab 1 Application(s) Both Nostrils once  sodium bicarbonate 650 milliGRAM(s) Oral every 8 hours    MEDICATIONS  (PRN):  acetaminophen   Tablet .. 650 milliGRAM(s) Oral every 6 hours PRN Temp greater or equal to 38C (100.4F), Mild Pain (1 - 3)  aluminum hydroxide/magnesium hydroxide/simethicone Suspension 30 milliLiter(s) Oral four times a day PRN Indigestion  bisacodyl Suppository 10 milliGRAM(s) Rectal daily PRN If no bowel movement by POD#2  ondansetron Injectable 4 milliGRAM(s) IV Push every 6 hours PRN Nausea and/or Vomiting  senna 2 Tablet(s) Oral at bedtime PRN Constipation  traMADol 25 milliGRAM(s) Oral every 6 hours PRN Moderate Pain (4 - 6)  traMADol 50 milliGRAM(s) Oral every 6 hours PRN Severe Pain (7 - 10)

## 2020-08-22 NOTE — DISCHARGE NOTE PROVIDER - CARE PROVIDER_API CALL
Brooklyn Hall)  Orthopaedic Surgery Surgery  [address] [address]  Phone: [phone]  Fax: [phone]  Follow Up Time:

## 2020-08-22 NOTE — PROGRESS NOTE ADULT - PROVIDER SPECIALTY LIST ADULT
Hospitalist
Hospitalist
Nephrology
Nephrology
Orthopedics
Nephrology

## 2020-08-22 NOTE — DISCHARGE NOTE PROVIDER - NSDCFUADDINST_GEN_ALL_CORE_FT
You may bear full weight as tolerated using a walker, cane, crutches as needed. Home physical therapy should start within 2 days after your discharge. Apply ice packs to the surgical site and elevate the leg above the level of the heart several times a day. Place a pillow behind your knee for comfort.     If desired, wear compression stockings during the first 5-10 days after surgery in order to reduce swelling. Take medications as prescribed. Keep dressing clean and dry. You can remove the dressing 5 days after surgery by yourself.     If you observe drainage call the office. If the incision is dry and there is no drainage you may shower. Do not soak or scrub the incision.     Follow-up in the office 2-3 weeks after surgery. Call to schedule an appointment. If you experience fever>101F, chills, pain (increasingly severe), redness of wound, or unusual drainage please call the office. If you experience chest pain or difficulty breathing, call 911.    hopper care should be established for home and follow up should be made with urology for further care/management in setting of increasing Cr. Urologist of choice may be used

## 2020-08-22 NOTE — PROGRESS NOTE ADULT - ASSESSMENT
86M w h/o Alzheimer's dementia (A/O x1-2), HTN, CKD, Anemia, prostate CA, p/w fall found to have L femoral neck fx, now s/p LEFT hemiarthroplasty of hip w Dr. Hall, now w urinary retention and SHARON on CKD
86M w h/o Alzheimer's dementia (A/O x1-2), HTN, CKD, Anemia, prostate CA, p/w fall found to have L femoral neck fx, now s/p LEFT hemiarthroplasty of hip w Dr. Hall    #Post-op Evaluation - pain appears controlled. On bowel regimen, IS, PPX; ASA. DISPO: TBD - pt has 24/7 home assistants  #L femoral neck fx - mgmt per orthopedics  #CKDV - Cr 4.9 (prior baseline 3.4). Nephrology following. Appears euvolemic  #HTN - Above target. Possibly complicated by pain. Holding amlodipine and lasix.   #Alzheimer's dementia - on memantine 7mg ER. prior records state pt had N/V w donepezil  #Normocytic anemia - 8.1 from 9.9 pre-op. Possibly from operative losses. Did not appear symptomatic. Iron studies nml - anemia likely also complicated at baseline by CKD    Plan  --Continue holding amlodipine and lasix in setting of orthostasis w PT. Appears euvolemic  --Appreciate Nephrology recs - started on bicarbonate supplementation  --Restart memantine 7mg ER  --F/U CBC    DISPO: likely home w 24/7 assist once clear PT (1-person assist)
86M w h/o Alzheimer's dementia (A/O x1-2), HTN, CKD, Anemia, prostate CA, p/w fall found to have L femoral neck fx, now s/p LEFT hemiarthroplasty of hip w Dr. Hall, now w urinary retention and SHARON on CKD    #Post-op Evaluation - pain appears controlled. On bowel regimen, IS, PPX; ASA. DISPO: TBD - pt has 24/7 home assistants  #L femoral neck fx - mgmt per orthopedics  #SHARON on CKDV - Cr increased to 5.4 from 4.9 (prior baseline 3.4). Nephrology following. Likely due to retention   --Casas placed w 700cc urine removed  #HTN - 130-150. Possibly complicated by pain. Holding amlodipine and lasix.   #Alzheimer's dementia - on memantine 7mg ER. prior records state pt had N/V w donepezil  #Normocytic anemia - continues to decreased 7.5 from 8.1 from 9.9 pre-op. Possibly from operative losses as well as fluids given. Drop in SBP today w PT. Iron studies nml    Plan  --Casas inserted. Start flomax for urinary retention - likely from decreased mobility post-op  --Appreciate Nephrology recs - started on bicarbonate supplementation  --Restart memantine 7mg ER  --1u RBC for orthostatic BPs; f/u CBC    DISPO: likely home w 24/7 assist once clear PT (1-person assist)
86M with Alzheimer's dementia, Prostate CA in past, HTN, hyperlipidemia, Squamous Cell Carcinoma, CRI (baseline Cr 3.04, based off of previous charts) s/p Left hip fx repair 8/19. Chart review shows CKD5 with creatinine 4.94 in July    BUN/creat 57/4.96- at baseline  Euvolaemic, electrolytes acceptable  Recommend dc'ing IVF  Hgb- 8.1 post Op  Metabolic acidosis, can supplement post op with Nabicarb 650mg q8
86M with Alzheimer's dementia, Prostate CA in past, HTN, hyperlipidemia, Squamous Cell Carcinoma, CRI (baseline Cr 3.04, based off of previous charts) s/p Left hip fx repair 8/19. Chart review shows CKD5 with creatinine 4.94 in July    BUN/creat elevated to 65/5.40- urinary bladder distended, bedside sono 550cc  Recommend replacing hopper; euvolaemic, electrolytes acceptable  Hgb- 7.5 post Op  Metabolic acidosis from chronic renal failure, continue Nabicarb 650mg q8h

## 2020-08-22 NOTE — DISCHARGE NOTE PROVIDER - CARE PROVIDERS DIRECT ADDRESSES
[email or web address]

## 2020-08-25 ENCOUNTER — EMERGENCY (EMERGENCY)
Facility: HOSPITAL | Age: 85
LOS: 1 days | Discharge: HOME | End: 2020-08-25
Attending: EMERGENCY MEDICINE | Admitting: HOSPITALIST
Payer: COMMERCIAL

## 2020-08-25 VITALS
HEIGHT: 62 IN | TEMPERATURE: 98 F | HEART RATE: 96 BPM | DIASTOLIC BLOOD PRESSURE: 81 MMHG | WEIGHT: 130.07 LBS | RESPIRATION RATE: 20 BRPM | SYSTOLIC BLOOD PRESSURE: 168 MMHG | OXYGEN SATURATION: 100 %

## 2020-08-25 DIAGNOSIS — E78.5 HYPERLIPIDEMIA, UNSPECIFIED: ICD-10-CM

## 2020-08-25 DIAGNOSIS — Z88.0 ALLERGY STATUS TO PENICILLIN: ICD-10-CM

## 2020-08-25 DIAGNOSIS — Z29.9 ENCOUNTER FOR PROPHYLACTIC MEASURES, UNSPECIFIED: ICD-10-CM

## 2020-08-25 DIAGNOSIS — N18.5 CHRONIC KIDNEY DISEASE, STAGE 5: ICD-10-CM

## 2020-08-25 DIAGNOSIS — E87.2 ACIDOSIS: ICD-10-CM

## 2020-08-25 DIAGNOSIS — G30.9 ALZHEIMER'S DISEASE, UNSPECIFIED: ICD-10-CM

## 2020-08-25 DIAGNOSIS — R31.9 HEMATURIA, UNSPECIFIED: ICD-10-CM

## 2020-08-25 DIAGNOSIS — N49.2 INFLAMMATORY DISORDERS OF SCROTUM: ICD-10-CM

## 2020-08-25 DIAGNOSIS — Z20.828 CONTACT WITH AND (SUSPECTED) EXPOSURE TO OTHER VIRAL COMMUNICABLE DISEASES: ICD-10-CM

## 2020-08-25 DIAGNOSIS — R33.9 RETENTION OF URINE, UNSPECIFIED: ICD-10-CM

## 2020-08-25 DIAGNOSIS — Z85.46 PERSONAL HISTORY OF MALIGNANT NEOPLASM OF PROSTATE: Chronic | ICD-10-CM

## 2020-08-25 DIAGNOSIS — D64.9 ANEMIA, UNSPECIFIED: ICD-10-CM

## 2020-08-25 DIAGNOSIS — T14.8XXA OTHER INJURY OF UNSPECIFIED BODY REGION, INITIAL ENCOUNTER: ICD-10-CM

## 2020-08-25 DIAGNOSIS — N39.0 URINARY TRACT INFECTION, SITE NOT SPECIFIED: ICD-10-CM

## 2020-08-25 LAB
ALBUMIN SERPL ELPH-MCNC: 3.2 G/DL — LOW (ref 3.3–5)
ALBUMIN SERPL ELPH-MCNC: 3.7 G/DL — SIGNIFICANT CHANGE UP (ref 3.3–5)
ALP SERPL-CCNC: 109 U/L — SIGNIFICANT CHANGE UP (ref 40–120)
ALP SERPL-CCNC: 98 U/L — SIGNIFICANT CHANGE UP (ref 40–120)
ALT FLD-CCNC: 11 U/L — SIGNIFICANT CHANGE UP (ref 10–45)
ALT FLD-CCNC: 12 U/L — SIGNIFICANT CHANGE UP (ref 10–45)
ANION GAP SERPL CALC-SCNC: 15 MMOL/L — SIGNIFICANT CHANGE UP (ref 5–17)
ANION GAP SERPL CALC-SCNC: 17 MMOL/L — SIGNIFICANT CHANGE UP (ref 5–17)
ANISOCYTOSIS BLD QL: SLIGHT — SIGNIFICANT CHANGE UP
APPEARANCE UR: ABNORMAL
APTT BLD: 28.8 SEC — SIGNIFICANT CHANGE UP (ref 27.5–35.5)
AST SERPL-CCNC: 34 U/L — SIGNIFICANT CHANGE UP (ref 10–40)
AST SERPL-CCNC: 36 U/L — SIGNIFICANT CHANGE UP (ref 10–40)
BACTERIA # UR AUTO: PRESENT /HPF
BASOPHILS # BLD AUTO: 0.02 K/UL — SIGNIFICANT CHANGE UP (ref 0–0.2)
BASOPHILS # BLD AUTO: 0.09 K/UL — SIGNIFICANT CHANGE UP (ref 0–0.2)
BASOPHILS NFR BLD AUTO: 0.2 % — SIGNIFICANT CHANGE UP (ref 0–2)
BASOPHILS NFR BLD AUTO: 0.9 % — SIGNIFICANT CHANGE UP (ref 0–2)
BILIRUB SERPL-MCNC: 0.4 MG/DL — SIGNIFICANT CHANGE UP (ref 0.2–1.2)
BILIRUB SERPL-MCNC: 0.4 MG/DL — SIGNIFICANT CHANGE UP (ref 0.2–1.2)
BILIRUB UR-MCNC: NEGATIVE — SIGNIFICANT CHANGE UP
BLD GP AB SCN SERPL QL: NEGATIVE — SIGNIFICANT CHANGE UP
BLD GP AB SCN SERPL QL: NEGATIVE — SIGNIFICANT CHANGE UP
BUN SERPL-MCNC: 81 MG/DL — HIGH (ref 7–23)
BUN SERPL-MCNC: 83 MG/DL — HIGH (ref 7–23)
BURR CELLS BLD QL SMEAR: PRESENT — SIGNIFICANT CHANGE UP
CALCIUM SERPL-MCNC: 9.4 MG/DL — SIGNIFICANT CHANGE UP (ref 8.4–10.5)
CALCIUM SERPL-MCNC: 9.6 MG/DL — SIGNIFICANT CHANGE UP (ref 8.4–10.5)
CHLORIDE SERPL-SCNC: 106 MMOL/L — SIGNIFICANT CHANGE UP (ref 96–108)
CHLORIDE SERPL-SCNC: 108 MMOL/L — SIGNIFICANT CHANGE UP (ref 96–108)
CO2 SERPL-SCNC: 18 MMOL/L — LOW (ref 22–31)
CO2 SERPL-SCNC: 19 MMOL/L — LOW (ref 22–31)
COLOR SPEC: ABNORMAL
CREAT SERPL-MCNC: 5.03 MG/DL — HIGH (ref 0.5–1.3)
CREAT SERPL-MCNC: 5.22 MG/DL — HIGH (ref 0.5–1.3)
DIFF PNL FLD: ABNORMAL
EOSINOPHIL # BLD AUTO: 0.09 K/UL — SIGNIFICANT CHANGE UP (ref 0–0.5)
EOSINOPHIL # BLD AUTO: 0.27 K/UL — SIGNIFICANT CHANGE UP (ref 0–0.5)
EOSINOPHIL NFR BLD AUTO: 0.9 % — SIGNIFICANT CHANGE UP (ref 0–6)
EOSINOPHIL NFR BLD AUTO: 2.9 % — SIGNIFICANT CHANGE UP (ref 0–6)
EPI CELLS # UR: SIGNIFICANT CHANGE UP /HPF (ref 0–5)
GIANT PLATELETS BLD QL SMEAR: PRESENT — SIGNIFICANT CHANGE UP
GLUCOSE SERPL-MCNC: 121 MG/DL — HIGH (ref 70–99)
GLUCOSE SERPL-MCNC: 169 MG/DL — HIGH (ref 70–99)
GLUCOSE UR QL: 100
GRAN CASTS # UR COMP ASSIST: ABNORMAL /LPF
HCT VFR BLD CALC: 24.5 % — LOW (ref 39–50)
HCT VFR BLD CALC: 25.6 % — LOW (ref 39–50)
HGB BLD-MCNC: 7.8 G/DL — LOW (ref 13–17)
HGB BLD-MCNC: 8.3 G/DL — LOW (ref 13–17)
HYALINE CASTS # UR AUTO: SIGNIFICANT CHANGE UP /LPF (ref 0–2)
HYPOCHROMIA BLD QL: SLIGHT — SIGNIFICANT CHANGE UP
IMM GRANULOCYTES NFR BLD AUTO: 5 % — HIGH (ref 0–1.5)
INR BLD: 1.04 — SIGNIFICANT CHANGE UP (ref 0.88–1.16)
KETONES UR-MCNC: NEGATIVE — SIGNIFICANT CHANGE UP
LACTATE SERPL-SCNC: 1.2 MMOL/L — SIGNIFICANT CHANGE UP (ref 0.5–2)
LEUKOCYTE ESTERASE UR-ACNC: ABNORMAL
LYMPHOCYTES # BLD AUTO: 0.85 K/UL — LOW (ref 1–3.3)
LYMPHOCYTES # BLD AUTO: 1.01 K/UL — SIGNIFICANT CHANGE UP (ref 1–3.3)
LYMPHOCYTES # BLD AUTO: 9.1 % — LOW (ref 13–44)
LYMPHOCYTES # BLD AUTO: 9.7 % — LOW (ref 13–44)
MACROCYTES BLD QL: SLIGHT — SIGNIFICANT CHANGE UP
MANUAL SMEAR VERIFICATION: SIGNIFICANT CHANGE UP
MCHC RBC-ENTMCNC: 31.8 GM/DL — LOW (ref 32–36)
MCHC RBC-ENTMCNC: 32.1 PG — SIGNIFICANT CHANGE UP (ref 27–34)
MCHC RBC-ENTMCNC: 32.3 PG — SIGNIFICANT CHANGE UP (ref 27–34)
MCHC RBC-ENTMCNC: 32.4 GM/DL — SIGNIFICANT CHANGE UP (ref 32–36)
MCV RBC AUTO: 100.8 FL — HIGH (ref 80–100)
MCV RBC AUTO: 99.6 FL — SIGNIFICANT CHANGE UP (ref 80–100)
METAMYELOCYTES # FLD: 0.9 % — HIGH (ref 0–0)
MONOCYTES # BLD AUTO: 0.76 K/UL — SIGNIFICANT CHANGE UP (ref 0–0.9)
MONOCYTES # BLD AUTO: 0.92 K/UL — HIGH (ref 0–0.9)
MONOCYTES NFR BLD AUTO: 8.2 % — SIGNIFICANT CHANGE UP (ref 2–14)
MONOCYTES NFR BLD AUTO: 8.8 % — SIGNIFICANT CHANGE UP (ref 2–14)
MYELOCYTES NFR BLD: 1.8 % — HIGH (ref 0–0)
NEUTROPHILS # BLD AUTO: 6.95 K/UL — SIGNIFICANT CHANGE UP (ref 1.8–7.4)
NEUTROPHILS # BLD AUTO: 8.04 K/UL — HIGH (ref 1.8–7.4)
NEUTROPHILS NFR BLD AUTO: 74.6 % — SIGNIFICANT CHANGE UP (ref 43–77)
NEUTROPHILS NFR BLD AUTO: 77 % — SIGNIFICANT CHANGE UP (ref 43–77)
NITRITE UR-MCNC: NEGATIVE — SIGNIFICANT CHANGE UP
NRBC # BLD: 0 /100 WBCS — SIGNIFICANT CHANGE UP (ref 0–0)
OVALOCYTES BLD QL SMEAR: SLIGHT — SIGNIFICANT CHANGE UP
PH UR: 6 — SIGNIFICANT CHANGE UP (ref 5–8)
PLAT MORPH BLD: NORMAL — SIGNIFICANT CHANGE UP
PLATELET # BLD AUTO: 236 K/UL — SIGNIFICANT CHANGE UP (ref 150–400)
PLATELET # BLD AUTO: 246 K/UL — SIGNIFICANT CHANGE UP (ref 150–400)
POIKILOCYTOSIS BLD QL AUTO: SLIGHT — SIGNIFICANT CHANGE UP
POLYCHROMASIA BLD QL SMEAR: SLIGHT — SIGNIFICANT CHANGE UP
POTASSIUM SERPL-MCNC: 4.5 MMOL/L — SIGNIFICANT CHANGE UP (ref 3.5–5.3)
POTASSIUM SERPL-MCNC: 4.8 MMOL/L — SIGNIFICANT CHANGE UP (ref 3.5–5.3)
POTASSIUM SERPL-SCNC: 4.5 MMOL/L — SIGNIFICANT CHANGE UP (ref 3.5–5.3)
POTASSIUM SERPL-SCNC: 4.8 MMOL/L — SIGNIFICANT CHANGE UP (ref 3.5–5.3)
PROT SERPL-MCNC: 6.5 G/DL — SIGNIFICANT CHANGE UP (ref 6–8.3)
PROT SERPL-MCNC: 7.1 G/DL — SIGNIFICANT CHANGE UP (ref 6–8.3)
PROT UR-MCNC: 100 MG/DL
PROTHROM AB SERPL-ACNC: 12.5 SEC — SIGNIFICANT CHANGE UP (ref 10.6–13.6)
RBC # BLD: 2.43 M/UL — LOW (ref 4.2–5.8)
RBC # BLD: 2.57 M/UL — LOW (ref 4.2–5.8)
RBC # FLD: 13.3 % — SIGNIFICANT CHANGE UP (ref 10.3–14.5)
RBC # FLD: 13.5 % — SIGNIFICANT CHANGE UP (ref 10.3–14.5)
RBC BLD AUTO: ABNORMAL
RBC CASTS # UR COMP ASSIST: SIGNIFICANT CHANGE UP /HPF
RH IG SCN BLD-IMP: POSITIVE — SIGNIFICANT CHANGE UP
RH IG SCN BLD-IMP: POSITIVE — SIGNIFICANT CHANGE UP
SARS-COV-2 RNA SPEC QL NAA+PROBE: SIGNIFICANT CHANGE UP
SCHISTOCYTES BLD QL AUTO: SLIGHT — SIGNIFICANT CHANGE UP
SODIUM SERPL-SCNC: 140 MMOL/L — SIGNIFICANT CHANGE UP (ref 135–145)
SODIUM SERPL-SCNC: 143 MMOL/L — SIGNIFICANT CHANGE UP (ref 135–145)
SP GR SPEC: 1.02 — SIGNIFICANT CHANGE UP (ref 1–1.03)
SPHEROCYTES BLD QL SMEAR: SLIGHT — SIGNIFICANT CHANGE UP
UROBILINOGEN FLD QL: 1 E.U./DL — SIGNIFICANT CHANGE UP
WBC # BLD: 10.44 K/UL — SIGNIFICANT CHANGE UP (ref 3.8–10.5)
WBC # BLD: 9.32 K/UL — SIGNIFICANT CHANGE UP (ref 3.8–10.5)
WBC # FLD AUTO: 10.44 K/UL — SIGNIFICANT CHANGE UP (ref 3.8–10.5)
WBC # FLD AUTO: 9.32 K/UL — SIGNIFICANT CHANGE UP (ref 3.8–10.5)
WBC UR QL: ABNORMAL /HPF

## 2020-08-25 PROCEDURE — 99223 1ST HOSP IP/OBS HIGH 75: CPT | Mod: GC

## 2020-08-25 PROCEDURE — 93971 EXTREMITY STUDY: CPT | Mod: 26,LT

## 2020-08-25 PROCEDURE — 71045 X-RAY EXAM CHEST 1 VIEW: CPT | Mod: 26

## 2020-08-25 PROCEDURE — 99285 EMERGENCY DEPT VISIT HI MDM: CPT

## 2020-08-25 PROCEDURE — 93010 ELECTROCARDIOGRAM REPORT: CPT

## 2020-08-25 PROCEDURE — 76870 US EXAM SCROTUM: CPT | Mod: 26

## 2020-08-25 RX ORDER — MEMANTINE HYDROCHLORIDE 10 MG/1
5 TABLET ORAL DAILY
Refills: 0 | Status: DISCONTINUED | OUTPATIENT
Start: 2020-08-25 | End: 2020-08-26

## 2020-08-25 RX ORDER — CEFTRIAXONE 500 MG/1
1000 INJECTION, POWDER, FOR SOLUTION INTRAMUSCULAR; INTRAVENOUS EVERY 24 HOURS
Refills: 0 | Status: DISCONTINUED | OUTPATIENT
Start: 2020-08-25 | End: 2020-08-26

## 2020-08-25 RX ORDER — VANCOMYCIN HCL 1 G
1000 VIAL (EA) INTRAVENOUS ONCE
Refills: 0 | Status: COMPLETED | OUTPATIENT
Start: 2020-08-25 | End: 2020-08-25

## 2020-08-25 RX ORDER — ATORVASTATIN CALCIUM 80 MG/1
20 TABLET, FILM COATED ORAL AT BEDTIME
Refills: 0 | Status: DISCONTINUED | OUTPATIENT
Start: 2020-08-25 | End: 2020-08-26

## 2020-08-25 RX ORDER — TAMSULOSIN HYDROCHLORIDE 0.4 MG/1
0.4 CAPSULE ORAL AT BEDTIME
Refills: 0 | Status: DISCONTINUED | OUTPATIENT
Start: 2020-08-25 | End: 2020-08-26

## 2020-08-25 RX ORDER — CEFTRIAXONE 500 MG/1
1000 INJECTION, POWDER, FOR SOLUTION INTRAMUSCULAR; INTRAVENOUS EVERY 24 HOURS
Refills: 0 | Status: DISCONTINUED | OUTPATIENT
Start: 2020-08-25 | End: 2020-08-25

## 2020-08-25 RX ORDER — AMLODIPINE BESYLATE 2.5 MG/1
2.5 TABLET ORAL DAILY
Refills: 0 | Status: DISCONTINUED | OUTPATIENT
Start: 2020-08-26 | End: 2020-08-26

## 2020-08-25 RX ORDER — TRAMADOL HYDROCHLORIDE 50 MG/1
25 TABLET ORAL EVERY 12 HOURS
Refills: 0 | Status: DISCONTINUED | OUTPATIENT
Start: 2020-08-25 | End: 2020-08-26

## 2020-08-25 RX ORDER — SODIUM BICARBONATE 1 MEQ/ML
650 SYRINGE (ML) INTRAVENOUS THREE TIMES A DAY
Refills: 0 | Status: DISCONTINUED | OUTPATIENT
Start: 2020-08-25 | End: 2020-08-26

## 2020-08-25 RX ORDER — TRAMADOL HYDROCHLORIDE 50 MG/1
25 TABLET ORAL EVERY 6 HOURS
Refills: 0 | Status: DISCONTINUED | OUTPATIENT
Start: 2020-08-25 | End: 2020-08-25

## 2020-08-25 RX ADMIN — Medication 650 MILLIGRAM(S): at 21:24

## 2020-08-25 RX ADMIN — TAMSULOSIN HYDROCHLORIDE 0.4 MILLIGRAM(S): 0.4 CAPSULE ORAL at 21:24

## 2020-08-25 RX ADMIN — CEFTRIAXONE 100 MILLIGRAM(S): 500 INJECTION, POWDER, FOR SOLUTION INTRAMUSCULAR; INTRAVENOUS at 19:25

## 2020-08-25 RX ADMIN — Medication 250 MILLIGRAM(S): at 10:34

## 2020-08-25 RX ADMIN — Medication 650 MILLIGRAM(S): at 17:13

## 2020-08-25 RX ADMIN — ATORVASTATIN CALCIUM 20 MILLIGRAM(S): 80 TABLET, FILM COATED ORAL at 21:24

## 2020-08-25 NOTE — ED ADULT NURSE NOTE - OBJECTIVE STATEMENT
Pt presents to ED c/o hematuria. Pt had L hip surgery, back from rehab on 8/23.  Hopper to OSD in place. Aide noticed bloody output mixed with urine in hopper around 5am. Unsure if any trauma happened to area. States she has not seen pt pull at hopper since arriving home. AAox2, pt drowsy. Per Home health aide, pt is at his baseline. Pt mobile with walker, hx of dementia. L 20g PIV placed, labs collected. UA and Urine culture collected. Rectal temp, 98.9. Pt denies current pain.

## 2020-08-25 NOTE — H&P ADULT - PROBLEM SELECTOR PLAN 9
Closed left femoral neck fracture s/p Hemiarthroplasty of left hip (8/19/2020) after a fall. Wound healing well. Per nurse patient has not had any increasing pain requirements. Has tramadol PRN at home, and on asa 325 BID for DVT ppx.  - tyelonol PRN for pain  - c/w asa 325 Closed left femoral neck fracture s/p Hemiarthroplasty of left hip (8/19/2020) after a fall. Wound healing well. Per nurse patient has not had any increasing pain requirements. Has tramadol PRN at home, and on asa 325 BID for DVT ppx.  -PT eval  - tyelonol PRN for pain  - c/w asa 325 Closed left femoral neck fracture s/p Hemiarthroplasty of left hip (8/19/2020) after a fall. Wound healing well. Per nurse patient has not had any increasing pain requirements. Has tramadol PRN at home, and on asa 325 BID for DVT ppx.  -PT eval  - tyelonol PRN for pain  -SCDs for DVT ppx Closed left femoral neck fracture s/p Hemiarthroplasty of left hip (8/19/2020) after a fall. Wound healing well. Per nurse patient has not had any increasing pain requirements. Has tramadol PRN at home, and on asa 325 BID for DVT ppx.  -PT eval  -tyelonol PRN for pain  -SCDs for DVT ppx  -restart ASA once patient's urine clear and no longer red Closed left femoral neck fracture s/p Hemiarthroplasty of left hip (8/19/2020) after a fall. Wound healing well. Per nurse patient has not had any increasing pain requirements. Has tramadol PRN at home, and on asa 325 BID for DVT ppx.  -PT eval  -Tramadol PRN for pain  -SCDs for DVT ppx  -restart ASA once patient's urine clear and no longer red

## 2020-08-25 NOTE — H&P ADULT - ATTENDING COMMENTS
I have personally seen, examined, and participated in the care of this patient. I have reviewed all pertinent clinical information including history, physical exam, plan, and the resident's note and agree except as noted    86 y.o. M PMHx closed left femoral neck fracture s/p Hemiarthroplasty of left hip (8/19/2020), Alzheimer's dementia, CKD V, Prostate CA, HTN, hyperlipidemia, Squamous Cell Carcinoma, presented w/ hematuria and scrotal redness, found to be anemic and admitted for further evaluation.  -Hematuria: Pt with indwelling hopper placed on 8/21 that has been draining clear urine however now with hematuria. Will hold ASA, tx for UTI FU Ucx, cw flomax. Urology consulted, will f/u scrotal US. Will continue to follow  Otherwise plan as above

## 2020-08-25 NOTE — H&P ADULT - NSICDXPASTMEDICALHX_GEN_ALL_CORE_FT
PAST MEDICAL HISTORY:  Alzheimer disease     Cancer of skin, squamous cell     Chronic kidney disease, unspecified CKD stage     HTN (hypertension)     Hyperlipidemia     Prostate CA

## 2020-08-25 NOTE — CONSULT NOTE ADULT - ASSESSMENT
85 yo male with hematuria, s/p urinary retention with hopper placed 8/21; erythema R scrotum 87 yo male with hematuria, UTI, s/p urinary retention with hopper placed 8/21; erythema R scrotum

## 2020-08-25 NOTE — H&P ADULT - PROBLEM SELECTOR PLAN 3
Acute kidney injury superimposed on CKD. Hx of CKD5 (GFR 9). Creatinine bumped from 5.22 - was 5.07 on discharge.   - Acute kidney injury superimposed on CKD. Hx of CKD5 (GFR 9). Creatinine bumped from 5.22 - was 5.07 on discharge on 8/22.   -500 cc IVF Hx of CKD5 (GFR 9). Creatinine bumped to 5.22 from 5.07 on discharge on 8/22.   - trend Cr  - avoid nephrotoxic drugs, renally dose meds  - renal consult Hx of CKD5 (GFR 9). Creatinine bumped to 5.22 from 5.07 on discharge on 8/22.   - trend Cr  - avoid nephrotoxic drugs, renally dose meds

## 2020-08-25 NOTE — H&P ADULT - PROBLEM SELECTOR PLAN 7
Hgb 8.3 on admission. May be due to hematuria) but patient chronically anemic (baseline ~10). Iron studies from 8/18 WNL. Chronic anemia likely secondary to kidney disease.  - trend CBC  - maintain active T&S  - transfuse if Hgb <7 Hgb 8.3 on admission. May be due to hematuria but patient chronically anemic (baseline ~10). Iron studies from 8/18 WNL. Chronic anemia likely secondary to kidney disease.  - trend CBC  - maintain active T&S  - transfuse if Hgb <7 Hgb 8.3 on admission. Patient chronically anemic (baseline ~10) likely secondary to kidney disease. Iron studies from 8/18 WNL. However, patient with elevated BUN and trending up, concerning for active hemolysis.   - trend CBC  - maintain active T&S  - transfuse if Hgb <7

## 2020-08-25 NOTE — ED ADULT TRIAGE NOTE - CHIEF COMPLAINT QUOTE
pt BIBA from home c/o hematuria from the Casas Cath since this morning,. pt had left sided hip sx replacement last Friday.

## 2020-08-25 NOTE — H&P ADULT - PROBLEM SELECTOR PLAN 5
HCO3 18 on admission. Also low (17-19) on prior admission.   -Nabicarb 650mg q8 HCO3 18 on admission. Also low (17-19) on prior admission.   -Nabicarb 650mg

## 2020-08-25 NOTE — H&P ADULT - NSHPSOCIALHISTORY_GEN_ALL_CORE
Lives at home with wife who is bedbound. Has 24 hour HHA. Per patient's private nurse, he has no history of smoking or alcohol use.

## 2020-08-25 NOTE — H&P ADULT - HISTORY OF PRESENT ILLNESS
86 y.o. M PMHx closed left femoral neck fracture s/p Hemiarthroplasty of left hip (8/19/2020), Alzheimer's dementia, Prostate CA, HTN, hyperlipidemia, Squamous Cell Carcinoma, CRI (baseline Cr 3.04, based off of previous charts), brought from [facility] for evaluation of hematuria noted this morning. Patient with advanced dementia and A&Ox0-1 at baseline so most history obtained from chart and aide.       ED course  T 98.3-98.9, HR 96->85, /91->151/62, RR 20 O2 sat 100%  Labs notable for Hgb/Hct 8.3/25.6, metamyelocyte 0.9, myelocyte % 1.8, HCO3 18, BUN 81, Creatnine  5.22, GFR 9  Ua: +leuk esterase, + blood, + protein 100, +WBC 5-10, +RBC >5, +granular casts, +glucose 100       Duplex u/s of lower extremities (8/25): No DVT seen  CXR pending  U/S Scrotum pending    Given vanc 1g, blood cx sent, urine cx sent. 86 y.o. M PMHx closed left femoral neck fracture s/p Hemiarthroplasty of left hip (8/19/2020), Alzheimer's dementia, Prostate CA, HTN, hyperlipidemia, Squamous Cell Carcinoma, CRI (baseline Cr 3.04, based off of previous charts), brought from home for evaluation of hematuria noted at ~5am on day of admission and scrotal redness. Patient with advanced dementia and A&Ox0 at baseline so most history obtained from chart and aide. Aide at bedside reports overnight aide noticed blood draining into hopper at around 5am this morning. Patient recently admitted for a fall w/ hip fracture and had Hopper placed on day prior to discharge for urinary retention s/p Hemiarthroplasty procedure. Was discharged home and was draining clear urine from Hopper until this morning. Patient was discharged with new prescription of asa 325 BID for post procedure DVT ppx. 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 No history of kidney stones. Aide also mentioned she first notice increased redness and white plaques on scrotum starting two days prior to admission Had been putting ointment on affected area and says there is a reduction of the redness and plaque. Unable to fully assess ROS given patient's mental status. However per aide, patient has not had fevers, chills, nausea, vomiting, diarrhea, constipation, LE edema.      ED course  T 98.3-98.9, HR 96->85, /91->151/62, RR 20 O2 sat 100%  Labs notable for Hgb/Hct 8.3/25.6, metamyelocyte 0.9, myelocyte % 1.8, HCO3 18, BUN 81, Creatnine  5.22, GFR 9  Ua: +leuk esterase, + blood, + protein 100, +WBC 5-10, +RBC >5, +granular casts, +glucose 100       Duplex u/s of lower extremities (8/25): No DVT seen  CXR pending  U/S Scrotum pending    Given vanc 1g, blood cx sent, urine cx sent. 86 y.o. M PMHx closed left femoral neck fracture s/p Hemiarthroplasty of left hip (8/19/2020), Alzheimer's dementia, Prostate CA, HTN, hyperlipidemia, Squamous Cell Carcinoma, CRI (baseline Cr 3.04, based off of previous charts), brought from home for evaluation of hematuria noted at ~5am on day of admission and scrotal redness. Patient with advanced dementia and A&Ox0 at baseline so most history obtained from chart and aide. Aide at bedside reports overnight aide noticed blood draining into hopper at around 5am this morning. Patient recently admitted for a fall w/ hip fracture and had Hopper placed on day prior to discharge for urinary retention s/p Hemiarthroplasty procedure. Was discharged home and was draining clear urine from Hopper until this morning. Patient was discharged with new prescription of asa 325 BID for post procedure DVT ppx. Per nurse, patient has no smoking history and worked corporate job as an  prior to [employer]. No history of kidney stones. Aide also mentioned she first notice increased redness and white plaques on scrotum starting two days prior to admission Had been putting ointment on affected area and says there is a reduction of the redness and plaque. Unable to fully assess ROS given patient's mental status. However per aide, patient has not had fevers, chills, nausea, vomiting, diarrhea, constipation, LE edema.      ED course  T 98.3-98.9, HR 96->85, /91->151/62, RR 20 O2 sat 100%  Labs notable for Hgb/Hct 8.3/25.6, metamyelocyte 0.9, myelocyte % 1.8, HCO3 18, BUN 81, Creatnine  5.22, GFR 9  Ua: +leuk esterase, + blood, + protein 100, +WBC 5-10, +RBC >5, +granular casts, +glucose 100       Duplex u/s of lower extremities (8/25): No DVT seen  CXR pending  U/S Scrotum: No hydrocele. No evidence of epididymo-orchitis. No evidence of testicular torsion.    Given vanc 1g, blood cx sent, urine cx sent. 86 y.o. M PMHx closed left femoral neck fracture s/p Hemiarthroplasty of left hip (8/19/2020), Alzheimer's dementia, Prostate CA, HTN, hyperlipidemia, Squamous Cell Carcinoma, CRI (baseline Cr 3.04, based off of previous charts), brought from home for evaluation of hematuria noted at ~5am on day of admission and scrotal redness. Patient with advanced dementia and A&Ox0 at baseline so most history obtained from chart and aide.     Aide at bedside reports overnight aide noticed blood draining into hopper at around 5am this morning. Patient recently admitted for a fall w/ hip fracture and had Hopper placed on day prior to discharge for urinary retention s/p Hemiarthroplasty procedure. Was discharged home on 8/22 and was draining clear urine from Hopper until morning of admission. Patient was discharged with new prescription of asa 325 BID for post procedure DVT ppx. 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 No history of kidney stones. Aide also mentioned she first notice increased redness and white plaques on scrotum starting two days prior to admission. Had been putting ointment on affected area and says there is a reduction of the redness and plaque. Unable to fully assess ROS given patient's mental status. However per aide, patient has not had fevers, chills, nausea, vomiting, diarrhea, constipation, LE edema.      ED course  T 98.3-98.9, HR 96->85, /91->151/62, RR 20 O2 sat 100%  Labs notable for Hgb/Hct 8.3/25.6, metamyelocyte 0.9, myelocyte % 1.8, HCO3 18, BUN 81, Creatnine  5.22, GFR 9  Ua: +leuk esterase, + blood, + protein 100, +WBC 5-10, +RBC >5, +granular casts, +glucose 100     Duplex u/s of lower extremities (8/25): No DVT seen  CXR (8/25): pending  U/S Scrotum (8/25): No hydrocele. No evidence of epididymo-orchitis. No evidence of testicular torsion.    Given vanc 1g, blood cx sent, urine cx sent. 86 y.o. M PMHx closed left femoral neck fracture s/p Hemiarthroplasty of left hip (8/19/2020), CKD V, Alzheimer's dementia, Prostate CA, HTN, hyperlipidemia, Squamous Cell Carcinoma, CRI (baseline Cr 3.04, based off of previous charts), brought from home for evaluation of hematuria noted at ~5am on day of admission and scrotal redness. Patient with advanced dementia and A&Ox0 at baseline so most history obtained from chart and aide.     Aide at bedside reports overnight aide noticed blood draining into hopper at around 5am this morning. Patient recently admitted for a fall w/ hip fracture and had Hopper placed on day prior to discharge for urinary retention s/p Hemiarthroplasty procedure. Was discharged home on 8/22 and was draining clear urine from hopper until morning of admission. Patient was discharged with new prescription of asa 325 BID for post procedure DVT ppx. 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 No history of kidney stones. Aide also mentioned she first notice increased redness and white plaques on scrotum starting two days prior to admission. Had been putting ointment on affected area and says there is a reduction of the redness and plaque. Unable to fully assess ROS given patient's mental status. However per aide, patient has not had fevers, chills, nausea, vomiting, diarrhea, constipation, LE edema.      ED course  T 98.3-98.9, HR 96->85, /91->151/62, RR 20 O2 sat 100%  Labs notable for Hgb/Hct 8.3/25.6, metamyelocyte 0.9, myelocyte % 1.8, HCO3 18, BUN 81, Creatnine  5.22, GFR 9  Ua: +leuk esterase, + blood, + protein 100, +WBC 5-10, +RBC >5, +granular casts, +glucose 100     Duplex u/s of lower extremities (8/25): No DVT seen  CXR (8/25): pending  U/S Scrotum (8/25): No hydrocele. No evidence of epididymo-orchitis. No evidence of testicular torsion.    Given vanc 1g, blood cx sent, urine cx sent. Urology consulted.

## 2020-08-25 NOTE — CONSULT NOTE ADULT - SUBJECTIVE AND OBJECTIVE BOX
HPI: 85 yo male s/p L hip replacement . Patient d/c'd home . Patient has home health aid. Patient had hopper placed by urology on  for difficult hopper placement. Patient sent to ER for evaluation of hematuria noted in hopper bag. Patient poor historian secondary to Alzheimers. Patient also noted to have redness R scrotum. Called to evaluate.       PAST MEDICAL & SURGICAL HISTORY:  Chronic kidney disease, unspecified CKD stage  Cancer of skin, squamous cell  Prostate CA  Alzheimer disease  Hyperlipidemia  HTN (hypertension)  H/O prostate cancer      MEDICATIONS  (STANDING):    MEDICATIONS  (PRN):      Allergies    penicillins (Rash)    Intolerances        SOCIAL HISTORY:    FAMILY HISTORY:      Vital Signs Last 24 Hrs  T(C): 37.2 (25 Aug 2020 09:40), Max: 37.2 (25 Aug 2020 09:40)  T(F): 98.9 (25 Aug 2020 09:40), Max: 98.9 (25 Aug 2020 09:40)  HR: 96 (25 Aug 2020 09:12) (96 - 96)  BP: 168/81 (25 Aug 2020 09:12) (168/81 - 168/81)  BP(mean): --  RR: 20 (25 Aug 2020 09:12) (20 - 20)  SpO2: 100% (25 Aug 2020 09:12) (100% - 100%)    On PE:  General: alert and awake  Abdomen: soft, NT, ND  : FC #20fr intact, urine pinkish      LABS:                        8.3    10.44 )-----------( 236      ( 25 Aug 2020 09:39 )             25.6     08-    143  |  108  |  81<H>  ----------------------------<  169<H>  4.8   |  18<L>  |  5.22<H>    Ca    9.6      25 Aug 2020 09:39    TPro  7.1  /  Alb  3.7  /  TBili  0.4  /  DBili  x   /  AST  36  /  ALT  11  /  AlkPhos  109  08-25    PT/INR - ( 25 Aug 2020 09:39 )   PT: 12.5 sec;   INR: 1.04          PTT - ( 25 Aug 2020 09:39 )  PTT:28.8 sec  Urinalysis Basic - ( 25 Aug 2020 09:39 )    Color: Red / Appearance: Cloudy / S.020 / pH: x  Gluc: x / Ketone: NEGATIVE  / Bili: Negative / Urobili: 1.0 E.U./dL   Blood: x / Protein: 100 mg/dL / Nitrite: NEGATIVE   Leuk Esterase: Small / RBC: See Note /HPF / WBC 5-10 /HPF   Sq Epi: x / Non Sq Epi: 0-5 /HPF / Bacteria: Present /HPF        RADIOLOGY & ADDITIONAL STUDIES: HPI: 87 yo male s/p L hip replacement . Patient d/c'd home . Patient has home health aid. Patient had hopper placed by urology on  for difficult hopper placement. Patient sent to ER for evaluation of hematuria noted in hopper bag. Patient poor historian secondary to Alzheimers. Patient with CKD 5.  Patient also noted to have redness R scrotum. Called to evaluate.       PAST MEDICAL & SURGICAL HISTORY:  Chronic kidney disease, unspecified CKD stage  Cancer of skin, squamous cell  Prostate CA  Alzheimer disease  Hyperlipidemia  HTN (hypertension)  H/O prostate cancer      MEDICATIONS  (STANDING):    MEDICATIONS  (PRN):      Allergies    penicillins (Rash)    Intolerances        SOCIAL HISTORY:    FAMILY HISTORY:      Vital Signs Last 24 Hrs  T(C): 37.2 (25 Aug 2020 09:40), Max: 37.2 (25 Aug 2020 09:40)  T(F): 98.9 (25 Aug 2020 09:40), Max: 98.9 (25 Aug 2020 09:40)  HR: 96 (25 Aug 2020 09:12) (96 - 96)  BP: 168/81 (25 Aug 2020 09:12) (168/81 - 168/81)  BP(mean): --  RR: 20 (25 Aug 2020 09:12) (20 - 20)  SpO2: 100% (25 Aug 2020 09:12) (100% - 100%)    On PE:  General: alert and awake  Abdomen: soft, NT, ND  : FC #20fr intact, urine pinkish      LABS:                        8.3    10.44 )-----------( 236      ( 25 Aug 2020 09:39 )             25.6     08-25    143  |  108  |  81<H>  ----------------------------<  169<H>  4.8   |  18<L>  |  5.22<H>    Ca    9.6      25 Aug 2020 09:39    TPro  7.1  /  Alb  3.7  /  TBili  0.4  /  DBili  x   /  AST  36  /  ALT  11  /  AlkPhos  109  08-25    PT/INR - ( 25 Aug 2020 09:39 )   PT: 12.5 sec;   INR: 1.04          PTT - ( 25 Aug 2020 09:39 )  PTT:28.8 sec  Urinalysis Basic - ( 25 Aug 2020 09:39 )    Color: Red / Appearance: Cloudy / S.020 / pH: x  Gluc: x / Ketone: NEGATIVE  / Bili: Negative / Urobili: 1.0 E.U./dL   Blood: x / Protein: 100 mg/dL / Nitrite: NEGATIVE   Leuk Esterase: Small / RBC: See Note /HPF / WBC 5-10 /HPF   Sq Epi: x / Non Sq Epi: 0-5 /HPF / Bacteria: Present /HPF        RADIOLOGY & ADDITIONAL STUDIES: HPI: 87 yo male s/p L hip replacement . Patient d/c'd home . Patient has home health aid. Patient had hopper placed by urology on  for difficult hopper placement. Patient sent to ER for evaluation of hematuria noted in hopper bag. Patient poor historian secondary to Alzheimers. Patient with CKD 5.  Patient also noted to have redness R scrotum. Called to evaluate.       PAST MEDICAL & SURGICAL HISTORY:  Chronic kidney disease, unspecified CKD stage  Cancer of skin, squamous cell  Prostate CA  Alzheimer disease  Hyperlipidemia  HTN (hypertension)  H/O prostate cancer      MEDICATIONS  (STANDING):    MEDICATIONS  (PRN):      Allergies    penicillins (Rash)    Intolerances        SOCIAL HISTORY:    FAMILY HISTORY:      Vital Signs Last 24 Hrs  T(C): 37.2 (25 Aug 2020 09:40), Max: 37.2 (25 Aug 2020 09:40)  T(F): 98.9 (25 Aug 2020 09:40), Max: 98.9 (25 Aug 2020 09:40)  HR: 96 (25 Aug 2020 09:12) (96 - 96)  BP: 168/81 (25 Aug 2020 09:12) (168/81 - 168/81)  BP(mean): --  RR: 20 (25 Aug 2020 09:12) (20 - 20)  SpO2: 100% (25 Aug 2020 09:12) (100% - 100%)    On PE:  General: alert and awake  Abdomen: soft, NT, ND  : FC #20fr intact, urine pinkish, bilat testes descended, NT. Mild erythema R scrotum, minimal swelling. +excoriation skin mid scrotum.      LABS:                        8.3    10.44 )-----------( 236      ( 25 Aug 2020 09:39 )             25.6     08-25    143  |  108  |  81<H>  ----------------------------<  169<H>  4.8   |  18<L>  |  5.22<H>    Ca    9.6      25 Aug 2020 09:39    TPro  7.1  /  Alb  3.7  /  TBili  0.4  /  DBili  x   /  AST  36  /  ALT  11  /  AlkPhos  109  08-25    PT/INR - ( 25 Aug 2020 09:39 )   PT: 12.5 sec;   INR: 1.04          PTT - ( 25 Aug 2020 09:39 )  PTT:28.8 sec  Urinalysis Basic - ( 25 Aug 2020 09:39 )    Color: Red / Appearance: Cloudy / S.020 / pH: x  Gluc: x / Ketone: NEGATIVE  / Bili: Negative / Urobili: 1.0 E.U./dL   Blood: x / Protein: 100 mg/dL / Nitrite: NEGATIVE   Leuk Esterase: Small / RBC: See Note /HPF / WBC 5-10 /HPF   Sq Epi: x / Non Sq Epi: 0-5 /HPF / Bacteria: Present /HPF        RADIOLOGY & ADDITIONAL STUDIES: HPI: 85 yo male s/p L hip replacement . Patient d/c'd home . Patient has home health aid. Patient had hopper placed by urology on  for difficult hopper placement. Patient sent to ER for evaluation of hematuria noted in hopper bag. Patient poor historian secondary to Alzheimers. Patient with CKD 5.  Patient also noted to have redness R scrotum. Called to evaluate. ? h/o smoking in past.      PAST MEDICAL & SURGICAL HISTORY:  Chronic kidney disease, unspecified CKD stage  Cancer of skin, squamous cell  Prostate CA  Alzheimer disease  Hyperlipidemia  HTN (hypertension)  H/O prostate cancer      MEDICATIONS  (STANDING):    MEDICATIONS  (PRN):      Allergies    penicillins (Rash)    Intolerances        SOCIAL HISTORY:    FAMILY HISTORY:      Vital Signs Last 24 Hrs  T(C): 37.2 (25 Aug 2020 09:40), Max: 37.2 (25 Aug 2020 09:40)  T(F): 98.9 (25 Aug 2020 09:40), Max: 98.9 (25 Aug 2020 09:40)  HR: 96 (25 Aug 2020 09:12) (96 - 96)  BP: 168/81 (25 Aug 2020 09:12) (168/81 - 168/81)  BP(mean): --  RR: 20 (25 Aug 2020 09:12) (20 - 20)  SpO2: 100% (25 Aug 2020 09:12) (100% - 100%)    On PE:  General: alert and awake  Abdomen: soft, NT, ND  : FC #20fr intact, urine pinkish, bilat testes descended, NT. Mild erythema R scrotum, minimal swelling. +excoriation skin mid scrotum.      LABS:                        8.3    10.44 )-----------( 236      ( 25 Aug 2020 09:39 )             25.6     08-25    143  |  108  |  81<H>  ----------------------------<  169<H>  4.8   |  18<L>  |  5.22<H>    Ca    9.6      25 Aug 2020 09:39    TPro  7.1  /  Alb  3.7  /  TBili  0.4  /  DBili  x   /  AST  36  /  ALT  11  /  AlkPhos  109  08-25    PT/INR - ( 25 Aug 2020 09:39 )   PT: 12.5 sec;   INR: 1.04          PTT - ( 25 Aug 2020 09:39 )  PTT:28.8 sec  Urinalysis Basic - ( 25 Aug 2020 09:39 )    Color: Red / Appearance: Cloudy / S.020 / pH: x  Gluc: x / Ketone: NEGATIVE  / Bili: Negative / Urobili: 1.0 E.U./dL   Blood: x / Protein: 100 mg/dL / Nitrite: NEGATIVE   Leuk Esterase: Small / RBC: See Note /HPF / WBC 5-10 /HPF   Sq Epi: x / Non Sq Epi: 0-5 /HPF / Bacteria: Present /HPF        RADIOLOGY & ADDITIONAL STUDIES:

## 2020-08-25 NOTE — H&P ADULT - PROBLEM SELECTOR PLAN 10
F: tolerating PO, no IVF  E: replete K<4, Mg<2  N: Dash/TLC    VTE Prophylaxis: Lovenox 40 Q24H  GI: not needed  C: Full Code  D: RMF F: tolerating PO, no IVF  E: replete K<4, Mg<2  N: Dash/TLC    VTE Prophylaxis: SCDs  GI: not needed  C: Full Code  D: RMF

## 2020-08-25 NOTE — H&P ADULT - NSHPPHYSICALEXAM_GEN_ALL_CORE
PHYSICAL EXAM:  GENERAL: NAD, speaks in full sentences, no signs of respiratory distress  HEAD:  Atraumatic, Normocephalic  EYES: EOMI, PERRLA, conjunctiva and sclera clear  NECK: Supple, No JVD  CHEST/LUNG: Clear to auscultation bilaterally; No wheeze; No crackles; No accessory muscles used  HEART: Regular rate and rhythm; No murmurs;   ABDOMEN: Soft, Nontender, Nondistended; Bowel sounds present; No guarding  EXTREMITIES:  2+ Peripheral Pulses, No cyanosis or edema  PSYCH: AAOx0  NEUROLOGY: non-focal  : scrotum erythematous w/ white plaques  SKIN: multiple scaly moles  Drain: Casas draining red urine PHYSICAL EXAM:  GENERAL: NAD, speaks in full sentences, no signs of respiratory distress  HEAD:  Atraumatic, Normocephalic  EYES: EOMI, PERRLA, conjunctiva and sclera clear  NECK: Supple, No JVD  CHEST/LUNG: Clear to auscultation bilaterally; No wheeze; No crackles; No accessory muscles used  HEART: Regular rate and rhythm; No murmurs;   ABDOMEN: Soft, Nontender, Nondistended; Bowel sounds present; No guarding  EXTREMITIES:  2+ Peripheral Pulses, No cyanosis or edema  PSYCH: AAOx0  NEUROLOGY: non-focal  : scrotum erythematous w/ white plaques  SKIN: multiple brown and patchy scabs on skin  Drain: Casas draining red urine PHYSICAL EXAM:  GENERAL: NAD, speaks in full sentences, no signs of respiratory distress  HEAD:  Atraumatic, Normocephalic  EYES: EOMI, PERRLA, conjunctiva and sclera clear  NECK: Supple, No JVD  CHEST/LUNG: Clear to auscultation bilaterally; No wheeze; No crackles; No accessory muscles used  HEART: Regular rate and rhythm; No murmurs;   ABDOMEN: Soft, Nontender, Nondistended; Bowel sounds present; No guarding  EXTREMITIES:  2+ Peripheral Pulses, No cyanosis or edema  PSYCH: AAOx0  NEUROLOGY: non-focal  : scrotum erythematous w/ white plaques  SKIN: multiple brown and patchy scabs on skin; left hip surgical wound dry  Drain: Casas draining red urine PHYSICAL EXAM:  GENERAL: NAD, speaks in full sentences, no signs of respiratory distress  HEAD:  Atraumatic, Normocephalic  EYES: EOMI, PERRLA, conjunctiva and sclera clear  NECK: Supple, No JVD  CHEST/LUNG: Clear to auscultation bilaterally; No wheeze; No crackles; No accessory muscles used  HEART: Regular rate and rhythm; No murmurs;   ABDOMEN: Soft, Nontender, Nondistended; Bowel sounds present; No guarding; No CVA tendernes  EXTREMITIES:  2+ Peripheral Pulses, No cyanosis or edema  PSYCH: AAOx0  NEUROLOGY: non-focal  : scrotum erythematous w/ white plaques, non tender  SKIN: multiple brown and patchy scabs on skin; left hip surgical wound dry and clean  Drain: Casas draining red urine

## 2020-08-25 NOTE — ED PROVIDER NOTE - OBJECTIVE STATEMENT
85 yo male  hx HTN HLD CKD sent to ED from [facility] for eval of hematuria noted this am   had left hip fx repaired 1 week ago at [facility]  is taking ASA - no fevers or chills noted left scrotal erythema and left leg erythema per NH - no hx of falls or trauma

## 2020-08-25 NOTE — H&P ADULT - NSHPLABSRESULTS_GEN_ALL_CORE
LABS:                        8.3    10.44 )-----------( 236      ( 25 Aug 2020 09:39 )             25.6     08-    143  |  108  |  81<H>  ----------------------------<  169<H>  4.8   |  18<L>  |  5.22<H>    Ca    9.6      25 Aug 2020 09:39    TPro  7.1  /  Alb  3.7  /  TBili  0.4  /  DBili  x   /  AST  36  /  ALT  11  /  AlkPhos  109        PT/INR - ( 25 Aug 2020 09:39 )   PT: 12.5 sec;   INR: 1.04          PTT - ( 25 Aug 2020 09:39 )  PTT:28.8 sec  CAPILLARY BLOOD GLUCOSE                  Urinalysis Basic - ( 25 Aug 2020 09:39 )    Color: Red / Appearance: Cloudy / S.020 / pH: x  Gluc: x / Ketone: NEGATIVE  / Bili: Negative / Urobili: 1.0 E.U./dL   Blood: x / Protein: 100 mg/dL / Nitrite: NEGATIVE   Leuk Esterase: Small / RBC: See Note /HPF / WBC 5-10 /HPF   Sq Epi: x / Non Sq Epi: 0-5 /HPF / Bacteria: Present /HPF          LIVER FUNCTIONS - ( 25 Aug 2020 09:39 )  Alb: 3.7 g/dL / Pro: 7.1 g/dL / ALK PHOS: 109 U/L / ALT: 11 U/L / AST: 36 U/L / GGT: x                     I & O Summary:      Microbiology:        RADIOLOGY, EKG AND ADDITIONAL TESTS: Reviewed.

## 2020-08-25 NOTE — H&P ADULT - PROBLEM SELECTOR PLAN 4
During prior hospitalization (8/) for L hip fracture, patient had hopper placed by urology  (8/21) for difficult hopper placement. Evaluated by urology in the ED on this admission. Per urology, hopper flushing well. Continue hopper until patient more mobile.  -f/u Ucx  -start flomax  -possible TOV when more OOB During prior hospitalization (8/) for L hip fracture, patient had hopper placed by urology  (8/21) for difficult Hopper placement. Evaluated by urology in the ED on this admission. Per urology, Hopper flushing well. Continue Hopper until patient more mobile.  -f/u Ucx  -start flomax  -possible TOV when more OOB During prior hospitalization (8/) for L hip fracture, patient had hopper placed by urology  (8/21) for difficult Hopper placement. Evaluated by urology in the ED on this admission. Per urology, Hopper flushing well. Continue Hopper until patient more mobile.  -f/u Ucx  -start flomax  -possible TOV when more OOB    #scrotal redness  Per aide patient has had redness since Sunday. Srotum erythematous, moist with few white plaques on exam. Non tender on exam. Likely fungal.  -keep area dry  -apply topical ointment During prior hospitalization (8/18-8/22) for L hip fracture, patient had hopper placed by urology  (8/21) for difficult hopper placement. Evaluated by urology in the ED on this admission. Per urology, hopper flushing well and recommends to continue hopper until patient more mobile.  -f/u Ucx  -start flomax  -possible TOV when more OOB    #scrotal redness  Per aide patient has had redness since Sunday. Srotum erythematous, moist with few white plaques on exam. Non tender on exam. Likely fungal.  -keep area dry  -apply topical ointment

## 2020-08-25 NOTE — H&P ADULT - PROBLEM SELECTOR PLAN 1
Patient presents w/ hematuria starting on morning of admission. Has had indwelling hopper catheter and Patient presents w/ hematuria starting on morning of admission. Has had indwelling hopper catheter since 8/21 which has been draining clear urine until today. Abdomen and suprapbic region non tender. Patient may have been trying to pull hopper out but unable to ascertain given mental status. 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 No history of kidney stones per aide, and patient lying comfortable in bed with no signs of current obstructing kidney stone. Patient started on asa 325 BID on 8/19 and this may be contributing to hematuria.  -trend CBC  -f/u u/s  - u/s abd pelvis Patient presents w/ hematuria starting on morning of admission. Has had indwelling hopper catheter since 8/21 which has been draining clear urine until today. Abdomen and suprapubic region non tender. Patient may have been trying to pull hopper out but unable to ascertain given mental status. 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 No history of kidney stones per aide, and patient lying comfortable in bed with no signs of current obstructing kidney stone. Patient started on asa 325 BID on 8/19 and this may be contributing to hematuria.  -hold ASA  -trend CBC  -f/u u/s  -u/s abd pelvis Patient presents w/ hematuria starting on morning of admission. Has had indwelling hopper catheter since 8/21 which has been draining clear urine until today. Abdomen and suprapubic region non tender. Patient may have been trying to pull hopper out but unable to ascertain given mental status. 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 No history of kidney stones per aide, and patient lying comfortable in bed with no signs of current obstructing kidney stone. Patient started on asa 325 BID on 8/19 and this may be contributing to hematuria.  -hold ASA  -trend CBC  -u/s abd pelvis Patient presents w/ hematuria starting on morning of admission. Has had indwelling hopper catheter since 8/21 which has been draining clear urine until today. Abdomen and suprapubic region non tender. Patient may have been trying to pull hopper out but unable to ascertain given mental status. 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 No history of kidney stones per aide, and patient lying comfortable in bed with no signs of current obstructing kidney stone. Patient started on asa 325 BID on 8/19 and this may be contributing to hematuria.  -hold ASA  -trend CBC  -f/u u/s abd pelvis Patient presents w/ hematuria starting on morning of admission. Has had indwelling hopper catheter since 8/21 which has been draining clear urine until day of admission. Abdomen and suprapubic region non tender. Ddx includes trauma, malignancy, kidney stones, asa use or UTI. Patient may have been trying to pull hopper out but unable to ascertain given mental status. 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 No history of kidney stones per aide, and patient lying comfortable in bed with no signs of current obstructing kidney stone. Patient started on asa 325 BID on 8/19 and this may be contributing to hematuria. Also now with UTI which may cause hematuria.  -hold ASA for now  -trend CBC  -montor urine for clearance of blood/redness- if it clears, can restart ASA

## 2020-08-25 NOTE — H&P ADULT - PROBLEM SELECTOR PLAN 2
Ua concerning for UTI: +leuk esterase, +WBC 5-10, + bacteria. Patient afebrile on admission. UTI likely 2/2 to indwelling hopper catheter since 8/21. Urology consulted, saw patient in ED. s/p vanc 1g in the ED.  -f/u urine cx   - Ua concerning for UTI: +leuk esterase, +WBC 5-10, + bacteria. Patient afebrile on admission. UTI likely 2/2 to indwelling hopper catheter since 8/21. Urology consulted, saw patient in ED. s/p vanc 1g in the ED.  -start ceftriaxone 1g Q 24  -f/u urine cx   -f/u blood cx Ua concerning for UTI: +leuk esterase, +WBC 5-10, + bacteria. Patient afebrile on admission. UTI likely 2/2 to indwelling hopper catheter since 8/21. Urology consulted, saw patient in ED. s/p vanc 1g in the ED.  -c/w ceftriaxone 1g Q 24  -f/u urine cx   -f/u blood cx

## 2020-08-25 NOTE — CONSULT NOTE ADULT - PROBLEM SELECTOR RECOMMENDATION 9
-stable  -possible UTI  -f/u Ucx  -hopper flushing well. Continue hopper  -start flomax  -possible TOV when more OOB   -continue present care -stable  -UTI  -f/u Ucx  -hopper flushing well. Continue hopper  -start flomax  -possible TOV when more OOB   -f/u scrotal US  -continue present care

## 2020-08-25 NOTE — H&P ADULT - ASSESSMENT
86 y.o. M PMHx closed left femoral neck fracture s/p Hemiarthroplasty of left hip (8/19/2020), Alzheimer's dementia, Prostate CA, HTN, hyperlipidemia, Squamous Cell Carcinoma, CRI (baseline Cr 3.04, based off of previous charts), presented w/ hematuria and scrotal redness, found to be anemic and with SHARON admitted for further evaluation. 86 y.o. M PMHx closed left femoral neck fracture s/p Hemiarthroplasty of left hip (8/19/2020), Alzheimer's dementia, Prostate CA, HTN, hyperlipidemia, Squamous Cell Carcinoma, CRI (baseline Cr 3.04, based off of previous charts), presented w/ hematuria and scrotal redness, found to be anemic and admitted for further evaluation. 86 y.o. M PMHx closed left femoral neck fracture s/p Hemiarthroplasty of left hip (8/19/2020), Alzheimer's dementia, CKD V, Prostate CA, HTN, hyperlipidemia, Squamous Cell Carcinoma, CRI (baseline Cr 3.04, based off of previous charts), presented w/ hematuria and scrotal redness, found to be anemic and admitted for further evaluation.

## 2020-08-25 NOTE — H&P ADULT - PROBLEM SELECTOR PLAN 6
Per aide patient appears to be at baseline mental status (A&Ox1). On memantine at home.  - Continue monitor for change in mental status Per aide patient appears to be at baseline mental status (A&Ox1). On memantine 7 at home.  - Continue monitor for change in mental status  -c/w memantine

## 2020-08-26 ENCOUNTER — TRANSCRIPTION ENCOUNTER (OUTPATIENT)
Age: 85
End: 2020-08-26

## 2020-08-26 VITALS
HEART RATE: 86 BPM | RESPIRATION RATE: 18 BRPM | OXYGEN SATURATION: 97 % | SYSTOLIC BLOOD PRESSURE: 122 MMHG | TEMPERATURE: 98 F | DIASTOLIC BLOOD PRESSURE: 66 MMHG

## 2020-08-26 DIAGNOSIS — R33.9 RETENTION OF URINE, UNSPECIFIED: ICD-10-CM

## 2020-08-26 DIAGNOSIS — Z88.0 ALLERGY STATUS TO PENICILLIN: ICD-10-CM

## 2020-08-26 DIAGNOSIS — E78.5 HYPERLIPIDEMIA, UNSPECIFIED: ICD-10-CM

## 2020-08-26 DIAGNOSIS — Z85.46 PERSONAL HISTORY OF MALIGNANT NEOPLASM OF PROSTATE: ICD-10-CM

## 2020-08-26 DIAGNOSIS — I95.1 ORTHOSTATIC HYPOTENSION: ICD-10-CM

## 2020-08-26 DIAGNOSIS — F02.80 DEMENTIA IN OTHER DISEASES CLASSIFIED ELSEWHERE, UNSPECIFIED SEVERITY, WITHOUT BEHAVIORAL DISTURBANCE, PSYCHOTIC DISTURBANCE, MOOD DISTURBANCE, AND ANXIETY: ICD-10-CM

## 2020-08-26 DIAGNOSIS — I12.0 HYPERTENSIVE CHRONIC KIDNEY DISEASE WITH STAGE 5 CHRONIC KIDNEY DISEASE OR END STAGE RENAL DISEASE: ICD-10-CM

## 2020-08-26 DIAGNOSIS — D63.1 ANEMIA IN CHRONIC KIDNEY DISEASE: ICD-10-CM

## 2020-08-26 DIAGNOSIS — S72.002A FRACTURE OF UNSPECIFIED PART OF NECK OF LEFT FEMUR, INITIAL ENCOUNTER FOR CLOSED FRACTURE: ICD-10-CM

## 2020-08-26 DIAGNOSIS — E87.2 ACIDOSIS: ICD-10-CM

## 2020-08-26 DIAGNOSIS — G30.9 ALZHEIMER'S DISEASE, UNSPECIFIED: ICD-10-CM

## 2020-08-26 DIAGNOSIS — N17.9 ACUTE KIDNEY FAILURE, UNSPECIFIED: ICD-10-CM

## 2020-08-26 DIAGNOSIS — N18.5 CHRONIC KIDNEY DISEASE, STAGE 5: ICD-10-CM

## 2020-08-26 LAB
ALBUMIN SERPL ELPH-MCNC: 2.9 G/DL — LOW (ref 3.3–5)
ALP SERPL-CCNC: 90 U/L — SIGNIFICANT CHANGE UP (ref 40–120)
ALT FLD-CCNC: 10 U/L — SIGNIFICANT CHANGE UP (ref 10–45)
ANION GAP SERPL CALC-SCNC: 15 MMOL/L — SIGNIFICANT CHANGE UP (ref 5–17)
AST SERPL-CCNC: 29 U/L — SIGNIFICANT CHANGE UP (ref 10–40)
BASOPHILS # BLD AUTO: 0.03 K/UL — SIGNIFICANT CHANGE UP (ref 0–0.2)
BASOPHILS NFR BLD AUTO: 0.4 % — SIGNIFICANT CHANGE UP (ref 0–2)
BILIRUB SERPL-MCNC: 0.4 MG/DL — SIGNIFICANT CHANGE UP (ref 0.2–1.2)
BUN SERPL-MCNC: 85 MG/DL — HIGH (ref 7–23)
CALCIUM SERPL-MCNC: 9.3 MG/DL — SIGNIFICANT CHANGE UP (ref 8.4–10.5)
CHLORIDE SERPL-SCNC: 105 MMOL/L — SIGNIFICANT CHANGE UP (ref 96–108)
CO2 SERPL-SCNC: 18 MMOL/L — LOW (ref 22–31)
CREAT SERPL-MCNC: 4.95 MG/DL — HIGH (ref 0.5–1.3)
EOSINOPHIL # BLD AUTO: 0.27 K/UL — SIGNIFICANT CHANGE UP (ref 0–0.5)
EOSINOPHIL NFR BLD AUTO: 3.2 % — SIGNIFICANT CHANGE UP (ref 0–6)
GLUCOSE SERPL-MCNC: 129 MG/DL — HIGH (ref 70–99)
HCT VFR BLD CALC: 24.3 % — LOW (ref 39–50)
HGB BLD-MCNC: 7.7 G/DL — LOW (ref 13–17)
IMM GRANULOCYTES NFR BLD AUTO: 6.3 % — HIGH (ref 0–1.5)
LYMPHOCYTES # BLD AUTO: 0.92 K/UL — LOW (ref 1–3.3)
LYMPHOCYTES # BLD AUTO: 10.9 % — LOW (ref 13–44)
MAGNESIUM SERPL-MCNC: 2 MG/DL — SIGNIFICANT CHANGE UP (ref 1.6–2.6)
MCHC RBC-ENTMCNC: 31.6 PG — SIGNIFICANT CHANGE UP (ref 27–34)
MCHC RBC-ENTMCNC: 31.7 GM/DL — LOW (ref 32–36)
MCV RBC AUTO: 99.6 FL — SIGNIFICANT CHANGE UP (ref 80–100)
MONOCYTES # BLD AUTO: 0.67 K/UL — SIGNIFICANT CHANGE UP (ref 0–0.9)
MONOCYTES NFR BLD AUTO: 8 % — SIGNIFICANT CHANGE UP (ref 2–14)
NEUTROPHILS # BLD AUTO: 5.99 K/UL — SIGNIFICANT CHANGE UP (ref 1.8–7.4)
NEUTROPHILS NFR BLD AUTO: 71.2 % — SIGNIFICANT CHANGE UP (ref 43–77)
NRBC # BLD: 0 /100 WBCS — SIGNIFICANT CHANGE UP (ref 0–0)
PHOSPHATE SERPL-MCNC: 3.8 MG/DL — SIGNIFICANT CHANGE UP (ref 2.5–4.5)
PLATELET # BLD AUTO: 241 K/UL — SIGNIFICANT CHANGE UP (ref 150–400)
POTASSIUM SERPL-MCNC: 3.8 MMOL/L — SIGNIFICANT CHANGE UP (ref 3.5–5.3)
POTASSIUM SERPL-SCNC: 3.8 MMOL/L — SIGNIFICANT CHANGE UP (ref 3.5–5.3)
PROT SERPL-MCNC: 5.9 G/DL — LOW (ref 6–8.3)
RBC # BLD: 2.44 M/UL — LOW (ref 4.2–5.8)
RBC # FLD: 13.2 % — SIGNIFICANT CHANGE UP (ref 10.3–14.5)
SODIUM SERPL-SCNC: 138 MMOL/L — SIGNIFICANT CHANGE UP (ref 135–145)
TSH SERPL-MCNC: 1.91 UIU/ML — SIGNIFICANT CHANGE UP (ref 0.35–4.94)
WBC # BLD: 8.41 K/UL — SIGNIFICANT CHANGE UP (ref 3.8–10.5)
WBC # FLD AUTO: 8.41 K/UL — SIGNIFICANT CHANGE UP (ref 3.8–10.5)

## 2020-08-26 PROCEDURE — 99239 HOSP IP/OBS DSCHRG MGMT >30: CPT | Mod: GC

## 2020-08-26 RX ORDER — ASPIRIN/CALCIUM CARB/MAGNESIUM 324 MG
325 TABLET ORAL
Refills: 0 | Status: DISCONTINUED | OUTPATIENT
Start: 2020-08-26 | End: 2020-08-26

## 2020-08-26 RX ORDER — SODIUM BICARBONATE 1 MEQ/ML
1 SYRINGE (ML) INTRAVENOUS
Qty: 90 | Refills: 0
Start: 2020-08-26 | End: 2020-09-24

## 2020-08-26 RX ORDER — AMLODIPINE BESYLATE 2.5 MG/1
2.5 TABLET ORAL EVERY 24 HOURS
Refills: 0 | Status: DISCONTINUED | OUTPATIENT
Start: 2020-08-26 | End: 2020-08-26

## 2020-08-26 RX ORDER — PANTOPRAZOLE SODIUM 20 MG/1
1 TABLET, DELAYED RELEASE ORAL
Qty: 30 | Refills: 0
Start: 2020-08-26 | End: 2020-09-24

## 2020-08-26 RX ORDER — HALOPERIDOL DECANOATE 100 MG/ML
5 INJECTION INTRAMUSCULAR ONCE
Refills: 0 | Status: COMPLETED | OUTPATIENT
Start: 2020-08-26 | End: 2020-08-26

## 2020-08-26 RX ORDER — CEFPODOXIME PROXETIL 100 MG
1 TABLET ORAL
Qty: 14 | Refills: 0
Start: 2020-08-26 | End: 2020-09-01

## 2020-08-26 RX ORDER — TAMSULOSIN HYDROCHLORIDE 0.4 MG/1
1 CAPSULE ORAL
Qty: 30 | Refills: 0
Start: 2020-08-26 | End: 2020-09-24

## 2020-08-26 RX ORDER — LANOLIN ALCOHOL/MO/W.PET/CERES
5 CREAM (GRAM) TOPICAL ONCE
Refills: 0 | Status: COMPLETED | OUTPATIENT
Start: 2020-08-26 | End: 2020-08-26

## 2020-08-26 RX ADMIN — Medication 325 MILLIGRAM(S): at 18:45

## 2020-08-26 RX ADMIN — TRAMADOL HYDROCHLORIDE 25 MILLIGRAM(S): 50 TABLET ORAL at 02:37

## 2020-08-26 RX ADMIN — Medication 1 APPLICATION(S): at 06:32

## 2020-08-26 RX ADMIN — AMLODIPINE BESYLATE 2.5 MILLIGRAM(S): 2.5 TABLET ORAL at 11:54

## 2020-08-26 RX ADMIN — HALOPERIDOL DECANOATE 5 MILLIGRAM(S): 100 INJECTION INTRAMUSCULAR at 02:43

## 2020-08-26 RX ADMIN — Medication 5 MILLIGRAM(S): at 02:19

## 2020-08-26 RX ADMIN — Medication 650 MILLIGRAM(S): at 14:36

## 2020-08-26 RX ADMIN — MEMANTINE HYDROCHLORIDE 5 MILLIGRAM(S): 10 TABLET ORAL at 11:54

## 2020-08-26 RX ADMIN — TRAMADOL HYDROCHLORIDE 25 MILLIGRAM(S): 50 TABLET ORAL at 02:07

## 2020-08-26 RX ADMIN — Medication 650 MILLIGRAM(S): at 06:32

## 2020-08-26 NOTE — PHYSICAL THERAPY INITIAL EVALUATION ADULT - ADDITIONAL COMMENTS
pt A&Ox1 and confused, poor historian; per H&P, pt lives with wife who is bedbound and has home health aide 24/7; prior use of Assistive Device unclear at this time.

## 2020-08-26 NOTE — PHYSICAL THERAPY INITIAL EVALUATION ADULT - GENERAL OBSERVATIONS, REHAB EVAL
Pt received semi-supine no acute distress +bed alarm, cleared for PT by BHASKAR Jarvis, agreeable to PT. Left as found +call mcghee +Bed alarm, RN Simona aware. FIM 0

## 2020-08-26 NOTE — CONSULT NOTE ADULT - ASSESSMENT
per Internal Medicine    86 y.o. M PMHx closed left femoral neck fracture s/p Hemiarthroplasty of left hip (8/19/2020), Alzheimer's dementia, CKD V, Prostate CA, HTN, hyperlipidemia, Squamous Cell Carcinoma, CRI (baseline Cr 3.04, based off of previous charts), presented w/ hematuria and scrotal redness, found to be anemic and admitted for further evaluation.    Problem/Plan - 1:  ·  Problem: Hematuria.  Plan: Patient presents w/ hematuria starting on morning of admission. Has had indwelling hopper catheter since 8/21 which has been draining clear urine until day of admission. Abdomen and suprapubic region non tender. Ddx includes trauma, malignancy, kidney stones, asa use or UTI. Patient may have been trying to pull hopper out but unable to ascertain given mental status. 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 No history of kidney stones per aide, and patient lying comfortable in bed with no signs of current obstructing kidney stone. Patient started on asa 325 BID on 8/19 and this may be contributing to hematuria. Also now with UTI which may cause hematuria.  -hold ASA for now  -trend CBC  -montor urine for clearance of blood/redness- if it clears, can restart ASA.     Problem/Plan - 2:  ·  Problem: UTI (urinary tract infection).  Plan: Ua concerning for UTI: +leuk esterase, +WBC 5-10, + bacteria. Patient afebrile on admission. UTI likely 2/2 to indwelling hopper catheter since 8/21. Urology consulted, saw patient in ED. s/p vanc 1g in the ED.  -c/w ceftriaxone 1g Q 24  -f/u urine cx   -f/u blood cx.     Problem/Plan - 3:  ·  Problem: CKD (chronic kidney disease), stage V.  Plan: Hx of CKD5 (GFR 9). Creatinine bumped to 5.22 from 5.07 on discharge on 8/22.   - trend Cr  - avoid nephrotoxic drugs, renally dose meds.     Problem/Plan - 4:  ·  Problem: Urinary retention.  Plan: During prior hospitalization (8/18-8/22) for L hip fracture, patient had hopper placed by urology  (8/21) for difficult hopper placement. Evaluated by urology in the ED on this admission. Per urology, hopper flushing well and recommends to continue hopper until patient more mobile.  -f/u Ucx  -start flomax  -possible TOV when more OOB    #scrotal redness  Per aide patient has had redness since Sunday. Srotum erythematous, moist with few white plaques on exam. Non tender on exam. Likely fungal.  -keep area dry  -apply topical ointment.     Problem/Plan - 5:  ·  Problem: Metabolic acidosis.  Plan: HCO3 18 on admission. Also low (17-19) on prior admission.   -Nabicarb 650mg.     Problem/Plan - 6:  Problem: Alzheimer disease. Plan: Per aide patient appears to be at baseline mental status (A&Ox1). On memantine 7 at home.  - Continue monitor for change in mental status  -c/w memantine.    Problem/Plan - 7:  ·  Problem: Anemia.  Plan: Hgb 8.3 on admission. Patient chronically anemic (baseline ~10) likely secondary to kidney disease. Iron studies from 8/18 WNL. However, patient with elevated BUN and trending up, concerning for active hemolysis.   - trend CBC  - maintain active T&S  - transfuse if Hgb <7.     Problem/Plan - 8:  ·  Problem: Hyperlipidemia.  Plan: On atorvastatin 40mg at home.  -c/w atorvastatin.     Problem/Plan - 9:  ·  Problem: Fracture.  Plan: Closed left femoral neck fracture s/p Hemiarthroplasty of left hip (8/19/2020) after a fall. Wound healing well. Per nurse patient has not had any increasing pain requirements. Has tramadol PRN at home, and on asa 325 BID for DVT ppx.  -PT eval  -Tramadol PRN for pain  -SCDs for DVT ppx  -restart ASA once patient's urine clear and no longer red.     Problem/Plan - 10:  Problem: Preventive measure. Plan; F: tolerating PO, no IVF  E: replete K<4, Mg<2  N: Dash/TLC    VTE Prophylaxis: SCDs  GI: not needed  C: Full Code  D: RMF.

## 2020-08-26 NOTE — DISCHARGE NOTE PROVIDER - NSDCMRMEDTOKEN_GEN_ALL_CORE_FT
amLODIPine 2.5 mg oral tablet: 1 tab(s) orally once a day  Aspirin Enteric Coated 325 mg oral delayed release tablet: 1 tab(s) orally 2 times a day MDD:2  atorvastatin 20 mg oral tablet: 1 tab(s) orally once a day  memantine 7 mg oral capsule, extended release: 1 cap(s) orally once a day  traMADol 50 mg oral tablet: 1 tab(s) orally every 6 hours MDD:4

## 2020-08-26 NOTE — DISCHARGE NOTE PROVIDER - HOSPITAL COURSE
#Discharge: do not delete        86 y.o. M PMHx closed left femoral neck fracture s/p Hemiarthroplasty of left hip (8/19/2020), Alzheimer's dementia, CKD V, Prostate CA, HTN, hyperlipidemia, Squamous Cell Carcinoma, CRI (baseline Cr 3.04, based off of previous charts), presented w/ hematuria and scrotal redness, found to be anemic and admitted for further evaluation.        Problem List/Main Diagnoses (system-based):     Inpatient treatment course:     New medications:     Labs to be followed outpatient:     Exam to be followed outpatient: #Discharge: do not delete        86 y.o. M PMHx closed left femoral neck fracture s/p Hemiarthroplasty of left hip (8/19/2020), Alzheimer's dementia, CKD V, Prostate CA, HTN, hyperlipidemia, Squamous Cell Carcinoma, CRI (baseline Cr 3.04, based off of previous charts), presented w/ hematuria and scrotal redness, found to be anemic and admitted for further evaluation.        Problem List/Main Diagnoses (system-based):     #UTI    #    Inpatient treatment course:     New medications:     Labs to be followed outpatient:     Exam to be followed outpatient: #Discharge: do not delete        86 y.o. M PMHx closed left femoral neck fracture s/p Hemiarthroplasty of left hip (8/19/2020), Alzheimer's dementia, CKD V, Prostate CA, HTN, hyperlipidemia, Squamous Cell Carcinoma, CRI (baseline Cr 3.04, based off of previous charts), presented w/ hematuria and scrotal redness, found to have a uti and admitted for further evaluation.        Problem List/Main Diagnoses (system-based):     #Hematuria    -Resolved     -dc'd hopper and urine clear yellow w/o any blood        #Complicated UTI    -UA cloudy and positive for WBCs, leukocyte esterase, bacteria, blood, and negative for epithelial cells    -Urine cx still NGTD    -Treated empirically with 1000 mg ceftriaxone X1    -Start cefpodoxime 200 mg BID X7 days    -Passed trial of void        #Scrotal infection    -Evaluated by urology for swelling and redness    -Pelvic U/S Impression: No hydrocele.  No evidence of epididymo-orchitis.  No evidence of testicular torsion.    -Evaluated by wound care recommending moisture barrier ointment.    -Continue 1% clotrimazole cream        Inpatient treatment course: Ceftriaxone 1000 mg X1, Clotrimazole 1% cream    New medications: Cefpodoxime 20 mg BID X7 days, Clotrimazole 1% cream     Labs to be followed outpatient: none    Exam to be followed outpatient: none

## 2020-08-26 NOTE — ADVANCED PRACTICE NURSE CONSULT - ASSESSMENT
86 y.o. M PMHx closed left femoral neck fracture s/p Hemiarthroplasty of left hip (8/19/2020), Alzheimer's dementia, CKD V, Prostate CA, HTN, hyperlipidemia, Squamous Cell Carcinoma, CRI (baseline Cr 3.04, based off of previous charts), presented w/ hematuria and scrotal redness, found to be anemic and admitted for further evaluation. Erythema and some denudement noted to scrotum but no ulcerations or placques. Small streaks of blood observed to underpad but probably from the denudement. Moisture barrier ointment applied liberally over scrotum.

## 2020-08-26 NOTE — PHYSICAL THERAPY INITIAL EVALUATION ADULT - PERTINENT HX OF CURRENT PROBLEM, REHAB EVAL
86 y.o. M PMHx closed left femoral neck fracture s/p Hemiarthroplasty of left hip (8/19/2020), CKD V, Alzheimer's dementia, Prostate CA, HTN, hyperlipidemia, Squamous Cell Carcinoma, CRI (baseline Cr 3.04, based off of previous charts), brought from home for evaluation of hematuria noted at ~5am on day of admission and scrotal redness. Patient with advanced dementia and A&Ox0 at baseline so most history obtained from chart and aide.

## 2020-08-26 NOTE — PHYSICAL THERAPY INITIAL EVALUATION ADULT - LIVES WITH, PROFILE
pt A&Ox1 and confused, poor historian; per H&P, pt lives with wife who is bedbound and has home health aide 24/7

## 2020-08-26 NOTE — PHYSICAL THERAPY INITIAL EVALUATION ADULT - CRITERIA FOR SKILLED THERAPEUTIC INTERVENTIONS
therapy frequency/functional limitations in following categories/anticipated equipment needs at discharge/anticipated discharge recommendation/predicted duration of therapy intervention/impairments found/risk reduction/prevention/rehab potential

## 2020-08-26 NOTE — CONSULT NOTE ADULT - SUBJECTIVE AND OBJECTIVE BOX
Patient is a 86y old  Male who presents with a chief complaint of bloody urine (25 Aug 2020 14:55)       HPI:  86 y.o. M PMHx closed left femoral neck fracture s/p Hemiarthroplasty of left hip (2020), CKD V, Alzheimer's dementia, Prostate CA, HTN, hyperlipidemia, Squamous Cell Carcinoma, CRI (baseline Cr 3.04, based off of previous charts), brought from home for evaluation of hematuria noted at ~5am on day of admission and scrotal redness. Patient with advanced dementia and A&Ox0 at baseline so most history obtained from chart and aide.     Aide at bedside reports overnight aide noticed blood draining into hopper at around 5am this morning. Patient recently admitted for a fall w/ hip fracture and had Hopper placed on day prior to discharge for urinary retention s/p Hemiarthroplasty procedure. Was discharged home on  and was draining clear urine from hopper until morning of admission. Patient was discharged with new prescription of asa 325 BID for post procedure DVT ppx. 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 No history of kidney stones. Aide also mentioned she first notice increased redness and white plaques on scrotum starting two days prior to admission. Had been putting ointment on affected area and says there is a reduction of the redness and plaque. Unable to fully assess ROS given patient's mental status. However per aide, patient has not had fevers, chills, nausea, vomiting, diarrhea, constipation, LE edema.      ED course  T 98.3-98.9, HR 96->85, /91->151/62, RR 20 O2 sat 100%  Labs notable for Hgb/Hct 8.3/25.6, metamyelocyte 0.9, myelocyte % 1.8, HCO3 18, BUN 81, Creatnine  5.22, GFR 9  Ua: +leuk esterase, + blood, + protein 100, +WBC 5-10, +RBC >5, +granular casts, +glucose 100     Duplex u/s of lower extremities (): No DVT seen  CXR (): pending  U/S Scrotum (): No hydrocele. No evidence of epididymo-orchitis. No evidence of testicular torsion.    Given vanc 1g, blood cx sent, urine cx sent. Urology consulted. (25 Aug 2020 14:55)      PAST MEDICAL & SURGICAL HISTORY:  Chronic kidney disease, unspecified CKD stage  Cancer of skin, squamous cell  Prostate CA  Alzheimer disease  Hyperlipidemia  HTN (hypertension)  H/O prostate cancer      MEDICATIONS  (STANDING):  amLODIPine   Tablet 2.5 milliGRAM(s) Oral daily  atorvastatin 20 milliGRAM(s) Oral at bedtime  cefTRIAXone   IVPB 1000 milliGRAM(s) IV Intermittent every 24 hours  clotrimazole 1% Cream 1 Application(s) Topical every 12 hours  memantine 5 milliGRAM(s) Oral daily  sodium bicarbonate 650 milliGRAM(s) Oral three times a day  tamsulosin 0.4 milliGRAM(s) Oral at bedtime    MEDICATIONS  (PRN):  traMADol 25 milliGRAM(s) Oral every 12 hours PRN Severe Pain (7 - 10)      Social History: unknown,  patient is A&O x 0 secondary to dementia           Baseline Functional Level Prior to Admission: unknown,  patient is A&O x 0 secondary to dementia    FAMILY HISTORY:      CBC Full  -  ( 26 Aug 2020 05:49 )  WBC Count : 8.41 K/uL  RBC Count : 2.44 M/uL  Hemoglobin : 7.7 g/dL  Hematocrit : 24.3 %  Platelet Count - Automated : 241 K/uL  Mean Cell Volume : 99.6 fl  Mean Cell Hemoglobin : 31.6 pg  Mean Cell Hemoglobin Concentration : 31.7 gm/dL  Auto Neutrophil # : 5.99 K/uL  Auto Lymphocyte # : 0.92 K/uL  Auto Monocyte # : 0.67 K/uL  Auto Eosinophil # : 0.27 K/uL  Auto Basophil # : 0.03 K/uL  Auto Neutrophil % : 71.2 %  Auto Lymphocyte % : 10.9 %  Auto Monocyte % : 8.0 %  Auto Eosinophil % : 3.2 %  Auto Basophil % : 0.4 %          138  |  105  |  85<H>  ----------------------------<  129<H>  3.8   |  18<L>  |  4.95<H>    Ca    9.3      26 Aug 2020 05:49  Phos  3.8     -  Mg     2.0         TPro  5.9<L>  /  Alb  2.9<L>  /  TBili  0.4  /  DBili  x   /  AST  29  /  ALT  10  /  AlkPhos  90  08-      Urinalysis Basic - ( 25 Aug 2020 09:39 )    Color: Red / Appearance: Cloudy / S.020 / pH: x  Gluc: x / Ketone: NEGATIVE  / Bili: Negative / Urobili: 1.0 E.U./dL   Blood: x / Protein: 100 mg/dL / Nitrite: NEGATIVE   Leuk Esterase: Small / RBC: See Note /HPF / WBC 5-10 /HPF   Sq Epi: x / Non Sq Epi: 0-5 /HPF / Bacteria: Present /HPF          Radiology:              Vital Signs Last 24 Hrs  T(C): 37 (26 Aug 2020 06:12), Max: 37.2 (25 Aug 2020 09:40)  T(F): 98.6 (26 Aug 2020 06:12), Max: 98.9 (25 Aug 2020 09:40)  HR: 104 (26 Aug 2020 06:12) (79 - 104)  BP: 165/78 (26 Aug 2020 06:12) (126/70 - 165/78)  BP(mean): --  RR: 18 (26 Aug 2020 06:12) (18 - 20)  SpO2: 97% (26 Aug 2020 06:12) (96% - 100%)    REVIEW OF SYSTEMS:    CONSTITUTIONAL: No fever, weight loss, or fatigue  EYES: No eye pain, visual disturbances, or discharge  ENMT:  No difficulty hearing, tinnitus, vertigo; No sinus or throat pain  NECK: No pain or stiffness  BREASTS: No pain, masses, or nipple discharge  RESPIRATORY: No cough, wheezing, chills or hemoptysis; No shortness of breath  CARDIOVASCULAR: No chest pain, palpitations, dizziness, or leg swelling  GASTROINTESTINAL: No abdominal or epigastric pain. No nausea, vomiting, or hematemesis; No diarrhea or constipation. No melena or hematochezia.  GENITOURINARY: No dysuria, frequency, hematuria, or incontinence  NEUROLOGICAL: No headaches, memory loss, loss of strength, numbness, or tremors  SKIN: No itching, burning, rashes, or lesions   LYMPH NODES: No enlarged glands  ENDOCRINE: No heat or cold intolerance; No hair loss  MUSCULOSKELETAL: No joint pain or swelling; No muscle, back, or extremity pain  PSYCHIATRIC: No depression, anxiety, mood swings, or difficulty sleeping  HEME/LYMPH: No easy bruising, or bleeding gums  ALLERGY AND IMMUNOLOGIC: No hives or eczema  VASCULAR: no swelling, erythema,   :  hematuria        Physical Exam: frail 87 yo  gentleman lying in bed, non verbal, NAD    Head: normocephalic, atraumatic    Eyes: PERRLA, EOMI, no nystagmus, sclera anicteric    ENT: nasal discharge, uvula midline, no oropharyngeal erythema/exudate    Neck: supple, negative JVD, negative carotid bruits, no thyromegaly    Chest: CTA bilaterally, neg wheeze/ rhonchi/ rales/ crackles/ egophany    Cardiovascular: regular rate and rhythm, neg murmurs/rubs/gallops    Abdomen: soft, non distended, non tender to palpation in all 4 quadrants, negative rebound/guarding, normal bowel sounds    Extremities: WWP, neg cyanosis/clubbing/edema, negative calf tenderness to palpation, negative Dakotah's sign    Musculoskeletal:      :     Neurologic Exam:    Alert and oriented x 0 , mumbling    Motor Exam:    Upper Extremities:     Right:   suboptimal effort secondary to mental status    Left:    suboptimal effort secondary to mental status      Lower Extremities:    Right:    suboptimal effort secondary to mental status    Left :    suboptimal effort secondary to mental status               Sensation:  unable to assess secondary to mental status                       DTR:            = biceps/     triceps/     brachioradialis                      = patella/   medial hamstring/ankle                      neg clonus                      neg Babinski                          Gait:  not tested        PM&R Impression:    1) deconditioned  2) h/o Left hip hemiarthroplasty  3) reportedly bedbound and has 24 hr HHA    Plan:    1) Physical therapy focusing on therapeutic exercises, bed mobility/transfer out of bed evaluation, progressive ambulation with assistive devices prn.    2) Anticipated Disposition Plan/Recs:     d/c home, home PT, 24 hr x 7 day home care

## 2020-08-26 NOTE — DISCHARGE NOTE NURSING/CASE MANAGEMENT/SOCIAL WORK - PATIENT PORTAL LINK FT
You can access the FollowMyHealth Patient Portal offered by [facility] by registering at the following website: [email or web address]. By joining [employer]’s FollowMyHealth portal, you will also be able to view your health information using other applications (apps) compatible with our system.

## 2020-08-26 NOTE — DISCHARGE NOTE PROVIDER - NSDCCPCAREPLAN_GEN_ALL_CORE_FT
PRINCIPAL DISCHARGE DIAGNOSIS  Diagnosis: Complicated UTI (urinary tract infection)  Assessment and Plan of Treatment: You came to the hospital with blood in your urine so we tested it and found an infection.  We treated you with antibiotics and would like you to take the following medication:  Cefpodoxine 200 mg by mouth two times a day for 7 days.  If you begin to notice any fevers, chills, pain or burining with urination, back pain, or any other new or different symptoms, please come back to the hospital.  Please schedule a follow up appointment with your primary care physician in about a week.  Thank you for letting us take care of you!        SECONDARY DISCHARGE DIAGNOSES  Diagnosis: Scrotal infection  Assessment and Plan of Treatment: You came to the hospital with swelling and redness in and around your scrotum.  You were evaluated by the Urology doctors who had you get an ultrasound, which did not show any infection or fluid in your scrotum.  Please continue to apply Clotrimazole 1% cream to the red areas of skin on and around your scrotum.

## 2020-08-26 NOTE — PHYSICAL THERAPY INITIAL EVALUATION ADULT - IMPAIRMENTS FOUND, PT EVAL
aerobic capacity/endurance/gross motor/gait, locomotion, and balance/muscle strength/poor safety awareness/posture

## 2020-08-27 LAB
CULTURE RESULTS: NO GROWTH — SIGNIFICANT CHANGE UP
SPECIMEN SOURCE: SIGNIFICANT CHANGE UP

## 2020-08-30 ENCOUNTER — TRANSCRIPTION ENCOUNTER (OUTPATIENT)
Age: 85
End: 2020-08-30

## 2020-08-30 LAB
CULTURE RESULTS: SIGNIFICANT CHANGE UP
CULTURE RESULTS: SIGNIFICANT CHANGE UP
SPECIMEN SOURCE: SIGNIFICANT CHANGE UP
SPECIMEN SOURCE: SIGNIFICANT CHANGE UP

## 2020-09-02 DIAGNOSIS — S72.032A DISPLACED MIDCERVICAL FRACTURE OF LEFT FEMUR, INITIAL ENCOUNTER FOR CLOSED FRACTURE: ICD-10-CM
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
## 2020-09-02 DIAGNOSIS — D64.9 ANEMIA, UNSPECIFIED: ICD-10-CM

## 2020-09-04 ENCOUNTER — INPATIENT (INPATIENT)
Facility: HOSPITAL | Age: 85
LOS: 7 days | Discharge: NURSING, REHAB OR LONG-TERM CARE | DRG: 467 | End: 2020-09-12
Attending: ORTHOPAEDIC SURGERY | Admitting: ORTHOPAEDIC SURGERY
Payer: MEDICARE

## 2020-09-04 VITALS
HEIGHT: 62 IN | OXYGEN SATURATION: 97 % | RESPIRATION RATE: 20 BRPM | HEART RATE: 91 BPM | DIASTOLIC BLOOD PRESSURE: 59 MMHG | WEIGHT: 89.95 LBS | SYSTOLIC BLOOD PRESSURE: 131 MMHG | TEMPERATURE: 98 F

## 2020-09-04 DIAGNOSIS — Z85.46 PERSONAL HISTORY OF MALIGNANT NEOPLASM OF PROSTATE: Chronic | ICD-10-CM

## 2020-09-04 LAB
ALBUMIN SERPL ELPH-MCNC: 3.5 G/DL — SIGNIFICANT CHANGE UP (ref 3.3–5)
ALP SERPL-CCNC: 91 U/L — SIGNIFICANT CHANGE UP (ref 40–120)
ALT FLD-CCNC: 13 U/L — SIGNIFICANT CHANGE UP (ref 10–45)
ANION GAP SERPL CALC-SCNC: 14 MMOL/L — SIGNIFICANT CHANGE UP (ref 5–17)
APPEARANCE UR: CLEAR — SIGNIFICANT CHANGE UP
AST SERPL-CCNC: 16 U/L — SIGNIFICANT CHANGE UP (ref 10–40)
BACTERIA # UR AUTO: PRESENT /HPF
BASOPHILS # BLD AUTO: 0.04 K/UL — SIGNIFICANT CHANGE UP (ref 0–0.2)
BASOPHILS NFR BLD AUTO: 0.4 % — SIGNIFICANT CHANGE UP (ref 0–2)
BILIRUB SERPL-MCNC: 0.2 MG/DL — SIGNIFICANT CHANGE UP (ref 0.2–1.2)
BILIRUB UR-MCNC: NEGATIVE — SIGNIFICANT CHANGE UP
BLD GP AB SCN SERPL QL: NEGATIVE — SIGNIFICANT CHANGE UP
BUN SERPL-MCNC: 62 MG/DL — HIGH (ref 7–23)
CALCIUM SERPL-MCNC: 8.6 MG/DL — SIGNIFICANT CHANGE UP (ref 8.4–10.5)
CHLORIDE SERPL-SCNC: 102 MMOL/L — SIGNIFICANT CHANGE UP (ref 96–108)
CO2 SERPL-SCNC: 21 MMOL/L — LOW (ref 22–31)
COLOR SPEC: YELLOW — SIGNIFICANT CHANGE UP
CREAT SERPL-MCNC: 5.15 MG/DL — HIGH (ref 0.5–1.3)
DIFF PNL FLD: ABNORMAL
EOSINOPHIL # BLD AUTO: 0.23 K/UL — SIGNIFICANT CHANGE UP (ref 0–0.5)
EOSINOPHIL NFR BLD AUTO: 2.6 % — SIGNIFICANT CHANGE UP (ref 0–6)
EPI CELLS # UR: SIGNIFICANT CHANGE UP /HPF (ref 0–5)
GLUCOSE SERPL-MCNC: 175 MG/DL — HIGH (ref 70–99)
GLUCOSE UR QL: NEGATIVE — SIGNIFICANT CHANGE UP
HCT VFR BLD CALC: 21.1 % — LOW (ref 39–50)
HGB BLD-MCNC: 6.7 G/DL — CRITICAL LOW (ref 13–17)
HYALINE CASTS # UR AUTO: SIGNIFICANT CHANGE UP /LPF (ref 0–2)
IMM GRANULOCYTES NFR BLD AUTO: 1.5 % — SIGNIFICANT CHANGE UP (ref 0–1.5)
KETONES UR-MCNC: NEGATIVE — SIGNIFICANT CHANGE UP
LEUKOCYTE ESTERASE UR-ACNC: NEGATIVE — SIGNIFICANT CHANGE UP
LYMPHOCYTES # BLD AUTO: 0.94 K/UL — LOW (ref 1–3.3)
LYMPHOCYTES # BLD AUTO: 10.6 % — LOW (ref 13–44)
MCHC RBC-ENTMCNC: 31.8 GM/DL — LOW (ref 32–36)
MCHC RBC-ENTMCNC: 31.8 PG — SIGNIFICANT CHANGE UP (ref 27–34)
MCV RBC AUTO: 100 FL — SIGNIFICANT CHANGE UP (ref 80–100)
MONOCYTES # BLD AUTO: 0.48 K/UL — SIGNIFICANT CHANGE UP (ref 0–0.9)
MONOCYTES NFR BLD AUTO: 5.4 % — SIGNIFICANT CHANGE UP (ref 2–14)
NEUTROPHILS # BLD AUTO: 7.07 K/UL — SIGNIFICANT CHANGE UP (ref 1.8–7.4)
NEUTROPHILS NFR BLD AUTO: 79.5 % — HIGH (ref 43–77)
NITRITE UR-MCNC: NEGATIVE — SIGNIFICANT CHANGE UP
NRBC # BLD: 0 /100 WBCS — SIGNIFICANT CHANGE UP (ref 0–0)
PH UR: 7 — SIGNIFICANT CHANGE UP (ref 5–8)
PLATELET # BLD AUTO: 335 K/UL — SIGNIFICANT CHANGE UP (ref 150–400)
POTASSIUM SERPL-MCNC: 4.9 MMOL/L — SIGNIFICANT CHANGE UP (ref 3.5–5.3)
POTASSIUM SERPL-SCNC: 4.9 MMOL/L — SIGNIFICANT CHANGE UP (ref 3.5–5.3)
PROT SERPL-MCNC: 6.4 G/DL — SIGNIFICANT CHANGE UP (ref 6–8.3)
PROT UR-MCNC: 30 MG/DL
RBC # BLD: 2.11 M/UL — LOW (ref 4.2–5.8)
RBC # FLD: 13.2 % — SIGNIFICANT CHANGE UP (ref 10.3–14.5)
RBC CASTS # UR COMP ASSIST: < 5 /HPF — SIGNIFICANT CHANGE UP
RH IG SCN BLD-IMP: POSITIVE — SIGNIFICANT CHANGE UP
SARS-COV-2 RNA SPEC QL NAA+PROBE: SIGNIFICANT CHANGE UP
SODIUM SERPL-SCNC: 137 MMOL/L — SIGNIFICANT CHANGE UP (ref 135–145)
SP GR SPEC: 1.01 — SIGNIFICANT CHANGE UP (ref 1–1.03)
UROBILINOGEN FLD QL: 0.2 E.U./DL — SIGNIFICANT CHANGE UP
WBC # BLD: 8.89 K/UL — SIGNIFICANT CHANGE UP (ref 3.8–10.5)
WBC # FLD AUTO: 8.89 K/UL — SIGNIFICANT CHANGE UP (ref 3.8–10.5)
WBC UR QL: < 5 /HPF — SIGNIFICANT CHANGE UP

## 2020-09-04 PROCEDURE — 85730 THROMBOPLASTIN TIME PARTIAL: CPT

## 2020-09-04 PROCEDURE — 84443 ASSAY THYROID STIM HORMONE: CPT

## 2020-09-04 PROCEDURE — U0003: CPT

## 2020-09-04 PROCEDURE — 87040 BLOOD CULTURE FOR BACTERIA: CPT

## 2020-09-04 PROCEDURE — 71045 X-RAY EXAM CHEST 1 VIEW: CPT

## 2020-09-04 PROCEDURE — 93971 EXTREMITY STUDY: CPT

## 2020-09-04 PROCEDURE — 99285 EMERGENCY DEPT VISIT HI MDM: CPT | Mod: 25

## 2020-09-04 PROCEDURE — 73552 X-RAY EXAM OF FEMUR 2/>: CPT | Mod: 26,LT

## 2020-09-04 PROCEDURE — 71045 X-RAY EXAM CHEST 1 VIEW: CPT | Mod: 26

## 2020-09-04 PROCEDURE — 81001 URINALYSIS AUTO W/SCOPE: CPT

## 2020-09-04 PROCEDURE — 93005 ELECTROCARDIOGRAM TRACING: CPT

## 2020-09-04 PROCEDURE — 93010 ELECTROCARDIOGRAM REPORT: CPT

## 2020-09-04 PROCEDURE — 85610 PROTHROMBIN TIME: CPT

## 2020-09-04 PROCEDURE — 87086 URINE CULTURE/COLONY COUNT: CPT

## 2020-09-04 PROCEDURE — 70450 CT HEAD/BRAIN W/O DYE: CPT | Mod: 26

## 2020-09-04 PROCEDURE — 80053 COMPREHEN METABOLIC PANEL: CPT

## 2020-09-04 PROCEDURE — 84100 ASSAY OF PHOSPHORUS: CPT

## 2020-09-04 PROCEDURE — 36415 COLL VENOUS BLD VENIPUNCTURE: CPT

## 2020-09-04 PROCEDURE — 73502 X-RAY EXAM HIP UNI 2-3 VIEWS: CPT | Mod: 26,LT

## 2020-09-04 PROCEDURE — 86850 RBC ANTIBODY SCREEN: CPT

## 2020-09-04 PROCEDURE — 83605 ASSAY OF LACTIC ACID: CPT

## 2020-09-04 PROCEDURE — 83735 ASSAY OF MAGNESIUM: CPT

## 2020-09-04 PROCEDURE — 76870 US EXAM SCROTUM: CPT

## 2020-09-04 PROCEDURE — 97161 PT EVAL LOW COMPLEX 20 MIN: CPT

## 2020-09-04 PROCEDURE — 73700 CT LOWER EXTREMITY W/O DYE: CPT | Mod: 26,LT

## 2020-09-04 PROCEDURE — 36430 TRANSFUSION BLD/BLD COMPNT: CPT

## 2020-09-04 PROCEDURE — 86901 BLOOD TYPING SEROLOGIC RH(D): CPT

## 2020-09-04 PROCEDURE — 96374 THER/PROPH/DIAG INJ IV PUSH: CPT

## 2020-09-04 PROCEDURE — 85025 COMPLETE CBC W/AUTO DIFF WBC: CPT

## 2020-09-04 RX ORDER — SODIUM CHLORIDE 9 MG/ML
3 INJECTION INTRAMUSCULAR; INTRAVENOUS; SUBCUTANEOUS ONCE
Refills: 0 | Status: COMPLETED | OUTPATIENT
Start: 2020-09-04 | End: 2020-09-04

## 2020-09-04 RX ADMIN — SODIUM CHLORIDE 3 MILLILITER(S): 9 INJECTION INTRAMUSCULAR; INTRAVENOUS; SUBCUTANEOUS at 15:37

## 2020-09-04 NOTE — ED ADULT NURSE NOTE - OBJECTIVE STATEMENT
Patient is an 87yo male s/p left hip surgery on 8/17 reporting left hip pain and difficulty bearing weight over past few days. Denies fevers, chills, chest pain, shortness of breath, abdominal pain, n/v/d. Speaking clearly in full sentences.

## 2020-09-04 NOTE — ED PROVIDER NOTE - MUSCULOSKELETAL NEGATIVE STATEMENT, MLM
no back pain, no gout, no musculoskeletal pain, no neck pain, and no weakness.  left hip / surg site clean-- no erythema --STS - mild edema LLE

## 2020-09-04 NOTE — H&P ADULT - HISTORY OF PRESENT ILLNESS
85M with Alzheimer's dementia, Prostate CA in past, HTN, hyperlipidemia, Squamous Cell Carcinoma, CKD (baseline Cr 3.04, based off of previous charts), brought in by aide secondary to difficulty ambulating and bearing weight on L hip x 2 days. Patient s/p L FNF treated w/ hemiarthroplasty on 8/19, now found to have Lapel B periprosthetic fracture. Aide states she did not witness him falling, but the aide who cares for him at night said Wednesday evening he was unable to sleep and was walking around all night, however no specific fall noted and patient is poor historian w/ dementia at baseline.  Patient states his L leg hurts but is unable to provide any further history. Additionally aide states he has had no recent changes in behavior or mental status from basleine, no evidence of dizziness, abnormal behavior, etc.

## 2020-09-04 NOTE — ED PROVIDER NOTE - PROGRESS NOTE DETAILS
Director - pt received from Dr Finn.  VS, labs, imaging reviewed.  Pt resting comfortably.  Await ortho dispo.

## 2020-09-04 NOTE — ED PROVIDER NOTE - NEUROLOGICAL, MLM
Alert and oriented, no focal deficits, no motor or sensory deficits.  unable to flex knee and elevate leg - 2+ DP/ PT  femoral pulses-  warm feet  neuro intact-

## 2020-09-04 NOTE — ED PROVIDER NOTE - NSCAREINITIATED _GEN_ER
Hide Include Location In Plan Question?: No
Detail Level: Simple
Chava Finn(Attending)
Detail Level: Zone

## 2020-09-04 NOTE — H&P ADULT - NSHPLABSRESULTS_GEN_ALL_CORE
LABS:                                            6.7    8.89  )-----------( 335      ( 04 Sep 2020 15:35 )             21.1     09-04    137  |  102  |  62<H>  ----------------------------<  175<H>  4.9   |  21<L>  |  5.15<H>    Ca    8.6      04 Sep 2020 15:35    TPro  6.4  /  Alb  3.5  /  TBili  0.2  /  DBili  x   /  AST  16  /  ALT  13  /  AlkPhos  91  09-04      Imaging: XRs and CT scan demonstrating L hemiarthroplasty periprosthetic fracture extending from anterior aspect of femur at level of greater trochanter to just distal to tip of implant w/o fracture displacement or comminution. Stem still appears fixed within canal without significant subsidence or loosening.

## 2020-09-04 NOTE — ED PROVIDER NOTE - CLINICAL SUMMARY MEDICAL DECISION MAKING FREE TEXT BOX
85 yo male s/p left hip arthoplasty 8/17 with 2 day hx of diff weight bearing ? fall   no clear hx trauma   mild prox leg swelling check hip films ? disloc  loosening ? -possible need for doppler will CT head  check labs   low susp for cva or spinal cord 87 yo male  HTN HLD s/p left hip arthoplasty 8/17 with 2 day hx of diff weight bearing ? fall   no clear hx trauma   mild prox leg swelling check hip films ? disloc  loosening ? -possible need for doppler will CT head  check labs -  low susp for cva or spinal cord path-- CtT hip  noted hematoma  8 x 10 cm  left hip  await ortho to eval pt and CT scans-  currently beng transfused HS  stable

## 2020-09-04 NOTE — ED ADULT NURSE REASSESSMENT NOTE - NS ED NURSE REASSESS COMMENT FT1
1st unit PRBC completed, pending second unit. Tolerated well, no s/s infusion reaction.
Report given to BHASKAR Montemayor. Pt in ER holding, pending bed assignment.
pt 15 mins into 1st unit PRBC transfusion, denies complaints. tolerating transfusion. VSS, mild increase in BP (see flowsheet), MD Hopkins made aware, pt comfortable w/o CP, pt with pressured and confused speech, as baseline. will continue to monitor

## 2020-09-04 NOTE — ED PROVIDER NOTE - CARE PLAN
Principal Discharge DX:	Leg pain Principal Discharge DX:	Leg pain  Secondary Diagnosis:	Hip hematoma, left Principal Discharge DX:	Leg pain  Secondary Diagnosis:	Hip hematoma, left  Secondary Diagnosis:	Periprosth fracture around unsp internal prosth joint, init

## 2020-09-04 NOTE — H&P ADULT - NSHPPHYSICALEXAM_GEN_ALL_CORE
General: well appearing, in NAD, lying comfortably in bed. Selectively answers questions, is somewhat redirectable but does not follow commands well.   LLE: No obvious shortening of leg, no ecchymosis of hip. Healing incision L hip w/o surrounding erythema or drainage, no other wounds noted. L hip soft tissue swelling compared to R hip w/ tenderness to palpation over lateral aspect of hip. 2+ DP pulse.   SILT over distal limb and symmetric to contralateral side. Noted to fire EHL/FHL/TA/GS spontaneously

## 2020-09-04 NOTE — H&P ADULT - ASSESSMENT
86 M w multiple comorb condition w L periprosthetic hip fracture following L hip hemiarthroplasty 8/19  - Admit to ortho  - NPO/IVF @ midnight   - Add on for OR 9/5 vs 9/6  - Goal H+H 10/30  - Med clearance   - Pre-op labs   - Medicine consult  - NWB LLE  - Pain control

## 2020-09-04 NOTE — ED ADULT NURSE NOTE - INTERVENTIONS DEFINITIONS
Monitor for mental status changes and reorient to person, place, and time/Instruct patient to call for assistance/Physically safe environment: no spills, clutter or unnecessary equipment/Stretcher in lowest position, wheels locked, appropriate side rails in place/Provide visual cue, wrist band, yellow gown, etc./Monitor gait and stability

## 2020-09-04 NOTE — H&P ADULT - ATTENDING COMMENTS
Seen by me this morning. He is unable to give a history and records from [facility] are hazy. Unclear whether there was a trauma.     Left hip swollen and ecchymotic about the surgical site; incision well approximated without dehiscence or drainage.  Uncooperative with exam but able to demonstrate flickers of ankle and toe motion  Foot warm, CR <2s    Plan for left hip revision hemiarthroplasty with ORIF proximal femur tomorrow. Will continue monitoring crits today and obtain medical clearance. NPO after midnight.

## 2020-09-04 NOTE — ED PROVIDER NOTE - DIAGNOSTIC INTERPRETATION
CXR- 1 view  no consolidation no effusion  nl bones, nl mediastinum    left hip- 3 views- with pelvis- left chaparro arthoplasty ? periprosthetic fx?     left femur - 2 views- + periprost fx noted

## 2020-09-04 NOTE — ED ADULT TRIAGE NOTE - CHIEF COMPLAINT QUOTE
Patient, s/p left hip replacement two weeks ago, complaining of left leg weakness.  Patient denies any CP, SOB, N/V/D or any other complaints at this time.

## 2020-09-05 ENCOUNTER — TRANSCRIPTION ENCOUNTER (OUTPATIENT)
Age: 85
End: 2020-09-05

## 2020-09-05 LAB
ANION GAP SERPL CALC-SCNC: 12 MMOL/L — SIGNIFICANT CHANGE UP (ref 5–17)
APTT BLD: 27.4 SEC — LOW (ref 27.5–35.5)
BUN SERPL-MCNC: 65 MG/DL — HIGH (ref 7–23)
CALCIUM SERPL-MCNC: 9 MG/DL — SIGNIFICANT CHANGE UP (ref 8.4–10.5)
CHLORIDE SERPL-SCNC: 106 MMOL/L — SIGNIFICANT CHANGE UP (ref 96–108)
CO2 SERPL-SCNC: 23 MMOL/L — SIGNIFICANT CHANGE UP (ref 22–31)
CREAT SERPL-MCNC: 5.05 MG/DL — HIGH (ref 0.5–1.3)
GLUCOSE SERPL-MCNC: 105 MG/DL — HIGH (ref 70–99)
HCT VFR BLD CALC: 29.9 % — LOW (ref 39–50)
HCT VFR BLD CALC: 30.7 % — LOW (ref 39–50)
HCT VFR BLD CALC: 30.7 % — LOW (ref 39–50)
HGB BLD-MCNC: 10 G/DL — LOW (ref 13–17)
HGB BLD-MCNC: 10.1 G/DL — LOW (ref 13–17)
HGB BLD-MCNC: 9.7 G/DL — LOW (ref 13–17)
INR BLD: 1.01 — SIGNIFICANT CHANGE UP (ref 0.88–1.16)
MCHC RBC-ENTMCNC: 31.2 PG — SIGNIFICANT CHANGE UP (ref 27–34)
MCHC RBC-ENTMCNC: 31.5 PG — SIGNIFICANT CHANGE UP (ref 27–34)
MCHC RBC-ENTMCNC: 31.6 PG — SIGNIFICANT CHANGE UP (ref 27–34)
MCHC RBC-ENTMCNC: 32.4 GM/DL — SIGNIFICANT CHANGE UP (ref 32–36)
MCHC RBC-ENTMCNC: 32.6 GM/DL — SIGNIFICANT CHANGE UP (ref 32–36)
MCHC RBC-ENTMCNC: 32.9 GM/DL — SIGNIFICANT CHANGE UP (ref 32–36)
MCV RBC AUTO: 95.9 FL — SIGNIFICANT CHANGE UP (ref 80–100)
MCV RBC AUTO: 96.1 FL — SIGNIFICANT CHANGE UP (ref 80–100)
MCV RBC AUTO: 96.8 FL — SIGNIFICANT CHANGE UP (ref 80–100)
NRBC # BLD: 0 /100 WBCS — SIGNIFICANT CHANGE UP (ref 0–0)
PLATELET # BLD AUTO: 299 K/UL — SIGNIFICANT CHANGE UP (ref 150–400)
PLATELET # BLD AUTO: 301 K/UL — SIGNIFICANT CHANGE UP (ref 150–400)
PLATELET # BLD AUTO: 318 K/UL — SIGNIFICANT CHANGE UP (ref 150–400)
POTASSIUM SERPL-MCNC: 4.7 MMOL/L — SIGNIFICANT CHANGE UP (ref 3.5–5.3)
POTASSIUM SERPL-SCNC: 4.7 MMOL/L — SIGNIFICANT CHANGE UP (ref 3.5–5.3)
PROTHROM AB SERPL-ACNC: 12.1 SEC — SIGNIFICANT CHANGE UP (ref 10.6–13.6)
RBC # BLD: 3.11 M/UL — LOW (ref 4.2–5.8)
RBC # BLD: 3.17 M/UL — LOW (ref 4.2–5.8)
RBC # BLD: 3.2 M/UL — LOW (ref 4.2–5.8)
RBC # FLD: 14.2 % — SIGNIFICANT CHANGE UP (ref 10.3–14.5)
RBC # FLD: 14.5 % — SIGNIFICANT CHANGE UP (ref 10.3–14.5)
RBC # FLD: 14.5 % — SIGNIFICANT CHANGE UP (ref 10.3–14.5)
SODIUM SERPL-SCNC: 141 MMOL/L — SIGNIFICANT CHANGE UP (ref 135–145)
WBC # BLD: 10.5 K/UL — SIGNIFICANT CHANGE UP (ref 3.8–10.5)
WBC # BLD: 8.29 K/UL — SIGNIFICANT CHANGE UP (ref 3.8–10.5)
WBC # BLD: 8.98 K/UL — SIGNIFICANT CHANGE UP (ref 3.8–10.5)
WBC # FLD AUTO: 10.5 K/UL — SIGNIFICANT CHANGE UP (ref 3.8–10.5)
WBC # FLD AUTO: 8.29 K/UL — SIGNIFICANT CHANGE UP (ref 3.8–10.5)
WBC # FLD AUTO: 8.98 K/UL — SIGNIFICANT CHANGE UP (ref 3.8–10.5)

## 2020-09-05 RX ORDER — POVIDONE-IODINE 5 %
1 AEROSOL (ML) TOPICAL ONCE
Refills: 0 | Status: COMPLETED | OUTPATIENT
Start: 2020-09-05 | End: 2020-09-06

## 2020-09-05 RX ORDER — PANTOPRAZOLE SODIUM 20 MG/1
40 TABLET, DELAYED RELEASE ORAL
Refills: 0 | Status: DISCONTINUED | OUTPATIENT
Start: 2020-09-05 | End: 2020-09-12

## 2020-09-05 RX ORDER — ACETAMINOPHEN 500 MG
650 TABLET ORAL EVERY 6 HOURS
Refills: 0 | Status: DISCONTINUED | OUTPATIENT
Start: 2020-09-05 | End: 2020-09-12

## 2020-09-05 RX ORDER — ATORVASTATIN CALCIUM 80 MG/1
20 TABLET, FILM COATED ORAL AT BEDTIME
Refills: 0 | Status: DISCONTINUED | OUTPATIENT
Start: 2020-09-05 | End: 2020-09-12

## 2020-09-05 RX ORDER — AMLODIPINE BESYLATE 2.5 MG/1
2.5 TABLET ORAL DAILY
Refills: 0 | Status: DISCONTINUED | OUTPATIENT
Start: 2020-09-05 | End: 2020-09-12

## 2020-09-05 RX ORDER — TAMSULOSIN HYDROCHLORIDE 0.4 MG/1
0.4 CAPSULE ORAL AT BEDTIME
Refills: 0 | Status: DISCONTINUED | OUTPATIENT
Start: 2020-09-05 | End: 2020-09-12

## 2020-09-05 RX ORDER — ONDANSETRON 8 MG/1
4 TABLET, FILM COATED ORAL EVERY 6 HOURS
Refills: 0 | Status: DISCONTINUED | OUTPATIENT
Start: 2020-09-05 | End: 2020-09-10

## 2020-09-05 RX ORDER — MEMANTINE HYDROCHLORIDE 10 MG/1
5 TABLET ORAL DAILY
Refills: 0 | Status: DISCONTINUED | OUTPATIENT
Start: 2020-09-05 | End: 2020-09-12

## 2020-09-05 RX ORDER — SODIUM CHLORIDE 9 MG/ML
1000 INJECTION, SOLUTION INTRAVENOUS
Refills: 0 | Status: DISCONTINUED | OUTPATIENT
Start: 2020-09-05 | End: 2020-09-10

## 2020-09-05 RX ORDER — SODIUM BICARBONATE 1 MEQ/ML
650 SYRINGE (ML) INTRAVENOUS THREE TIMES A DAY
Refills: 0 | Status: DISCONTINUED | OUTPATIENT
Start: 2020-09-05 | End: 2020-09-12

## 2020-09-05 RX ORDER — TRAMADOL HYDROCHLORIDE 50 MG/1
25 TABLET ORAL EVERY 6 HOURS
Refills: 0 | Status: DISCONTINUED | OUTPATIENT
Start: 2020-09-05 | End: 2020-09-12

## 2020-09-05 RX ADMIN — Medication 650 MILLIGRAM(S): at 12:24

## 2020-09-05 RX ADMIN — Medication 650 MILLIGRAM(S): at 05:25

## 2020-09-05 RX ADMIN — ATORVASTATIN CALCIUM 20 MILLIGRAM(S): 80 TABLET, FILM COATED ORAL at 21:36

## 2020-09-05 RX ADMIN — TAMSULOSIN HYDROCHLORIDE 0.4 MILLIGRAM(S): 0.4 CAPSULE ORAL at 21:36

## 2020-09-05 RX ADMIN — SODIUM CHLORIDE 100 MILLILITER(S): 9 INJECTION, SOLUTION INTRAVENOUS at 01:26

## 2020-09-05 RX ADMIN — Medication 650 MILLIGRAM(S): at 18:15

## 2020-09-05 RX ADMIN — Medication 650 MILLIGRAM(S): at 19:03

## 2020-09-05 RX ADMIN — MEMANTINE HYDROCHLORIDE 5 MILLIGRAM(S): 10 TABLET ORAL at 12:24

## 2020-09-05 RX ADMIN — AMLODIPINE BESYLATE 2.5 MILLIGRAM(S): 2.5 TABLET ORAL at 05:25

## 2020-09-05 RX ADMIN — Medication 650 MILLIGRAM(S): at 13:30

## 2020-09-05 RX ADMIN — PANTOPRAZOLE SODIUM 40 MILLIGRAM(S): 20 TABLET, DELAYED RELEASE ORAL at 05:24

## 2020-09-05 NOTE — PROGRESS NOTE ADULT - SUBJECTIVE AND OBJECTIVE BOX
SUBJECTIVE: Patient seen and examined. Pain controlled. Pt did well o/n.  No f/c/n/v/cp/sob. Pt talking to self, at baseline level of dementia.       OBJECTIVE:  NAD  Vital Signs Last 24 Hrs  T(C): 36.8 (05 Sep 2020 05:24), Max: 37 (04 Sep 2020 23:01)  T(F): 98.3 (05 Sep 2020 05:24), Max: 98.6 (04 Sep 2020 23:01)  HR: 81 (05 Sep 2020 05:24) (81 - 97)  BP: 129/71 (05 Sep 2020 05:24) (125/72 - 168/79)  BP(mean): --  RR: 18 (05 Sep 2020 05:24) (17 - 20)  SpO2: 97% (05 Sep 2020 05:24) (95% - 99%)    Physical Exam:  General: Resting comfortably in bed. Pleasant, talking to self. NAD. Alert to self.   Extremities:        LLE: Hip with swelling laterally distal to incision; incision healing well w/o surrounding erythema or drainage. SILT L2-S1 distribution, symmetric.  Spontaneously firing TA/EHL/FHL/GS motor intact. WWP       RLE: No gross deformity. SILT L2-S1 distribution, symmetric. No pain PROM hip, knee, ankle. TA/EHL/FHL/GS motor intact. WWP                          10.1   8.98  )-----------( 301      ( 05 Sep 2020 06:53 )             30.7     09-05    141  |  106  |  65<H>  ----------------------------<  105<H>  4.7   |  23  |  5.05<H>    Ca    9.0      05 Sep 2020 06:53    TPro  6.4  /  Alb  3.5  /  TBili  0.2  /  DBili  x   /  AST  16  /  ALT  13  /  AlkPhos  91  09-04      A/P :  Pt is a 87yo Male s/p L hip chaparro for FNF 8/19  now with periprosthetic fracture, for OR 9/6  -    Pain control  -    DVT ppx: SCDs     -    Weight bearing status: WBAT   -    Physical Therapy  -    NPO after midnight/IVF  -    Preop labs  -    Cr elevated but appearing to be at baseline based on last visit; maintain IVF  -    F/u med consult for preop optimization  -    Dispo: OR 9/6 SUBJECTIVE: Patient seen and examined. Pain controlled. Pt did well o/n.  No f/c/n/v/cp/sob. Pt talking to self, at baseline level of dementia.       OBJECTIVE:  NAD  Vital Signs Last 24 Hrs  T(C): 36.8 (05 Sep 2020 05:24), Max: 37 (04 Sep 2020 23:01)  T(F): 98.3 (05 Sep 2020 05:24), Max: 98.6 (04 Sep 2020 23:01)  HR: 81 (05 Sep 2020 05:24) (81 - 97)  BP: 129/71 (05 Sep 2020 05:24) (125/72 - 168/79)  BP(mean): --  RR: 18 (05 Sep 2020 05:24) (17 - 20)  SpO2: 97% (05 Sep 2020 05:24) (95% - 99%)    Physical Exam:  General: Resting comfortably in bed. Pleasant, talking to self. NAD. Alert to self.   Extremities:        LLE: Hip with swelling laterally distal to incision; incision healing well w/o surrounding erythema or drainage. SILT L2-S1 distribution, symmetric.  Spontaneously firing TA/EHL/FHL/GS motor intact. WWP       RLE: No gross deformity. SILT L2-S1 distribution, symmetric. No pain PROM hip, knee, ankle. TA/EHL/FHL/GS motor intact. WWP                          10.1   8.98  )-----------( 301      ( 05 Sep 2020 06:53 )             30.7     09-05    141  |  106  |  65<H>  ----------------------------<  105<H>  4.7   |  23  |  5.05<H>    Ca    9.0      05 Sep 2020 06:53    TPro  6.4  /  Alb  3.5  /  TBili  0.2  /  DBili  x   /  AST  16  /  ALT  13  /  AlkPhos  91  09-04      A/P :  Pt is a 85yo Male s/p L hip chaparro for FNF 8/19  now with periprosthetic fracture, for OR 9/6  -    Pain control  -    DVT ppx: SCDs     -    Weight bearing status: NWB LLE  -    Physical Therapy  -    NPO after midnight/IVF  -    Preop labs  -    Cr elevated but appearing to be at baseline based on last visit; maintain IVF  -    F/u med consult for preop optimization  -    Dispo: OR 9/6

## 2020-09-05 NOTE — CONSULT NOTE ADULT - ATTENDING COMMENTS
85M with Alzheimer's dementia, Prostate CA in past, HTN, hyperlipidemia, Squamous Cell Carcinoma, CKD (baseline Cr 3.04, based off of previous charts), brought in by aide secondary to difficulty ambulating and bearing weight on L hip x 2 days, found to have a periprosthetic fracture, for OR 9/6.  Medicine consult for preoperative evaluation.    #Preoperative Evaluation  -Patient with RCRI of 1, low risk for low risk procedure.  May proceed with procedure.      #CKD: Continue to monitor respiratory status, continue home bicarb  #HLD/HTN: Continue statin and amlodipine  #Anemia: Suspect from recent chronic disease and prior injury.  2u with appropriate response and remains stable. Trend CBC.  #Dementia: Continue memantine, delirium precaution  #BPH: continue tamsulosin  #DVT PPx: per primary team  #Dispo: Pending, likely return to home with home care

## 2020-09-05 NOTE — DIETITIAN INITIAL EVALUATION ADULT. - OTHER INFO
85M with Alzheimer's dementia, Prostate CA in past, HTN, hyperlipidemia, Squamous Cell Carcinoma, CKD (baseline Cr 3.04, based off of previous charts), brought in by aide secondary to difficulty ambulating and bearing weight on L hip x 2 days. S/p L periprosthetic hip fracture following L hip hemiarthroplasty 8/19. Per team, plan for OR 9/6. Pt seen in room, unable to obtain hx 2/2 pt status. Per RN, pt ate broth, tea and milk for breakfast. Pt currently on full liquid diet. Per chart review, pt was 53.6kg during previous admission 2/2020, pt was 59kg in ED 8/25/2020. ABW 40.8kg, question accuracy as wt would reflect 40lb/31% wt loss x1.5 weeks. Recommend obtain reweight and trend wts 2-3x per week. Per flowsheet, no BM recorded, recommend add bowel regimen. No edema. Skin intact. NFPE unremarkable at this time. Please see recs below. Will continue to follow per RD protocol.

## 2020-09-05 NOTE — DIETITIAN INITIAL EVALUATION ADULT. - ADD RECOMMEND
1. When medically able, recommend advance diet to regular with consistency per slp. 2. Add Ensure Enlive QD (350kcal/20gpro) 3. Obtain current wt and trend wts 2-3x per week 4. Monitor lytes and replete 5. RD to remain available prn

## 2020-09-05 NOTE — CONSULT NOTE ADULT - ASSESSMENT
85M with Alzheimer's dementia, Prostate CA in past, HTN, hyperlipidemia, Squamous Cell Carcinoma, CKD (baseline Cr 3.04, based off of previous charts), brought in by aide secondary to difficulty ambulating and bearing weight on L hip x 2 days, found to have a periprosthetic fracture, for OR 9/6    # Pre-Op assessment  - unable to estimate functional capacity 2/2 dementia  - Low Risk procedure  - Pt has dementia, from documentation he has no h/o HF, CAD, CVA, or IDDA. He does have CKD5  - RCRI score 1, which correlates to 6.0% 30 day risk of MI, cardiac arrest, or death  - Shi score 1.2% risk of myocardial infarction or cardiac arrest, intraoperatively or up to 30 days post-op  -     FINAL RECS PENDING ROUNDS 85M with Alzheimer's dementia, Prostate CA in past, HTN, hyperlipidemia, Squamous Cell Carcinoma, CKD (baseline Cr 3.04, based off of previous charts), brought in by aide secondary to difficulty ambulating and bearing weight on L hip x 2 days, found to have a periprosthetic fracture, for OR 9/6    FINAL RECS PENDING ROUNDS    # Pre-Op assessment  - unable to estimate functional capacity 2/2 dementia  - Low Risk procedure  - Pt has dementia, from documentation he has no h/o HF, CAD, CVA, or IDDA. He does have CKD5  - RCRI score 1, which correlates to 6.0% 30 day risk of MI, cardiac arrest, or death  - Shi score 1.2% risk of myocardial infarction or cardiac arrest, intraoperatively or up to 30 days post-op  -    # CKD5  - non anuric, euvolemic  - K 4.7, bicarb 23, BUN/Cr 65/5.05  - avoid nephrotoxic drugs  - c/w bicarb 650 TID    # HTN  - c/w amlodipine 2.5mg     # HLD  - c/w atorvastatin 20mg    # Dementia  - c/w memantine    # BPH  - c/w tamsulosin 0.4mg PO qHS 85M with Alzheimer's dementia, Prostate CA in past, HTN, hyperlipidemia, Squamous Cell Carcinoma, CKD (baseline Cr 3.04, based off of previous charts), brought in by aide secondary to difficulty ambulating and bearing weight on L hip x 2 days, found to have a periprosthetic fracture, for OR 9/6    FINAL RECS PENDING ROUNDS    # Pre-Op assessment  - unable to estimate functional capacity 2/2 dementia  - Low Risk procedure  - Pt has dementia, from documentation he has no h/o HF, CAD, CVA, or IDDA. He does have CKD5  - RCRI score 1, which correlates to 6.0% 30 day risk of MI, cardiac arrest, or death  - Shi score 1.2% risk of myocardial infarction or cardiac arrest, intraoperatively or up to 30 days post-op  - pt is low risk undergoing a low risk procedure  - can continue his current medications    # CKD5  - non anuric, euvolemic  - K 4.7, bicarb 23, BUN/Cr 65/5.05  - avoid nephrotoxic drugs  - c/w bicarb 650 TID    # HTN  - c/w amlodipine 2.5mg     # HLD  - c/w atorvastatin 20mg    # Dementia  - c/w memantine    # BPH  - c/w tamsulosin 0.4mg PO qHS 85M with Alzheimer's dementia, Prostate CA in past, HTN, hyperlipidemia, Squamous Cell Carcinoma, CKD (baseline Cr 3.04, based off of previous charts), brought in by aide secondary to difficulty ambulating and bearing weight on L hip x 2 days, found to have a periprosthetic fracture, for OR 9/6    # Pre-Op assessment  - unable to estimate functional capacity 2/2 dementia  - Low Risk procedure  - Pt has dementia, from documentation he has no h/o HF, CAD, CVA, or IDDA. He does have CKD5  - RCRI score 1, which correlates to 6.0% 30 day risk of MI, cardiac arrest, or death  - Shi score 1.2% risk of myocardial infarction or cardiac arrest, intraoperatively or up to 30 days post-op  - pt is low risk undergoing a low risk procedure  - can continue his current medications    # CKD5  - non anuric, euvolemic  - K 4.7, bicarb 23, BUN/Cr 65/5.05  - avoid nephrotoxic drugs  - c/w bicarb 650 TID    # HTN  - c/w amlodipine 2.5mg     # HLD  - c/w atorvastatin 20mg    # Dementia  - c/w memantine    # BPH  - c/w tamsulosin 0.4mg PO qHS

## 2020-09-05 NOTE — CONSULT NOTE ADULT - SUBJECTIVE AND OBJECTIVE BOX
ILEANA CORCORAN  86y  Male      Patient is a 86y old  Male who presents with a chief complaint of L Hip periprosthetic fracture (05 Sep 2020 10:31)        PAST MEDICAL/SURGICAL HISTORY  PAST MEDICAL & SURGICAL HISTORY:  Chronic kidney disease, unspecified CKD stage  Cancer of skin, squamous cell  Prostate CA  Alzheimer disease  Hyperlipidemia  HTN (hypertension)  H/O prostate cancer      REVIEW OF SYSTEMS:  CONSTITUTIONAL: No fever, weight loss, or fatigue  EYES: No eye pain, visual disturbances, or discharge  ENMT:  No difficulty hearing, tinnitus, vertigo; No sinus or throat pain  NECK: No pain or stiffness  BREASTS: No pain, masses, or nipple discharge  RESPIRATORY: No cough, wheezing, chills or hemoptysis; No shortness of breath  CARDIOVASCULAR: No chest pain, palpitations, dizziness, or leg swelling  GASTROINTESTINAL: No abdominal or epigastric pain. No nausea, vomiting, or hematemesis; No diarrhea or constipation. No melena or hematochezia.  GENITOURINARY: No dysuria, frequency, hematuria, or incontinence  NEUROLOGICAL: No headaches, memory loss, loss of strength, numbness, or tremors  SKIN: No itching, burning, rashes, or lesions   LYMPH NODES: No enlarged glands  ENDOCRINE: No heat or cold intolerance; No hair loss  MUSCULOSKELETAL: No joint pain or swelling; No muscle, back, or extremity pain  PSYCHIATRIC: No depression, anxiety, mood swings, or difficulty sleeping  HEME/LYMPH: No easy bruising, or bleeding gums  ALLERY AND IMMUNOLOGIC: No hives or eczema    T(C): 36.8 (09-05-20 @ 09:30), Max: 37 (09-04-20 @ 23:01)  HR: 83 (09-05-20 @ 09:30) (81 - 97)  BP: 162/77 (09-05-20 @ 09:30) (125/72 - 168/79)  RR: 16 (09-05-20 @ 09:30) (16 - 20)  SpO2: 96% (09-05-20 @ 09:30) (95% - 99%)  Wt(kg): --Vital Signs Last 24 Hrs  T(C): 36.8 (05 Sep 2020 09:30), Max: 37 (04 Sep 2020 23:01)  T(F): 98.2 (05 Sep 2020 09:30), Max: 98.6 (04 Sep 2020 23:01)  HR: 83 (05 Sep 2020 09:30) (81 - 97)  BP: 162/77 (05 Sep 2020 09:30) (125/72 - 168/79)  BP(mean): --  RR: 16 (05 Sep 2020 09:30) (16 - 20)  SpO2: 96% (05 Sep 2020 09:30) (95% - 99%)    PHYSICAL EXAM:  GENERAL: NAD, well-groomed, well-developed  HEAD:  Atraumatic, Normocephalic  EYES: EOMI, PERRLA, conjunctiva and sclera clear  ENMT: No tonsillar erythema, exudates, or enlargement; Moist mucous membranes, Good dentition, No lesions  NECK: Supple, No JVD, Normal thyroid  NERVOUS SYSTEM:  Alert & Oriented X3, Good concentration; Motor Strength 5/5 B/L upper and lower extremities; DTRs 2+ intact and symmetric  CHEST/LUNG: Clear to percussion bilaterally; No rales, rhonchi, wheezing, or rubs  HEART: Regular rate and rhythm; No murmurs, rubs, or gallops  ABDOMEN: Soft, Nontender, Nondistended; Bowel sounds present  EXTREMITIES:  2+ Peripheral Pulses, No clubbing, cyanosis, or edema  LYMPH: No lymphadenopathy noted  SKIN: No rashes or lesions    Consultant(s) Notes Reviewed:  [x ] YES  [ ] NO  Care Discussed with Consultants/Other Providers [ x] YES  [ ] NO    LABS:  CBC   09-05-20 @ 06:53  Hematcorit 30.7  Hemoglobin 10.1  Mean Cell Hemoglobin 31.6  Platelet Count-Automated 301  RBC Count 3.20  Red Cell Distrib Width 14.5  Wbc Count 8.98  09-05-20 @ 00:49  Hematcorit 29.9  Hemoglobin 9.7  Mean Cell Hemoglobin 31.2  Platelet Count-Automated 299  RBC Count 3.11  Red Cell Distrib Width 14.2  Wbc Count 10.50  09-04-20 @ 15:35  Hematcorit 21.1  Hemoglobin 6.7  Mean Cell Hemoglobin 31.8  Platelet Count-Automated 335  RBC Count 2.11  Red Cell Distrib Width 13.2  Wbc Count 8.89      BMP  09-05-20 @ 06:53  Anion Gap. Serum 12  Blood Urea Nitrogen,Serm 65  Calcium, Total Serum 9.0  Carbon Dioxide, Serum 23  Chloride, Serum 106  Creatinine, Serum 5.05  eGFR in  11  eGFR in Non Afican American 10  Gloucose, serum 105  Potassium, Serum 4.7  Sodium, Serum 141              09-04-20 @ 15:35  Anion Gap. Serum 14  Blood Urea Nitrogen,Serm 62  Calcium, Total Serum 8.6  Carbon Dioxide, Serum 21  Chloride, Serum 102  Creatinine, Serum 5.15  eGFR in  11  eGFR in Non Afican American 9  Gloucose, serum 175  Potassium, Serum 4.9  Sodium, Serum 137                  CMP  09-05-20 @ 06:53  Cherelle Aminotransferase(ALT/SGPT)--  Albumin, Serum --  Alkaline Phosphatase, Serum --  Anion Gap, Serum 12  Aspartate Aminotransferase (AST/SGOT)--  Bilirubin Total, Serum --  Blood Urea Nitrogen, Serum 65  Calcium,Total Serum 9.0  Carbon Dioxide, Serum 23  Chloride, Serum 106  Creatinine, Serum 5.05  eGFR if  11  eGFR if Non African American 10  Glucose, Serum 105  Potassium, Serum 4.7  Protein Total, Serum --  Sodium, Serum 141                      09-04-20 @ 15:35  Cherelle Aminotransferase(ALT/SGPT)13  Albumin, Serum 3.5  Alkaline Phosphatase, Serum 91  Anion Gap, Serum 14  Aspartate Aminotransferase (AST/SGOT)16  Bilirubin Total, Serum 0.2  Blood Urea Nitrogen, Serum 62  Calcium,Total Serum 8.6  Carbon Dioxide, Serum 21  Chloride, Serum 102  Creatinine, Serum 5.15  eGFR if  11  eGFR if Non African American 9  Glucose, Serum 175  Potassium, Serum 4.9  Protein Total, Serum 6.4  Sodium, Serum 137                          PT/INR  PT/INR  09-05-20 @ 06:53  INR 1.01  Prothrombin Time Comment --  Prothrobin Time, [id number]      Amylase/Lipase            RADIOLOGY & ADDITIONAL TESTS:    Imaging Personally Reviewed:  [ ] YES  [ ] NO    < from: Echocardiogram (02.08.20 @ 13:17) >  CONCLUSIONS:     1. The left ventricle is normal in size, wall thickness, and systolic function with a calculated ejection fraction of 55%.   2. The right ventricle is normal in size. Right ventricular systolic function is normal.   3. The aortic valve is mildly thickened.   4. There is mild-to-moderate mitral regurgitation.   5. There is mild tricuspid regurgitation.   6. There is no echocardiographic evidence of pulmonary hypertension.   7. There is trace pulmonic regurgitation.   8. No pericardial effusion is seen.    < end of copied text > ILEANA CORCORAN  86y  Male      85M with Alzheimer's dementia, Prostate CA in past, HTN, hyperlipidemia, Squamous Cell Carcinoma, CKD (baseline Cr 3.04, based off of previous charts), brought in by aide secondary to difficulty ambulating and bearing weight on L hip x 2 days. Patient s/p L FNF treated w/ hemiarthroplasty on 8/19, now found to have [place] periprosthetic fracture. No documented falls or trauma but due to pt's dementia a history is unable to be collected. Pt reports left leg pain. Unable to find any e/o HF, CAD, CVA, or IDDA in the charts. Unable to estimate pt's functional status. He had a echo in February that reveals preserved EF without diastolic dysfunction or pHTN but did show mild-mod MR. No prior stress tests or angio to review. No documented history of reaction to anesthesia.         PAST MEDICAL/SURGICAL HISTORY  PAST MEDICAL & SURGICAL HISTORY:  Chronic kidney disease, unspecified CKD stage  Cancer of skin, squamous cell  Prostate CA  Alzheimer disease  Hyperlipidemia  HTN (hypertension)  H/O prostate cancer      REVIEW OF SYSTEMS:  CONSTITUTIONAL: No fever, weight loss, or fatigue  EYES: No eye pain, visual disturbances, or discharge  ENMT:  No difficulty hearing, tinnitus, vertigo; No sinus or throat pain  NECK: No pain or stiffness  BREASTS: No pain, masses, or nipple discharge  RESPIRATORY: No cough, wheezing, chills or hemoptysis; No shortness of breath  CARDIOVASCULAR: No chest pain, palpitations, dizziness, or leg swelling  GASTROINTESTINAL: No abdominal or epigastric pain. No nausea, vomiting, or hematemesis; No diarrhea or constipation. No melena or hematochezia.  GENITOURINARY: No dysuria, frequency, hematuria, or incontinence  NEUROLOGICAL: No headaches, memory loss, loss of strength, numbness, or tremors  SKIN: No itching, burning, rashes, or lesions   LYMPH NODES: No enlarged glands  ENDOCRINE: No heat or cold intolerance; No hair loss  MUSCULOSKELETAL: No joint pain or swelling; No muscle, back, or extremity pain  PSYCHIATRIC: No depression, anxiety, mood swings, or difficulty sleeping  HEME/LYMPH: No easy bruising, or bleeding gums  ALLERY AND IMMUNOLOGIC: No hives or eczema    T(C): 36.8 (09-05-20 @ 09:30), Max: 37 (09-04-20 @ 23:01)  HR: 83 (09-05-20 @ 09:30) (81 - 97)  BP: 162/77 (09-05-20 @ 09:30) (125/72 - 168/79)  RR: 16 (09-05-20 @ 09:30) (16 - 20)  SpO2: 96% (09-05-20 @ 09:30) (95% - 99%)  Wt(kg): --Vital Signs Last 24 Hrs  T(C): 36.8 (05 Sep 2020 09:30), Max: 37 (04 Sep 2020 23:01)  T(F): 98.2 (05 Sep 2020 09:30), Max: 98.6 (04 Sep 2020 23:01)  HR: 83 (05 Sep 2020 09:30) (81 - 97)  BP: 162/77 (05 Sep 2020 09:30) (125/72 - 168/79)  BP(mean): --  RR: 16 (05 Sep 2020 09:30) (16 - 20)  SpO2: 96% (05 Sep 2020 09:30) (95% - 99%)    PHYSICAL EXAM:  GENERAL: Pleasantly demented.   HEAD:  Atraumatic, Normocephalic  EYES: EOMI, PERRLA, conjunctiva and sclera clear  ENMT: No tonsillar erythema, exudates, or enlargement; Moist mucous membranes, Good dentition, No lesions  NECK: Supple, No JVD, Normal thyroid  NERVOUS SYSTEM:  Alert & Oriented X3, Good concentration; Motor Strength 5/5 B/L upper and lower extremities; DTRs 2+ intact and symmetric  CHEST/LUNG: Clear to percussion bilaterally; No rales, rhonchi, wheezing, or rubs  HEART: Regular rate and rhythm; No murmurs, rubs, or gallops  ABDOMEN: Soft, Nontender, Nondistended; Bowel sounds present  EXTREMITIES:  2+ Peripheral Pulses, No clubbing, cyanosis, or edema. Left hip is swollen and tender to palpation.   LYMPH: No lymphadenopathy noted  SKIN: No rashes or lesions    Consultant(s) Notes Reviewed:  [x ] YES  [ ] NO  Care Discussed with Consultants/Other Providers [ x] YES  [ ] NO    LABS:  CBC   09-05-20 @ 06:53  Hematcorit 30.7  Hemoglobin 10.1  Mean Cell Hemoglobin 31.6  Platelet Count-Automated 301  RBC Count 3.20  Red Cell Distrib Width 14.5  Wbc Count 8.98  09-05-20 @ 00:49  Hematcorit 29.9  Hemoglobin 9.7  Mean Cell Hemoglobin 31.2  Platelet Count-Automated 299  RBC Count 3.11  Red Cell Distrib Width 14.2  Wbc Count 10.50  09-04-20 @ 15:35  Hematcorit 21.1  Hemoglobin 6.7  Mean Cell Hemoglobin 31.8  Platelet Count-Automated 335  RBC Count 2.11  Red Cell Distrib Width 13.2  Wbc Count 8.89      BMP  09-05-20 @ 06:53  Anion Gap. Serum 12  Blood Urea Nitrogen,Serm 65  Calcium, Total Serum 9.0  Carbon Dioxide, Serum 23  Chloride, Serum 106  Creatinine, Serum 5.05  eGFR in  11  eGFR in Non Afican American 10  Gloucose, serum 105  Potassium, Serum 4.7  Sodium, Serum 141              09-04-20 @ 15:35  Anion Gap. Serum 14  Blood Urea Nitrogen,Serm 62  Calcium, Total Serum 8.6  Carbon Dioxide, Serum 21  Chloride, Serum 102  Creatinine, Serum 5.15  eGFR in  11  eGFR in Non Afican American 9  Gloucose, serum 175  Potassium, Serum 4.9  Sodium, Serum 137                  CMP  09-05-20 @ 06:53  Cherelle Aminotransferase(ALT/SGPT)--  Albumin, Serum --  Alkaline Phosphatase, Serum --  Anion Gap, Serum 12  Aspartate Aminotransferase (AST/SGOT)--  Bilirubin Total, Serum --  Blood Urea Nitrogen, Serum 65  Calcium,Total Serum 9.0  Carbon Dioxide, Serum 23  Chloride, Serum 106  Creatinine, Serum 5.05  eGFR if  11  eGFR if Non African American 10  Glucose, Serum 105  Potassium, Serum 4.7  Protein Total, Serum --  Sodium, Serum 141                      09-04-20 @ 15:35  Cherelle Aminotransferase(ALT/SGPT)13  Albumin, Serum 3.5  Alkaline Phosphatase, Serum 91  Anion Gap, Serum 14  Aspartate Aminotransferase (AST/SGOT)16  Bilirubin Total, Serum 0.2  Blood Urea Nitrogen, Serum 62  Calcium,Total Serum 8.6  Carbon Dioxide, Serum 21  Chloride, Serum 102  Creatinine, Serum 5.15  eGFR if  11  eGFR if Non African American 9  Glucose, Serum 175  Potassium, Serum 4.9  Protein Total, Serum 6.4  Sodium, Serum 137                          PT/INR  PT/INR  09-05-20 @ 06:53  INR 1.01  Prothrombin Time Comment --  Prothrobin Time, [id number]      Amylase/Lipase            RADIOLOGY & ADDITIONAL TESTS:    Imaging Personally Reviewed:  [ ] YES  [ ] NO    < from: Echocardiogram (02.08.20 @ 13:17) >  CONCLUSIONS:     1. The left ventricle is normal in size, wall thickness, and systolic function with a calculated ejection fraction of 55%.   2. The right ventricle is normal in size. Right ventricular systolic function is normal.   3. The aortic valve is mildly thickened.   4. There is mild-to-moderate mitral regurgitation.   5. There is mild tricuspid regurgitation.   6. There is no echocardiographic evidence of pulmonary hypertension.   7. There is trace pulmonic regurgitation.   8. No pericardial effusion is seen.    < end of copied text >

## 2020-09-05 NOTE — DIETITIAN INITIAL EVALUATION ADULT. - ENERGY NEEDS
Height: 62" Weight: 90lbs, IBW 118lbs+/-10%, %IBW 76%, BMI 16.5,  IBW used for calculations as question accuracy of ABW, adjusted for age

## 2020-09-06 ENCOUNTER — RESULT REVIEW (OUTPATIENT)
Age: 85
End: 2020-09-06

## 2020-09-06 DIAGNOSIS — T83.9XXA UNSPECIFIED COMPLICATION OF GENITOURINARY PROSTHETIC DEVICE, IMPLANT AND GRAFT, INITIAL ENCOUNTER: ICD-10-CM

## 2020-09-06 LAB
ANION GAP SERPL CALC-SCNC: 13 MMOL/L — SIGNIFICANT CHANGE UP (ref 5–17)
BLD GP AB SCN SERPL QL: NEGATIVE — SIGNIFICANT CHANGE UP
BUN SERPL-MCNC: 58 MG/DL — HIGH (ref 7–23)
CALCIUM SERPL-MCNC: 8.8 MG/DL — SIGNIFICANT CHANGE UP (ref 8.4–10.5)
CHLORIDE SERPL-SCNC: 106 MMOL/L — SIGNIFICANT CHANGE UP (ref 96–108)
CO2 SERPL-SCNC: 22 MMOL/L — SIGNIFICANT CHANGE UP (ref 22–31)
CREAT SERPL-MCNC: 4.61 MG/DL — HIGH (ref 0.5–1.3)
CULTURE RESULTS: SIGNIFICANT CHANGE UP
GLUCOSE BLDC GLUCOMTR-MCNC: 147 MG/DL — HIGH (ref 70–99)
GLUCOSE SERPL-MCNC: 103 MG/DL — HIGH (ref 70–99)
HCT VFR BLD CALC: 30.6 % — LOW (ref 39–50)
HCT VFR BLD CALC: 40.3 % — SIGNIFICANT CHANGE UP (ref 39–50)
HGB BLD-MCNC: 10 G/DL — LOW (ref 13–17)
HGB BLD-MCNC: 13.6 G/DL — SIGNIFICANT CHANGE UP (ref 13–17)
MCHC RBC-ENTMCNC: 30.2 PG — SIGNIFICANT CHANGE UP (ref 27–34)
MCHC RBC-ENTMCNC: 31.2 PG — SIGNIFICANT CHANGE UP (ref 27–34)
MCHC RBC-ENTMCNC: 32.7 GM/DL — SIGNIFICANT CHANGE UP (ref 32–36)
MCHC RBC-ENTMCNC: 33.7 GM/DL — SIGNIFICANT CHANGE UP (ref 32–36)
MCV RBC AUTO: 89.6 FL — SIGNIFICANT CHANGE UP (ref 80–100)
MCV RBC AUTO: 95.3 FL — SIGNIFICANT CHANGE UP (ref 80–100)
NRBC # BLD: 0 /100 WBCS — SIGNIFICANT CHANGE UP (ref 0–0)
NRBC # BLD: 0 /100 WBCS — SIGNIFICANT CHANGE UP (ref 0–0)
PLATELET # BLD AUTO: 227 K/UL — SIGNIFICANT CHANGE UP (ref 150–400)
PLATELET # BLD AUTO: 294 K/UL — SIGNIFICANT CHANGE UP (ref 150–400)
POTASSIUM SERPL-MCNC: 4.4 MMOL/L — SIGNIFICANT CHANGE UP (ref 3.5–5.3)
POTASSIUM SERPL-SCNC: 4.4 MMOL/L — SIGNIFICANT CHANGE UP (ref 3.5–5.3)
RBC # BLD: 3.21 M/UL — LOW (ref 4.2–5.8)
RBC # BLD: 4.5 M/UL — SIGNIFICANT CHANGE UP (ref 4.2–5.8)
RBC # FLD: 14.1 % — SIGNIFICANT CHANGE UP (ref 10.3–14.5)
RBC # FLD: 14.6 % — HIGH (ref 10.3–14.5)
RH IG SCN BLD-IMP: POSITIVE — SIGNIFICANT CHANGE UP
SODIUM SERPL-SCNC: 141 MMOL/L — SIGNIFICANT CHANGE UP (ref 135–145)
SPECIMEN SOURCE: SIGNIFICANT CHANGE UP
WBC # BLD: 12.65 K/UL — HIGH (ref 3.8–10.5)
WBC # BLD: 8.4 K/UL — SIGNIFICANT CHANGE UP (ref 3.8–10.5)
WBC # FLD AUTO: 12.65 K/UL — HIGH (ref 3.8–10.5)
WBC # FLD AUTO: 8.4 K/UL — SIGNIFICANT CHANGE UP (ref 3.8–10.5)

## 2020-09-06 PROCEDURE — 27138 REVISE HIP JOINT REPLACEMENT: CPT | Mod: LT

## 2020-09-06 PROCEDURE — 72170 X-RAY EXAM OF PELVIS: CPT | Mod: 26

## 2020-09-06 PROCEDURE — 88304 TISSUE EXAM BY PATHOLOGIST: CPT | Mod: 26

## 2020-09-06 RX ORDER — HYDROMORPHONE HYDROCHLORIDE 2 MG/ML
0.5 INJECTION INTRAMUSCULAR; INTRAVENOUS; SUBCUTANEOUS
Refills: 0 | Status: DISCONTINUED | OUTPATIENT
Start: 2020-09-06 | End: 2020-09-10

## 2020-09-06 RX ORDER — CEFPODOXIME PROXETIL 100 MG
200 TABLET ORAL DAILY
Refills: 0 | Status: DISCONTINUED | OUTPATIENT
Start: 2020-09-06 | End: 2020-09-06

## 2020-09-06 RX ORDER — CEFAZOLIN SODIUM 1 G
2000 VIAL (EA) INJECTION EVERY 8 HOURS
Refills: 0 | Status: DISCONTINUED | OUTPATIENT
Start: 2020-09-06 | End: 2020-09-06

## 2020-09-06 RX ORDER — CEFPODOXIME PROXETIL 100 MG
200 TABLET ORAL DAILY
Refills: 0 | Status: DISCONTINUED | OUTPATIENT
Start: 2020-09-07 | End: 2020-09-12

## 2020-09-06 RX ORDER — BUPIVACAINE 13.3 MG/ML
20 INJECTION, SUSPENSION, LIPOSOMAL INFILTRATION ONCE
Refills: 0 | Status: DISCONTINUED | OUTPATIENT
Start: 2020-09-06 | End: 2020-09-10

## 2020-09-06 RX ORDER — CEFAZOLIN SODIUM 1 G
2000 VIAL (EA) INJECTION EVERY 8 HOURS
Refills: 0 | Status: COMPLETED | OUTPATIENT
Start: 2020-09-06 | End: 2020-09-07

## 2020-09-06 RX ORDER — ASPIRIN/CALCIUM CARB/MAGNESIUM 324 MG
81 TABLET ORAL
Refills: 0 | Status: DISCONTINUED | OUTPATIENT
Start: 2020-09-07 | End: 2020-09-12

## 2020-09-06 RX ADMIN — AMLODIPINE BESYLATE 2.5 MILLIGRAM(S): 2.5 TABLET ORAL at 04:49

## 2020-09-06 RX ADMIN — Medication 1 APPLICATION(S): at 07:52

## 2020-09-06 RX ADMIN — Medication 650 MILLIGRAM(S): at 04:49

## 2020-09-06 RX ADMIN — HYDROMORPHONE HYDROCHLORIDE 0.5 MILLIGRAM(S): 2 INJECTION INTRAMUSCULAR; INTRAVENOUS; SUBCUTANEOUS at 16:04

## 2020-09-06 RX ADMIN — HYDROMORPHONE HYDROCHLORIDE 0.5 MILLIGRAM(S): 2 INJECTION INTRAMUSCULAR; INTRAVENOUS; SUBCUTANEOUS at 14:45

## 2020-09-06 RX ADMIN — HYDROMORPHONE HYDROCHLORIDE 0.5 MILLIGRAM(S): 2 INJECTION INTRAMUSCULAR; INTRAVENOUS; SUBCUTANEOUS at 14:27

## 2020-09-06 RX ADMIN — PANTOPRAZOLE SODIUM 40 MILLIGRAM(S): 20 TABLET, DELAYED RELEASE ORAL at 04:49

## 2020-09-06 RX ADMIN — Medication 650 MILLIGRAM(S): at 04:50

## 2020-09-06 RX ADMIN — Medication 2000 MILLIGRAM(S): at 17:05

## 2020-09-06 RX ADMIN — HYDROMORPHONE HYDROCHLORIDE 0.5 MILLIGRAM(S): 2 INJECTION INTRAMUSCULAR; INTRAVENOUS; SUBCUTANEOUS at 14:59

## 2020-09-06 NOTE — PRE-OP CHECKLIST - PATIENT SENT TO
Discharge instructions reviewed with mother. Discussed follow up appts and reasons to return to PCP and/or ED. Mother VU. Patient discharged home in stable condition with mother.   operating room

## 2020-09-06 NOTE — CONSULT NOTE ADULT - SUBJECTIVE AND OBJECTIVE BOX
HPI:  85M with Alzheimer's dementia, Prostate CA in past, HTN, hyperlipidemia, Squamous Cell Carcinoma, CKD (baseline Cr 3.04, based off of previous charts), brought in by aide secondary to difficulty ambulating and bearing weight on L hip x 2 days. Patient s/p L FNF treated w/ hemiarthroplasty on , now found to have [place] B periprosthetic fracture. Aide states she did not witness him falling, but the aide who cares for him at night said Wednesday evening he was unable to sleep and was walking around all night, however no specific fall noted and patient is poor historian w/ dementia at baseline.  Patient states his L leg hurts but is unable to provide any further history. Additionally aide states he has had no recent changes in behavior or mental status from basleine, no evidence of dizziness, abnormal behavior, etc. (04 Sep 2020 23:16)    : above noted. Called to evaluate patient. Patient had hopper placed after intubation in OR which was described as traumatic.   Patient seen on last admission.      PAST MEDICAL & SURGICAL HISTORY:  Chronic kidney disease, unspecified CKD stage  Cancer of skin, squamous cell  Prostate CA  Alzheimer disease  Hyperlipidemia  HTN (hypertension)  H/O prostate cancer      MEDICATIONS  (STANDING):  acetaminophen   Tablet .. 650 milliGRAM(s) Oral every 6 hours  amLODIPine   Tablet 2.5 milliGRAM(s) Oral daily  atorvastatin 20 milliGRAM(s) Oral at bedtime  BUpivacaine liposome 1.3% Injectable (no eMAR) 20 milliLiter(s) Local Injection once  ceFAZolin  Injectable. 2000 milliGRAM(s) IV Push every 8 hours  lactated ringers. 1000 milliLiter(s) (100 mL/Hr) IV Continuous <Continuous>  memantine 5 milliGRAM(s) Oral daily  pantoprazole    Tablet 40 milliGRAM(s) Oral before breakfast  sodium bicarbonate 650 milliGRAM(s) Oral three times a day  tamsulosin 0.4 milliGRAM(s) Oral at bedtime    MEDICATIONS  (PRN):  ondansetron Injectable 4 milliGRAM(s) IV Push every 6 hours PRN Nausea and/or Vomiting  traMADol 25 milliGRAM(s) Oral every 6 hours PRN Severe Pain (7 - 10)      Allergies    penicillins (Rash)    Intolerances        SOCIAL HISTORY:    FAMILY HISTORY:      Vital Signs Last 24 Hrs  T(C): 36.4 (06 Sep 2020 08:06), Max: 36.9 (05 Sep 2020 20:35)  T(F): 97.6 (06 Sep 2020 08:06), Max: 98.5 (05 Sep 2020 20:35)  HR: 79 (06 Sep 2020 08:06) (77 - 87)  BP: 144/71 (06 Sep 2020 08:06) (131/64 - 165/99)  BP(mean): --  RR: 14 (06 Sep 2020 08:06) (14 - 17)  SpO2: 96% (06 Sep 2020 08:06) (95% - 97%)    On PE:  General:  Abdomen:  : FC intact, urine  EXT:    LABS:                        10.0   8.40  )-----------( 294      ( 06 Sep 2020 07:36 )             30.6     09-06    141  |  106  |  58<H>  ----------------------------<  103<H>  4.4   |  22  |  4.61<H>    Ca    8.8      06 Sep 2020 07:36    TPro  6.4  /  Alb  3.5  /  TBili  0.2  /  DBili  x   /  AST  16  /  ALT  13  /  AlkPhos  91  09-04    PT/INR - ( 05 Sep 2020 06:53 )   PT: 12.1 sec;   INR: 1.01          PTT - ( 05 Sep 2020 06:53 )  PTT:27.4 sec  Urinalysis Basic - ( 04 Sep 2020 15:36 )    Color: Yellow / Appearance: Clear / S.015 / pH: x  Gluc: x / Ketone: NEGATIVE  / Bili: Negative / Urobili: 0.2 E.U./dL   Blood: x / Protein: 30 mg/dL / Nitrite: NEGATIVE   Leuk Esterase: NEGATIVE / RBC: < 5 /HPF / WBC < 5 /HPF   Sq Epi: x / Non Sq Epi: 0-5 /HPF / Bacteria: Present /HPF        RADIOLOGY & ADDITIONAL STUDIES: HPI:  85M with Alzheimer's dementia, Prostate CA in past, HTN, hyperlipidemia, Squamous Cell Carcinoma, CKD (baseline Cr 3.04, based off of previous charts), brought in by aide secondary to difficulty ambulating and bearing weight on L hip x 2 days. Patient s/p L FNF treated w/ hemiarthroplasty on , now found to have [place] B periprosthetic fracture. Aide states she did not witness him falling, but the aide who cares for him at night said Wednesday evening he was unable to sleep and was walking around all night, however no specific fall noted and patient is poor historian w/ dementia at baseline.  Patient states his L leg hurts but is unable to provide any further history. Additionally aide states he has had no recent changes in behavior or mental status from basleine, no evidence of dizziness, abnormal behavior, etc. (04 Sep 2020 23:16)    : above noted. Called to evaluate patient. Patient had hopper placed after intubation in OR which was described as traumatic.   Patient seen on last admission for difficult hopper placement. Patient poor historian secondary to dementia. Saw patient in PACU.      PAST MEDICAL & SURGICAL HISTORY:  Chronic kidney disease, unspecified CKD stage  Cancer of skin, squamous cell  Prostate CA  Alzheimer disease  Hyperlipidemia  HTN (hypertension)  H/O prostate cancer      MEDICATIONS  (STANDING):  acetaminophen   Tablet .. 650 milliGRAM(s) Oral every 6 hours  amLODIPine   Tablet 2.5 milliGRAM(s) Oral daily  atorvastatin 20 milliGRAM(s) Oral at bedtime  BUpivacaine liposome 1.3% Injectable (no eMAR) 20 milliLiter(s) Local Injection once  ceFAZolin  Injectable. 2000 milliGRAM(s) IV Push every 8 hours  lactated ringers. 1000 milliLiter(s) (100 mL/Hr) IV Continuous <Continuous>  memantine 5 milliGRAM(s) Oral daily  pantoprazole    Tablet 40 milliGRAM(s) Oral before breakfast  sodium bicarbonate 650 milliGRAM(s) Oral three times a day  tamsulosin 0.4 milliGRAM(s) Oral at bedtime    MEDICATIONS  (PRN):  ondansetron Injectable 4 milliGRAM(s) IV Push every 6 hours PRN Nausea and/or Vomiting  traMADol 25 milliGRAM(s) Oral every 6 hours PRN Severe Pain (7 - 10)      Allergies    penicillins (Rash)    Intolerances        SOCIAL HISTORY:    FAMILY HISTORY:      Vital Signs Last 24 Hrs  T(C): 36.4 (06 Sep 2020 08:06), Max: 36.9 (05 Sep 2020 20:35)  T(F): 97.6 (06 Sep 2020 08:06), Max: 98.5 (05 Sep 2020 20:35)  HR: 79 (06 Sep 2020 08:06) (77 - 87)  BP: 144/71 (06 Sep 2020 08:06) (131/64 - 165/99)  BP(mean): --  RR: 14 (06 Sep 2020 08:06) (14 - 17)  SpO2: 96% (06 Sep 2020 08:06) (95% - 97%)    On PE:  General: awake, responsive  Abdomen: soft, NT, ND  : #14fr hopper, not draining well, +heme      LABS:                        10.0   8.40  )-----------( 294      ( 06 Sep 2020 07:36 )             30.6     09-06    141  |  106  |  58<H>  ----------------------------<  103<H>  4.4   |  22  |  4.61<H>    Ca    8.8      06 Sep 2020 07:36    TPro  6.4  /  Alb  3.5  /  TBili  0.2  /  DBili  x   /  AST  16  /  ALT  13  /  AlkPhos  91  09-04    PT/INR - ( 05 Sep 2020 06:53 )   PT: 12.1 sec;   INR: 1.01          PTT - ( 05 Sep 2020 06:53 )  PTT:27.4 sec  Urinalysis Basic - ( 04 Sep 2020 15:36 )    Color: Yellow / Appearance: Clear / S.015 / pH: x  Gluc: x / Ketone: NEGATIVE  / Bili: Negative / Urobili: 0.2 E.U./dL   Blood: x / Protein: 30 mg/dL / Nitrite: NEGATIVE   Leuk Esterase: NEGATIVE / RBC: < 5 /HPF / WBC < 5 /HPF   Sq Epi: x / Non Sq Epi: 0-5 /HPF / Bacteria: Present /HPF        RADIOLOGY & ADDITIONAL STUDIES:

## 2020-09-06 NOTE — CONSULT NOTE ADULT - PROBLEM SELECTOR RECOMMENDATION 9
-hopper removed, not in proper position.  -failed multiple attempts to place hopper. +false passages.   -Bedside cystoscopy performed at bedside and #18fr [place] placed.  -continue hopper for at least 5 days  -continue flomax  -further w/u as outpt.   -continue present care per ortho

## 2020-09-06 NOTE — PROGRESS NOTE ADULT - SUBJECTIVE AND OBJECTIVE BOX
Ortho Post Op Check    Procedure: L hip chaparro revision (prosthesis explant, long stem replacement w/ cerclage cabling)  Surgeon: Oh    Pt comfortable without complaints, pain controlled. Pt oriented x1, no complaints, no evidence of SOB or distress. Urology at bedside performing hopper replacement under cystoscopy      Vital Signs Last 24 Hrs  T(C): 37.1 (09-06-20 @ 14:35), Max: 37.1 (09-06-20 @ 14:35)  T(F): 98.8 (09-06-20 @ 14:35), Max: 98.8 (09-06-20 @ 14:35)  HR: 92 (09-06-20 @ 14:35) (64 - 92)  BP: 130/72 (09-06-20 @ 14:35) (130/72 - 167/90)  BP(mean): 94 (09-06-20 @ 14:35) (94 - 116)  RR: 34 (09-06-20 @ 14:35) (18 - 34)  SpO2: 92% (09-06-20 @ 14:35) (85% - 100%)      Physical Exam:  General: Resting comfortably in bed. NAD. Oriented to self.   Extremities:        LLE: Dressing c/d/i. SILT L2-S1 distribution, symmetric. Spontaneously firing quad, TA/EHL/FHL/GS.       RLE: No gross deformity. SILT L2-S1 distribution, symmetric. TA/EHL/FHL/GS motor intact. WWP, DP 2+               Labs:               13.6   12.65 )-----------( 227      ( 06 Sep 2020 13:07 )             40.3   06 Sep 2020 07:36    141    |  106    |  58     ----------------------------<  103    4.4     |  22     |  4.61       TPro  6.4    /  Alb  3.5    /  TBili  0.2    /  DBili  x      /  AST  16     /  ALT  13     /  AlkPhos  91     04 Sep 2020 15:35      Post-op X-Ray: Long-stem femoral component w/ 4 cerclage wires      A/P: 86yMale POD#0 s/p L hip chaparro revision w/ long stem and cerclage wires 9/6. Of note pt had 5U transfused in OR. PACU CBC stable.  - Stable  - Pain Control  - DVT ppx: ASA 81 BID  - Post op abx: Ancef x 24 hr, followed by cefpodoxime x 7d (200 q24h renal adjustment)  - Leave Hopper in at least 4-5 days per urology recs  - PT, WBS: WBAT w/ posterior precautions    Ortho Pager [phone] Ortho Post Op Check    Procedure: L hip chaparro revision (prosthesis explant, long stem replacement w/ cerclage cabling)  Surgeon: Oh    Pt comfortable without complaints, pain controlled. Pt oriented x1, no complaints, no evidence of SOB or distress. Urology at bedside performing hopper replacement under cystoscopy      Vital Signs Last 24 Hrs  T(C): 37.1 (09-06-20 @ 14:35), Max: 37.1 (09-06-20 @ 14:35)  T(F): 98.8 (09-06-20 @ 14:35), Max: 98.8 (09-06-20 @ 14:35)  HR: 92 (09-06-20 @ 14:35) (64 - 92)  BP: 130/72 (09-06-20 @ 14:35) (130/72 - 167/90)  BP(mean): 94 (09-06-20 @ 14:35) (94 - 116)  RR: 34 (09-06-20 @ 14:35) (18 - 34)  SpO2: 92% (09-06-20 @ 14:35) (85% - 100%)      Physical Exam:  General: Resting comfortably in bed. NAD. Oriented to self.   Extremities:        LLE: Prevena in place w/ good suction. Abduction pillow in place. SILT L2-S1 distribution, symmetric. Spontaneously firing quad, TA/EHL/FHL/GS.        RLE: No gross deformity. SILT L2-S1 distribution, symmetric. TA/EHL/FHL/GS motor intact. WWP, DP 2+               Labs:               13.6   12.65 )-----------( 227      ( 06 Sep 2020 13:07 )             40.3   06 Sep 2020 07:36    141    |  106    |  58     ----------------------------<  103    4.4     |  22     |  4.61       TPro  6.4    /  Alb  3.5    /  TBili  0.2    /  DBili  x      /  AST  16     /  ALT  13     /  AlkPhos  91     04 Sep 2020 15:35      Post-op X-Ray: Long-stem femoral component w/ 4 cerclage wires      A/P: 86yMale POD#0 s/p L hip chaparro revision w/ long stem and cerclage wires 9/6. Of note pt had 5U transfused in OR. PACU CBC stable.  - Stable  - Pain Control  - DVT ppx: ASA 81 BID  - Post op abx: Ancef x 24 hr, followed by cefpodoxime x 7d (200 q24h renal adjustment)  - Leave Hopper in at least 4-5 days per urology recs  - PT, WBS: WBAT w/ posterior precautions    Ortho Pager [phone]

## 2020-09-06 NOTE — PROGRESS NOTE ADULT - SUBJECTIVE AND OBJECTIVE BOX
SUBJECTIVE: Patient seen and examined. Pain controlled. Pt did well o/n.  No f/c/n/v/cp/sob. Pt talking to self, at baseline level of dementia.       Vital Signs Last 24 Hrs  T(C): 36.8 (06 Sep 2020 04:36), Max: 36.9 (05 Sep 2020 20:35)  T(F): 98.3 (06 Sep 2020 04:36), Max: 98.5 (05 Sep 2020 20:35)  HR: 81 (06 Sep 2020 04:36) (77 - 87)  BP: 148/72 (06 Sep 2020 04:36) (131/64 - 165/99)  RR: 14 (06 Sep 2020 04:36) (14 - 17)  SpO2: 96% (06 Sep 2020 04:36) (95% - 97%)      Physical Exam:  General: Resting comfortably in bed. Pleasant, talking to self. NAD. Alert to self.   Extremities:        LLE: Hip with swelling laterally distal to incision; incision healing well w/o surrounding erythema or drainage. SILT L2-S1 distribution, symmetric.  Spontaneously firing TA/EHL/FHL/GS motor intact. WWP       RLE: No gross deformity. SILT L2-S1 distribution, symmetric. No pain PROM hip, knee, ankle. TA/EHL/FHL/GS motor intact. [facility]           Labs:                        10.0   8.40  )-----------( 294      ( 06 Sep 2020 07:36 )             30.6     09-06    141  |  106  |  58<H>  ----------------------------<  103<H>  4.4   |  22  |  4.61<H>    Ca    8.8      06 Sep 2020 07:36    TPro  6.4  /  Alb  3.5  /  TBili  0.2  /  DBili  x   /  AST  16  /  ALT  13  /  AlkPhos  91  09-04        A/P :  Pt is a 87yo Male s/p L hip chaparro for FNF 8/19  now with periprosthetic fracture, for OR 9/6  -    Pain control  -    DVT ppx: SCDs     -    Weight bearing status: NWB LLE  -    Physical Therapy  -    NPO after midnight/IVF  -    Preop labs  -    Cr elevated but appearing to be at baseline based on last visit; maintain IVF  -    F/u med recs  -    Dispo: OR 9/6

## 2020-09-06 NOTE — PACU DISCHARGE NOTE - COMMENTS
pt had an eventful stay in pacu vss pain under controlled, pt has a provena on lef hip and 18 Fr hopper cath draining blood tinged with good amount. gave report to Lev Porter pt going to room 945 via bed. pt has end stage demitiia

## 2020-09-07 LAB
ANION GAP SERPL CALC-SCNC: 18 MMOL/L — HIGH (ref 5–17)
BUN SERPL-MCNC: 59 MG/DL — HIGH (ref 7–23)
CALCIUM SERPL-MCNC: 8.1 MG/DL — LOW (ref 8.4–10.5)
CHLORIDE SERPL-SCNC: 108 MMOL/L — SIGNIFICANT CHANGE UP (ref 96–108)
CO2 SERPL-SCNC: 20 MMOL/L — LOW (ref 22–31)
CREAT SERPL-MCNC: 4.64 MG/DL — HIGH (ref 0.5–1.3)
GLUCOSE SERPL-MCNC: 123 MG/DL — HIGH (ref 70–99)
HCT VFR BLD CALC: 39.5 % — SIGNIFICANT CHANGE UP (ref 39–50)
HGB BLD-MCNC: 13 G/DL — SIGNIFICANT CHANGE UP (ref 13–17)
MCHC RBC-ENTMCNC: 30.7 PG — SIGNIFICANT CHANGE UP (ref 27–34)
MCHC RBC-ENTMCNC: 32.9 GM/DL — SIGNIFICANT CHANGE UP (ref 32–36)
MCV RBC AUTO: 93.2 FL — SIGNIFICANT CHANGE UP (ref 80–100)
NRBC # BLD: 0 /100 WBCS — SIGNIFICANT CHANGE UP (ref 0–0)
PLATELET # BLD AUTO: 203 K/UL — SIGNIFICANT CHANGE UP (ref 150–400)
POTASSIUM SERPL-MCNC: 4.7 MMOL/L — SIGNIFICANT CHANGE UP (ref 3.5–5.3)
POTASSIUM SERPL-SCNC: 4.7 MMOL/L — SIGNIFICANT CHANGE UP (ref 3.5–5.3)
RBC # BLD: 4.24 M/UL — SIGNIFICANT CHANGE UP (ref 4.2–5.8)
RBC # FLD: 15.6 % — HIGH (ref 10.3–14.5)
SODIUM SERPL-SCNC: 146 MMOL/L — HIGH (ref 135–145)
WBC # BLD: 9.72 K/UL — SIGNIFICANT CHANGE UP (ref 3.8–10.5)
WBC # FLD AUTO: 9.72 K/UL — SIGNIFICANT CHANGE UP (ref 3.8–10.5)

## 2020-09-07 RX ADMIN — Medication 650 MILLIGRAM(S): at 11:29

## 2020-09-07 RX ADMIN — Medication 81 MILLIGRAM(S): at 17:01

## 2020-09-07 RX ADMIN — Medication 650 MILLIGRAM(S): at 05:59

## 2020-09-07 RX ADMIN — Medication 650 MILLIGRAM(S): at 17:01

## 2020-09-07 RX ADMIN — Medication 650 MILLIGRAM(S): at 22:21

## 2020-09-07 RX ADMIN — Medication 200 MILLIGRAM(S): at 11:24

## 2020-09-07 RX ADMIN — MEMANTINE HYDROCHLORIDE 5 MILLIGRAM(S): 10 TABLET ORAL at 11:23

## 2020-09-07 RX ADMIN — TAMSULOSIN HYDROCHLORIDE 0.4 MILLIGRAM(S): 0.4 CAPSULE ORAL at 22:21

## 2020-09-07 RX ADMIN — AMLODIPINE BESYLATE 2.5 MILLIGRAM(S): 2.5 TABLET ORAL at 05:59

## 2020-09-07 RX ADMIN — TRAMADOL HYDROCHLORIDE 25 MILLIGRAM(S): 50 TABLET ORAL at 14:30

## 2020-09-07 RX ADMIN — Medication 650 MILLIGRAM(S): at 11:24

## 2020-09-07 RX ADMIN — ATORVASTATIN CALCIUM 20 MILLIGRAM(S): 80 TABLET, FILM COATED ORAL at 22:21

## 2020-09-07 RX ADMIN — Medication 2000 MILLIGRAM(S): at 00:14

## 2020-09-07 RX ADMIN — PANTOPRAZOLE SODIUM 40 MILLIGRAM(S): 20 TABLET, DELAYED RELEASE ORAL at 06:04

## 2020-09-07 RX ADMIN — Medication 2000 MILLIGRAM(S): at 08:49

## 2020-09-07 RX ADMIN — SODIUM CHLORIDE 100 MILLILITER(S): 9 INJECTION, SOLUTION INTRAVENOUS at 02:56

## 2020-09-07 RX ADMIN — Medication 650 MILLIGRAM(S): at 17:50

## 2020-09-07 RX ADMIN — Medication 650 MILLIGRAM(S): at 06:45

## 2020-09-07 RX ADMIN — SODIUM CHLORIDE 100 MILLILITER(S): 9 INJECTION, SOLUTION INTRAVENOUS at 22:21

## 2020-09-07 RX ADMIN — TRAMADOL HYDROCHLORIDE 25 MILLIGRAM(S): 50 TABLET ORAL at 13:59

## 2020-09-07 RX ADMIN — SODIUM CHLORIDE 100 MILLILITER(S): 9 INJECTION, SOLUTION INTRAVENOUS at 11:24

## 2020-09-07 NOTE — PHYSICAL THERAPY INITIAL EVALUATION ADULT - DISCHARGE DISPOSITION, PT EVAL
Subacute Rehab (of note, patient's family has refused Subacute Rehab in the past and have taken patient home with home physical therapy and home health aide x 2. If patient is to go home, he would require continued home PT and 24 hours aide x 2.)

## 2020-09-07 NOTE — PROGRESS NOTE ADULT - ATTENDING COMMENTS
I was physically present for the key portions of the evaluation and management (E/M) service provided.  I agree with the above history, physical, and plan which I have reviewed and edited where appropriate.     45 minutes spent on total encounter; more than 50% of the visit was spent counseling and/or coordinating care by the attending physician.

## 2020-09-07 NOTE — PROGRESS NOTE ADULT - SUBJECTIVE AND OBJECTIVE BOX
Pt seen and examined at bedside.  No acute event overnight and tolerated Left TERRELL.  Patient reports pain at the left hip with movement but no other complaints.  Limited ROS d/t mental status.    Allergies    penicillins (Rash)    Intolerances      MEDICATIONS:  MEDICATIONS  (STANDING):  acetaminophen   Tablet .. 650 milliGRAM(s) Oral every 6 hours  amLODIPine   Tablet 2.5 milliGRAM(s) Oral daily  aspirin enteric coated 81 milliGRAM(s) Oral two times a day  atorvastatin 20 milliGRAM(s) Oral at bedtime  BUpivacaine liposome 1.3% Injectable (no eMAR) 20 milliLiter(s) Local Injection once  cefpodoxime 200 milliGRAM(s) Oral daily  lactated ringers. 1000 milliLiter(s) (100 mL/Hr) IV Continuous <Continuous>  memantine 5 milliGRAM(s) Oral daily  pantoprazole    Tablet 40 milliGRAM(s) Oral before breakfast  sodium bicarbonate 650 milliGRAM(s) Oral three times a day  tamsulosin 0.4 milliGRAM(s) Oral at bedtime    MEDICATIONS  (PRN):  HYDROmorphone  Injectable 0.5 milliGRAM(s) IV Push every 15 minutes PRN Severe Pain (7 - 10)  ondansetron Injectable 4 milliGRAM(s) IV Push every 6 hours PRN Nausea and/or Vomiting  traMADol 25 milliGRAM(s) Oral every 6 hours PRN Severe Pain (7 - 10)    Vital Signs Last 24 Hrs  T(C): 36.8 (07 Sep 2020 08:30), Max: 37.2 (06 Sep 2020 20:52)  T(F): 98.2 (07 Sep 2020 08:30), Max: 98.9 (06 Sep 2020 20:52)  HR: 94 (07 Sep 2020 08:30) (65 - 102)  BP: 126/62 (07 Sep 2020 10:30) (122/73 - 167/90)  BP(mean): 95 (07 Sep 2020 05:19) (84 - 116)  RR: 18 (07 Sep 2020 08:30) (14 - 34)  SpO2: 96% (07 Sep 2020 08:30) (85% - 100%)    09-06 @ 07:01  -  09-07 @ 07:00  --------------------------------------------------------  IN: 1900 mL / OUT: 1360 mL / NET: 540 mL      PHYSICAL EXAM:    General: Elderly male comfortable in bed, in nad  HEENT: MMM, conjunctiva and sclera clear  Lung: CTABL, no w/r/r  CV: rrr, nml s1s2  Gastrointestinal: soft nt, nd  ext: left hip with dressing, c/d/i  Skin: Warm and dry.    LABS:                        13.0   9.72  )-----------( 203      ( 07 Sep 2020 07:06 )             39.5     09-07    146<H>  |  108  |  59<H>  ----------------------------<  123<H>  4.7   |  20<L>  |  4.64<H>    Ca    8.1<L>      07 Sep 2020 07:06      Culture - Urine (collected 04 Sep 2020 16:17)  Source: .Urine Clean Catch (Midstream)  Final Report (06 Sep 2020 11:59):    Contamination apparent after additional incubation. Please disregard    previous report.    Lab requests new specimen if clinically indicated.      RADIOLOGY & ADDITIONAL STUDIES: reviewed

## 2020-09-07 NOTE — PHYSICAL THERAPY INITIAL EVALUATION ADULT - MANUAL MUSCLE TESTING RESULTS, REHAB EVAL
(B) UE and (B) LE >3+/5 upon functional assessment against gravity. (L) hip strength limited 2/2 pain, however strength functional.

## 2020-09-07 NOTE — PHYSICAL THERAPY INITIAL EVALUATION ADULT - ADDITIONAL COMMENTS
Patient confused. Per previous notes, patient was living at home with home health aide x 2 and ambulating with a rolling walker. Will plan to confirm patient's level of independence with /. Patient confused, unable to provide social history.

## 2020-09-07 NOTE — PROGRESS NOTE ADULT - SUBJECTIVE AND OBJECTIVE BOX
Pt comfortable without complaints, pain controlled. Pt oriented x1, no evidence of SOB or distress.      Vital Signs Last 24 Hrs  T(C): 36.8 (07 Sep 2020 08:30), Max: 37.2 (06 Sep 2020 20:52)  T(F): 98.2 (07 Sep 2020 08:30), Max: 98.9 (06 Sep 2020 20:52)  HR: 94 (07 Sep 2020 08:30) (64 - 102)  BP: 132/61 (07 Sep 2020 08:30) (122/73 - 167/90)  BP(mean): 95 (07 Sep 2020 05:19) (84 - 116)  RR: 18 (07 Sep 2020 08:30) (14 - 34)  SpO2: 96% (07 Sep 2020 08:30) (85% - 100%)      Physical Exam:  General: Resting comfortably in bed watching TV. NAD. Oriented to self.   Extremities:        LLE: Prevena in place w/ good suction. Abduction pillow in place. SILT L2-S1 distribution, symmetric. Spontaneously firing quad, TA/EHL/FHL/GS.        RLE: No gross deformity. SILT L2-S1 distribution, symmetric. TA/EHL/FHL/GS motor intact. WWP, DP 2+                 Labs:                        13.0   9.72  )-----------( 203      ( 07 Sep 2020 07:06 )             39.5           09-07    146<H>  |  108  |  59<H>  ----------------------------<  123<H>  4.7   |  20<L>  |  4.64<H>    Ca    8.1<L>      07 Sep 2020 07:06        A/P: 86yMale POD#1 s/p L hip chaparro revision w/ long stem and cerclage wires 9/6. Of note pt had 5U transfused in OR. PACU and AM CBC stable.  - Stable  - Pain Control  - DVT ppx: ASA 81 BID  - Post op abx: Ancef x 24 hr, followed by cefpodoxime x 7d (200 q24h renal adjustment)  - Leave Hopper in at least 4-5 days per urology recs  - Enhanced supervision 1-1- pt pulls at lines and hopper  - PT, WBS: WBAT w/ posterior precautions    Ortho Pager [phone]

## 2020-09-07 NOTE — PROGRESS NOTE ADULT - ASSESSMENT
86M with Alzheimer's dementia, Prostate CA in past, HTN, hyperlipidemia, Squamous Cell Carcinoma, CKD, brought in by aide secondary to difficulty ambulating and bearing weight on L hip x 2 days, found to have a periprosthetic fracture, for OR 9/6.  Medicine consult for preoperative evaluation    #Periprosthetic fx s/p L hip chaparro revision 9/6/20: continue pain control and PT, abx ppx per primary team  #BPH w/ traumatic hopper placement postop: Urology following, continue to monitor urine output.  Hopper management per urology  #CKD: Stable, continue home bicarb  #HLD/HTN: Continue statin and amlodipine  #Anemia: s/p 5 units for due to procedure, now stable.  Continue to monitor  #Dementia: Continue memantine, delirium precaution, 1:1  #DVT PPx: per primary team  #Dispo: Pending PT, likely return to home with home care . 86M with Alzheimer's dementia, Prostate CA in past, HTN, hyperlipidemia, Squamous Cell Carcinoma, CKD, brought in by aide secondary to difficulty ambulating and bearing weight on L hip x 2 days, found to have a periprosthetic fracture, for OR 9/6.  Medicine consult for preoperative evaluation    #Periprosthetic fx s/p L hip chaparro revision 9/6/20: continue pain control and PT, abx ppx per primary team, and start bowel regimen  #BPH w/ traumatic hopper placement postop: Urology following, continue to monitor urine output.  Hopper management per urology  #CKD: Stable, continue home bicarb  #HLD/HTN: Continue statin and amlodipine  #Anemia: s/p 5 units for due to procedure, now stable.  Continue to monitor  #Dementia: Continue memantine, delirium precaution, 1:1  #DVT PPx: per primary team  #Dispo: Pending PT, likely return to home with home care .

## 2020-09-08 LAB
ANION GAP SERPL CALC-SCNC: 13 MMOL/L — SIGNIFICANT CHANGE UP (ref 5–17)
BUN SERPL-MCNC: 58 MG/DL — HIGH (ref 7–23)
CALCIUM SERPL-MCNC: 8.2 MG/DL — LOW (ref 8.4–10.5)
CHLORIDE SERPL-SCNC: 109 MMOL/L — HIGH (ref 96–108)
CO2 SERPL-SCNC: 20 MMOL/L — LOW (ref 22–31)
CREAT SERPL-MCNC: 4.54 MG/DL — HIGH (ref 0.5–1.3)
GLUCOSE SERPL-MCNC: 148 MG/DL — HIGH (ref 70–99)
HCT VFR BLD CALC: 30.3 % — LOW (ref 39–50)
HGB BLD-MCNC: 10 G/DL — LOW (ref 13–17)
MCHC RBC-ENTMCNC: 30.7 PG — SIGNIFICANT CHANGE UP (ref 27–34)
MCHC RBC-ENTMCNC: 33 GM/DL — SIGNIFICANT CHANGE UP (ref 32–36)
MCV RBC AUTO: 92.9 FL — SIGNIFICANT CHANGE UP (ref 80–100)
NRBC # BLD: 0 /100 WBCS — SIGNIFICANT CHANGE UP (ref 0–0)
PLATELET # BLD AUTO: 156 K/UL — SIGNIFICANT CHANGE UP (ref 150–400)
POTASSIUM SERPL-MCNC: 4.2 MMOL/L — SIGNIFICANT CHANGE UP (ref 3.5–5.3)
POTASSIUM SERPL-SCNC: 4.2 MMOL/L — SIGNIFICANT CHANGE UP (ref 3.5–5.3)
RBC # BLD: 3.26 M/UL — LOW (ref 4.2–5.8)
RBC # FLD: 15.2 % — HIGH (ref 10.3–14.5)
SODIUM SERPL-SCNC: 142 MMOL/L — SIGNIFICANT CHANGE UP (ref 135–145)
WBC # BLD: 7.73 K/UL — SIGNIFICANT CHANGE UP (ref 3.8–10.5)
WBC # FLD AUTO: 7.73 K/UL — SIGNIFICANT CHANGE UP (ref 3.8–10.5)

## 2020-09-08 PROCEDURE — 99233 SBSQ HOSP IP/OBS HIGH 50: CPT

## 2020-09-08 RX ADMIN — Medication 650 MILLIGRAM(S): at 13:34

## 2020-09-08 RX ADMIN — Medication 650 MILLIGRAM(S): at 21:56

## 2020-09-08 RX ADMIN — ATORVASTATIN CALCIUM 20 MILLIGRAM(S): 80 TABLET, FILM COATED ORAL at 21:56

## 2020-09-08 RX ADMIN — AMLODIPINE BESYLATE 2.5 MILLIGRAM(S): 2.5 TABLET ORAL at 06:49

## 2020-09-08 RX ADMIN — Medication 650 MILLIGRAM(S): at 00:57

## 2020-09-08 RX ADMIN — Medication 650 MILLIGRAM(S): at 07:16

## 2020-09-08 RX ADMIN — Medication 650 MILLIGRAM(S): at 11:27

## 2020-09-08 RX ADMIN — TAMSULOSIN HYDROCHLORIDE 0.4 MILLIGRAM(S): 0.4 CAPSULE ORAL at 21:56

## 2020-09-08 RX ADMIN — Medication 650 MILLIGRAM(S): at 12:12

## 2020-09-08 RX ADMIN — Medication 650 MILLIGRAM(S): at 17:26

## 2020-09-08 RX ADMIN — Medication 650 MILLIGRAM(S): at 06:49

## 2020-09-08 RX ADMIN — Medication 650 MILLIGRAM(S): at 01:15

## 2020-09-08 RX ADMIN — Medication 200 MILLIGRAM(S): at 11:27

## 2020-09-08 RX ADMIN — PANTOPRAZOLE SODIUM 40 MILLIGRAM(S): 20 TABLET, DELAYED RELEASE ORAL at 06:49

## 2020-09-08 RX ADMIN — Medication 81 MILLIGRAM(S): at 17:25

## 2020-09-08 RX ADMIN — Medication 81 MILLIGRAM(S): at 06:49

## 2020-09-08 RX ADMIN — MEMANTINE HYDROCHLORIDE 5 MILLIGRAM(S): 10 TABLET ORAL at 11:27

## 2020-09-08 NOTE — OCCUPATIONAL THERAPY INITIAL EVALUATION ADULT - ADDITIONAL COMMENTS
Patient confused. Per previous notes, patient was living at home with home health aide x 2 and ambulating with a rolling walker.

## 2020-09-08 NOTE — PROGRESS NOTE ADULT - SUBJECTIVE AND OBJECTIVE BOX
INTERVAL HPI/OVERNIGHT EVENTS: RONALDO O/N    SUBJECTIVE: Patient seen and examined at bedside.   Patient oriented to self only. Unable to obtain full ROS due to mental status  Denies any chest pain, dyspnea, nausea, abd pain. Denies pain in Hip/groin    OBJECTIVE:    VITAL SIGNS:  ICU Vital Signs Last 24 Hrs  T(C): 36.6 (08 Sep 2020 16:01), Max: 37.1 (08 Sep 2020 08:43)  T(F): 97.8 (08 Sep 2020 16:01), Max: 98.8 (08 Sep 2020 08:43)  HR: 78 (08 Sep 2020 16:01) (78 - 92)  BP: 135/69 (08 Sep 2020 16:01) (130/73 - 138/66)  BP(mean): 91 (08 Sep 2020 16:01) (91 - 91)  ABP: --  ABP(mean): --  RR: 18 (08 Sep 2020 16:01) (18 - 19)  SpO2: 96% (08 Sep 2020 16:01) (96% - 97%)        09-07 @ 07:01  -  09-08 @ 07:00  --------------------------------------------------------  IN: 1200 mL / OUT: 600 mL / NET: 600 mL    09-08 @ 07:01  -  09-08 @ 17:08  --------------------------------------------------------  IN: 0 mL / OUT: 450 mL / NET: -450 mL      CAPILLARY BLOOD GLUCOSE          PHYSICAL EXAM:  GEN: Male in NAD on RA  HEENT: NC/AT, MMM  CV: RRR, nml S1S2  PULM: nml effort, CTAB,   ABD: soft, NABS, non-tender  NEURO: Alert, oriented to self only. moving all extremities. LLE limited by pain. EOMI.   PSYCH: perseverating but redirectable.    MEDICATIONS:  MEDICATIONS  (STANDING):  acetaminophen   Tablet .. 650 milliGRAM(s) Oral every 6 hours  amLODIPine   Tablet 2.5 milliGRAM(s) Oral daily  aspirin enteric coated 81 milliGRAM(s) Oral two times a day  atorvastatin 20 milliGRAM(s) Oral at bedtime  BUpivacaine liposome 1.3% Injectable (no eMAR) 20 milliLiter(s) Local Injection once  cefpodoxime 200 milliGRAM(s) Oral daily  lactated ringers. 1000 milliLiter(s) (100 mL/Hr) IV Continuous <Continuous>  memantine 5 milliGRAM(s) Oral daily  pantoprazole    Tablet 40 milliGRAM(s) Oral before breakfast  sodium bicarbonate 650 milliGRAM(s) Oral three times a day  tamsulosin 0.4 milliGRAM(s) Oral at bedtime    MEDICATIONS  (PRN):  HYDROmorphone  Injectable 0.5 milliGRAM(s) IV Push every 15 minutes PRN Severe Pain (7 - 10)  ondansetron Injectable 4 milliGRAM(s) IV Push every 6 hours PRN Nausea and/or Vomiting  traMADol 25 milliGRAM(s) Oral every 6 hours PRN Severe Pain (7 - 10)      ALLERGIES:  Allergies    penicillins (Rash)    Intolerances        LABS:                        10.0   7.73  )-----------( 156      ( 08 Sep 2020 05:59 )             30.3     09-08    142  |  109<H>  |  58<H>  ----------------------------<  148<H>  4.2   |  20<L>  |  4.54<H>    Ca    8.2<L>      08 Sep 2020 05:59            RADIOLOGY & ADDITIONAL TESTS: Reviewed.

## 2020-09-08 NOTE — PROGRESS NOTE ADULT - ASSESSMENT
85M w h/o Alzheimer's dementia (oriented to self), BPH, Prostate CA, CKDIV (baseline Cr in 3s), HTN, BPH, w recent LEFT hemiarthroplasty w Dr. Hall readmitted post fall found to have periprosthetic fx now s/p revision TERRELL w Dr. Eduardo 9/6 c/b urinary retention     #Post-op state. Pain currently controlled. On bowel regimen and IS at bedside. PPx: ASA BID. DISPO: DOMINGO however pt's family refusing and prefers [facility] w aides  #CKD-IV - cr improving. Remains above baseline 3.4 however on last DC baseline in 4.   #Normocytic anemia - 10 from 13 (s/p multiple units in OR). was 6.7 on admission. Appears asymptomatic. Would follow clinically  #Urinary retention - urology called. Casas in place  #HTN - at target. c/w amlodipine 2.5  #Alzheimer's dementia - chronic. oriented to self at baseline. c/w memantine  #BPH - chronic. w retention above. c/w home flomax    Recommendations  --F/U CBC w diff in AM  --Mobilize w PT, OOB to chair    DISPO: HPT w aides since pt's family declining DOMINGO

## 2020-09-08 NOTE — OCCUPATIONAL THERAPY INITIAL EVALUATION ADULT - IMPAIRMENTS CONTRIBUTING IMPAIRED BED MOBILITY, REHAB EVAL
decreased strength/impaired coordination/decreased flexibility/scissoring/impaired balance/cognition

## 2020-09-08 NOTE — PROGRESS NOTE ADULT - SUBJECTIVE AND OBJECTIVE BOX
SUBJECTIVE: Patient seen and examined. @630  Pain controlled.  No f/c/n/v/cp/sob.    OBJECTIVE:  NAD, A+O x1 (only to self)  Vital Signs Last 24 Hrs  T(C): 36.8 (08 Sep 2020 06:14), Max: 37 (07 Sep 2020 16:56)  T(F): 98.3 (08 Sep 2020 06:14), Max: 98.6 (07 Sep 2020 16:56)  HR: 87 (08 Sep 2020 06:14) (86 - 94)  BP: 137/76 (08 Sep 2020 06:14) (126/62 - 140/69)  BP(mean): --  RR: 18 (08 Sep 2020 06:14) (17 - 19)  SpO2: 97% (08 Sep 2020 06:14) (96% - 97%)    Affected extremity:          Prevena: maintaining suction         Sensation: SILT         Motor exam: firing TA/GS/EHL         warm well perfused; capillary refill <3 seconds                         10.0   7.73  )-----------( 156      ( 08 Sep 2020 05:59 )             30.3     09-08    142  |  109<H>  |  58<H>  ----------------------------<  148<H>  4.2   |  20<L>  |  4.54<H>    Ca    8.2<L>      08 Sep 2020 05:59      A/P :  Pt is a 87yo Male s/p  L revision TERRELL 9/6  -    Pain control  -    DVT ppx: SCD/ASA     -    Weight bearing status: WBAT   -    Physical Therapy  -    Appreciate Urology recs  -    Dispo: DOMINGO Rivera MD  Chief Orthopaedic Resident  Orthopaedic Surgery

## 2020-09-08 NOTE — OCCUPATIONAL THERAPY INITIAL EVALUATION ADULT - PERTINENT HX OF CURRENT PROBLEM, REHAB EVAL
85M with Alzheimer's dementia, Prostate CA in past, HTN, hyperlipidemia, Squamous Cell Carcinoma, CKD (baseline Cr 3.04, based off of previous charts), brought in by aide secondary to difficulty ambulating and bearing weight on L hip x 2 days. Patient s/p L FNF treated w/ hemiarthroplasty on 8/19, now found to have Alexander B periprosthetic fracture.

## 2020-09-08 NOTE — PROGRESS NOTE ADULT - SUBJECTIVE AND OBJECTIVE BOX
Orthopaedic Surgery Progress Note    S/p L hip chaparro 8/19 for FNP now POD 2 s/p L revision stem and cerclage wiring for periprosthetic fracture    Subjective:     Patient seen and examined. Patient denies pain. Unable to answer any other questions secondary to dementia.    Objective:    Vital Signs Last 24 Hrs  T(C): 36.8 (09-08-20 @ 12:00), Max: 37.1 (09-08-20 @ 08:43)  T(F): 98.2 (09-08-20 @ 12:00), Max: 98.8 (09-08-20 @ 08:43)  HR: 81 (09-08-20 @ 12:00) (81 - 92)  BP: 134/63 (09-08-20 @ 12:00) (130/73 - 134/96)  RR: 18 (09-08-20 @ 12:00) (18 - 18)  SpO2: 97% (09-08-20 @ 12:00) (96% - 97%)  AVSS    PE:  General: Patient alert and oriented, NAD  Dressing: Clean/dry/intact prevena L hip  Pulses: 2+ DP BLE   Sensation: SILT BLE   Motor: firing feet and ankles spontaneously, but unable to assess strength                          10.0   7.73  )-----------( 156      ( 08 Sep 2020 05:59 )             30.3   08 Sep 2020 05:59    142    |  109    |  58     ----------------------------<  148    4.2     |  20     |  4.54           A/P: 86yMale L hip chaparro 8/19 for FNP now POD 2 s/p L revision stem and cerclage wiring for periprosthetic fracture  1. Pain control as needed  2. DVT prophylaxis: ASA  3. PT, weight-bearing status: WBAT   4. Continue hopper per Urology recs  5. Dispo: Acute Rehab     Ortho Pager [phone] Orthopaedic Surgery Progress Note    S/p L hip chaparro 8/19 for FNF now POD 2 s/p L revision stem and cerclage wiring for periprosthetic fracture    Subjective:     Patient seen and examined. Patient denies pain. Unable to answer any other questions secondary to dementia.    Objective:    Vital Signs Last 24 Hrs  T(C): 36.8 (09-08-20 @ 12:00), Max: 37.1 (09-08-20 @ 08:43)  T(F): 98.2 (09-08-20 @ 12:00), Max: 98.8 (09-08-20 @ 08:43)  HR: 81 (09-08-20 @ 12:00) (81 - 92)  BP: 134/63 (09-08-20 @ 12:00) (130/73 - 134/96)  RR: 18 (09-08-20 @ 12:00) (18 - 18)  SpO2: 97% (09-08-20 @ 12:00) (96% - 97%)  AVSS    PE:  General: Patient alert and oriented, NAD  Dressing: Clean/dry/intact prevena L hip  Pulses: 2+ DP BLE   Sensation: SILT BLE   Motor: firing feet and ankles spontaneously, but unable to assess strength                          10.0   7.73  )-----------( 156      ( 08 Sep 2020 05:59 )             30.3   08 Sep 2020 05:59    142    |  109    |  58     ----------------------------<  148    4.2     |  20     |  4.54           A/P: 86yMale L hip chaparro 8/19 for FNF now POD 2 s/p L revision stem and cerclage wiring for periprosthetic fracture  1. Pain control as needed  2. DVT prophylaxis: ASA  3. PT, weight-bearing status: WBAT   4. Continue hopper per Urology recs  5. Dispo: Acute Rehab     Ortho Pager [phone] Orthopaedic Surgery Progress Note    S/p L hip chaparro 8/19 for FNF now POD 2 s/p L revision stem and cerclage wiring for periprosthetic fracture    Subjective:     Patient seen and examined. Patient denies pain. Unable to answer any other questions secondary to dementia.    Objective:    Vital Signs Last 24 Hrs  T(C): 36.8 (09-08-20 @ 12:00), Max: 37.1 (09-08-20 @ 08:43)  T(F): 98.2 (09-08-20 @ 12:00), Max: 98.8 (09-08-20 @ 08:43)  HR: 81 (09-08-20 @ 12:00) (81 - 92)  BP: 134/63 (09-08-20 @ 12:00) (130/73 - 134/96)  RR: 18 (09-08-20 @ 12:00) (18 - 18)  SpO2: 97% (09-08-20 @ 12:00) (96% - 97%)  AVSS    PE:  General: Patient sitting comfortable in bed, NAD  Dressing: Clean/dry/intact prevena L hip  Pulses: 2+ DP BLE   Sensation: SILT BLE   Motor: firing feet and ankles spontaneously, but unable to assess strength                          10.0   7.73  )-----------( 156      ( 08 Sep 2020 05:59 )             30.3   08 Sep 2020 05:59    142    |  109    |  58     ----------------------------<  148    4.2     |  20     |  4.54           A/P: 86yMale L hip chaparro 8/19 for FNF now POD 2 s/p L revision stem and cerclage wiring for periprosthetic fracture  1. Pain control as needed  2. DVT prophylaxis: ASA  3. PT, weight-bearing status: WBAT   4. Continue hopper per Urology recs  5. Dispo: Acute Rehab     Ortho Pager [phone]

## 2020-09-08 NOTE — OCCUPATIONAL THERAPY INITIAL EVALUATION ADULT - IMPAIRED TRANSFERS: SIT/STAND, REHAB EVAL
decreased flexibility/decreased ROM/impaired coordination/decreased strength/impaired balance/cognition

## 2020-09-09 ENCOUNTER — TRANSCRIPTION ENCOUNTER (OUTPATIENT)
Age: 85
End: 2020-09-09

## 2020-09-09 LAB
ANION GAP SERPL CALC-SCNC: 12 MMOL/L — SIGNIFICANT CHANGE UP (ref 5–17)
BUN SERPL-MCNC: 60 MG/DL — HIGH (ref 7–23)
CALCIUM SERPL-MCNC: 8.2 MG/DL — LOW (ref 8.4–10.5)
CHLORIDE SERPL-SCNC: 107 MMOL/L — SIGNIFICANT CHANGE UP (ref 96–108)
CO2 SERPL-SCNC: 21 MMOL/L — LOW (ref 22–31)
CREAT SERPL-MCNC: 4.31 MG/DL — HIGH (ref 0.5–1.3)
GLUCOSE SERPL-MCNC: 101 MG/DL — HIGH (ref 70–99)
HCT VFR BLD CALC: 30.3 % — LOW (ref 39–50)
HGB BLD-MCNC: 9.9 G/DL — LOW (ref 13–17)
MCHC RBC-ENTMCNC: 30.4 PG — SIGNIFICANT CHANGE UP (ref 27–34)
MCHC RBC-ENTMCNC: 32.7 GM/DL — SIGNIFICANT CHANGE UP (ref 32–36)
MCV RBC AUTO: 92.9 FL — SIGNIFICANT CHANGE UP (ref 80–100)
NRBC # BLD: 0 /100 WBCS — SIGNIFICANT CHANGE UP (ref 0–0)
PLATELET # BLD AUTO: 159 K/UL — SIGNIFICANT CHANGE UP (ref 150–400)
POTASSIUM SERPL-MCNC: 4.4 MMOL/L — SIGNIFICANT CHANGE UP (ref 3.5–5.3)
POTASSIUM SERPL-SCNC: 4.4 MMOL/L — SIGNIFICANT CHANGE UP (ref 3.5–5.3)
RBC # BLD: 3.26 M/UL — LOW (ref 4.2–5.8)
RBC # FLD: 14.6 % — HIGH (ref 10.3–14.5)
SODIUM SERPL-SCNC: 140 MMOL/L — SIGNIFICANT CHANGE UP (ref 135–145)
WBC # BLD: 7.24 K/UL — SIGNIFICANT CHANGE UP (ref 3.8–10.5)
WBC # FLD AUTO: 7.24 K/UL — SIGNIFICANT CHANGE UP (ref 3.8–10.5)

## 2020-09-09 PROCEDURE — 99232 SBSQ HOSP IP/OBS MODERATE 35: CPT

## 2020-09-09 RX ADMIN — Medication 650 MILLIGRAM(S): at 01:06

## 2020-09-09 RX ADMIN — Medication 650 MILLIGRAM(S): at 01:26

## 2020-09-09 RX ADMIN — PANTOPRAZOLE SODIUM 40 MILLIGRAM(S): 20 TABLET, DELAYED RELEASE ORAL at 06:11

## 2020-09-09 RX ADMIN — MEMANTINE HYDROCHLORIDE 5 MILLIGRAM(S): 10 TABLET ORAL at 13:19

## 2020-09-09 RX ADMIN — Medication 650 MILLIGRAM(S): at 14:42

## 2020-09-09 RX ADMIN — Medication 650 MILLIGRAM(S): at 13:19

## 2020-09-09 RX ADMIN — ATORVASTATIN CALCIUM 20 MILLIGRAM(S): 80 TABLET, FILM COATED ORAL at 21:36

## 2020-09-09 RX ADMIN — Medication 81 MILLIGRAM(S): at 06:10

## 2020-09-09 RX ADMIN — Medication 200 MILLIGRAM(S): at 13:19

## 2020-09-09 RX ADMIN — Medication 650 MILLIGRAM(S): at 19:00

## 2020-09-09 RX ADMIN — Medication 650 MILLIGRAM(S): at 14:00

## 2020-09-09 RX ADMIN — Medication 650 MILLIGRAM(S): at 18:22

## 2020-09-09 RX ADMIN — AMLODIPINE BESYLATE 2.5 MILLIGRAM(S): 2.5 TABLET ORAL at 06:11

## 2020-09-09 RX ADMIN — Medication 650 MILLIGRAM(S): at 06:11

## 2020-09-09 RX ADMIN — Medication 650 MILLIGRAM(S): at 21:36

## 2020-09-09 RX ADMIN — Medication 81 MILLIGRAM(S): at 18:22

## 2020-09-09 RX ADMIN — Medication 650 MILLIGRAM(S): at 07:22

## 2020-09-09 RX ADMIN — TAMSULOSIN HYDROCHLORIDE 0.4 MILLIGRAM(S): 0.4 CAPSULE ORAL at 21:37

## 2020-09-09 RX ADMIN — Medication 650 MILLIGRAM(S): at 06:45

## 2020-09-09 NOTE — DISCHARGE NOTE PROVIDER - HOSPITAL COURSE
Admitted    Medicine Consult    Urology consult    Surgery Revision Left THR    Ashley-op Antibiotics    Pain control    DVT prophylaxis    OOB/Physical Therapy Admitted    Medicine Consult    Urology consult    Surgery Revision Left THR    Ashley-op Antibiotics    Pain control    DVT prophylaxis    OOB/Physical Therapy    Left leg ultrasound - no evidence of DVT proximal to the knee

## 2020-09-09 NOTE — DISCHARGE NOTE PROVIDER - NSDCHHPTASSISTHOME_GEN_ALL_CORE
[facility]  [address] [facility] [address]  [phone]        5/15/2018      Keesha Sanchez      This is to certify that the above named patient had an appointment at this office for professional attention on 5/15/2015.      Please excuse him/her:  (X)    FROM:   (X)    DUE TO:        Work      Injury   x    School      Illness       Gym  x    Other       Other         Comments      Thank you,       SIGNATURE:____________________________________________________                                                                  Penny Worthington LPC, SAC        
Patient Needs Assistance to Leave Residence...

## 2020-09-09 NOTE — PROGRESS NOTE ADULT - SUBJECTIVE AND OBJECTIVE BOX
SUBJECTIVE: Patient seen and examined. @630  Pain controlled.  No f/c/n/v/cp/sob.    OBJECTIVE:  NAD, A+O x1 (only to self)    Vital Signs Last 24 Hrs  T(C): 36.7 (09 Sep 2020 06:04), Max: 37.6 (09 Sep 2020 00:22)  T(F): 98 (09 Sep 2020 06:04), Max: 99.7 (09 Sep 2020 00:22)  HR: 91 (09 Sep 2020 06:04) (78 - 92)  BP: 116/68 (09 Sep 2020 06:04) (116/68 - 135/69)  BP(mean): 91 (08 Sep 2020 16:01) (91 - 91)  RR: 18 (09 Sep 2020 06:04) (18 - 18)  SpO2: 98% (09 Sep 2020 06:04) (96% - 98%)    Affected extremity:          Prevena: maintaining suction         Sensation: SILT         Motor exam: firing TA/GS/EHL         warm well perfused; capillary refill <3 seconds                                     9.9    7.24  )-----------( 159      ( 09 Sep 2020 07:20 )             30.3       A/P :  Pt is a 85yo Male s/p  L revision TERRELL 9/6  -    Pain control  -    DVT ppx: SCD/ASA     -    Weight bearing status: WBAT   -    Physical Therapy  -    Appreciate Urology recs  -    Dispo: DOMINGO Rivera MD  Chief Orthopaedic Resident  Orthopaedic Surgery SUBJECTIVE: Patient seen and examined. @630  Pain controlled.  No f/c/n/v/cp/sob.    OBJECTIVE:  NAD, A+O x1 (only to self)    Vital Signs Last 24 Hrs  T(C): 36.7 (09 Sep 2020 06:04), Max: 37.6 (09 Sep 2020 00:22)  T(F): 98 (09 Sep 2020 06:04), Max: 99.7 (09 Sep 2020 00:22)  HR: 91 (09 Sep 2020 06:04) (78 - 92)  BP: 116/68 (09 Sep 2020 06:04) (116/68 - 135/69)  BP(mean): 91 (08 Sep 2020 16:01) (91 - 91)  RR: 18 (09 Sep 2020 06:04) (18 - 18)  SpO2: 98% (09 Sep 2020 06:04) (96% - 98%)    Affected extremity:          Prevena: maintaining suction         Sensation: SILT         Motor exam: firing TA/GS/EHL         warm well perfused; capillary refill <3 seconds                                     9.9    7.24  )-----------( 159      ( 09 Sep 2020 07:20 )             30.3       A/P :  Pt is a 87yo Male s/p  L revision hemiarthroplasty 9/6  -    Pain control  -    DVT ppx: SCD/ASA     -    Weight bearing status: WBAT   -    Posterior hip precautions, fall precautions  -    Physical Therapy  -    Appreciate Urology recs  -    Dispo: DOMINGO Rivera MD  Chief Orthopaedic Resident  Orthopaedic Surgery

## 2020-09-09 NOTE — DISCHARGE NOTE PROVIDER - NSDCHHNEEDSERVICE_GEN_ALL_CORE
Medication teaching and assessment/Observation and assessment/Rehabilitation services/Other, specify.../Wound care and assessment/Teaching and training

## 2020-09-09 NOTE — DISCHARGE NOTE PROVIDER - NSDCMRMEDTOKEN_GEN_ALL_CORE_FT
amLODIPine 2.5 mg oral tablet: 1 tab(s) orally once a day  Aspirin Enteric Coated 325 mg oral delayed release tablet: 1 tab(s) orally 2 times a day MDD:2  atorvastatin 20 mg oral tablet: 1 tab(s) orally once a day  cefpodoxime 200 mg oral tablet: 1 tab(s) orally 2 times a day   clotrimazole 1% topical cream: 1 application topically every 12 hours  memantine 7 mg oral capsule, extended release: 1 cap(s) orally once a day  Protonix 40 mg oral delayed release tablet: 1 tab(s) orally once a day   sodium bicarbonate 650 mg oral tablet: 1 tab(s) orally 3 times a day  tamsulosin 0.4 mg oral capsule: 1 cap(s) orally once a day (at bedtime)  traMADol 50 mg oral tablet: 1 tab(s) orally every 6 hours MDD:4 acetaminophen 325 mg oral tablet: 2 tab(s) orally every 6 hours as needed for pain / fevers  amLODIPine 2.5 mg oral tablet: 1 tab(s) orally once a day  Aspirin Enteric Coated 325 mg oral delayed release tablet: 1 tab(s) orally 2 times a day MDD:2  atorvastatin 20 mg oral tablet: 1 tab(s) orally once a day  cefpodoxime 200 mg oral tablet: 1 tab(s) orally every 12 hours x 7 days (last dose on 9/17)  memantine 7 mg oral capsule, extended release: 1 cap(s) orally once a day  Protonix 40 mg oral delayed release tablet: 1 tab(s) orally once a day   sodium bicarbonate 650 mg oral tablet: 1 tab(s) orally 3 times a day  tamsulosin 0.4 mg oral capsule: 1 cap(s) orally once a day (at bedtime)

## 2020-09-09 NOTE — DISCHARGE NOTE PROVIDER - CARE PROVIDER_API CALL
Les Eduardo)  Orthopedics  [address] [place] [address]  Phone: [phone]  Fax: [phone]  Follow Up Time: 2 weeks Les Eduardo)  Orthopedics  [address] [place] [address]  Phone: [phone]  Fax: [phone]  Follow Up Time: 2 weeks    Troy Linda  UROLOGY  [address] [address]  Phone: [phone]  Fax: [phone]  Follow Up Time: 2 weeks

## 2020-09-09 NOTE — PROGRESS NOTE ADULT - ASSESSMENT
85M w h/o Alzheimer's dementia (oriented to self), BPH, Prostate CA, CKDIV (baseline Cr in 3s), HTN, BPH, w recent LEFT hemiarthroplasty w Dr. Hall readmitted post fall found to have periprosthetic fx now s/p revision TERRELL w Dr. Eduardo 9/6 c/b urinary retention     #Post-op state. Pain currently controlled. On bowel regimen and IS at bedside. PPx: ASA BID. DISPO: DOMINGO -- referrals have been placed  #CKD-IV - cr improving. 4.3 from 4.5. Remains above baseline 3.4 however on last DC baseline in 4.   #Normocytic anemia - stable 9.9 from 10 from 13 (s/p multiple units in OR). was 6.7 on admission. Appears asymptomatic. Would follow clinically  #Urinary retention - urology called. Casas in place  #HTN - at target. c/w amlodipine 2.5  #Alzheimer's dementia - chronic. oriented to self at baseline. c/w memantine  #BPH - chronic. w retention above. c/w home flomax    Recommendations  --Encourage optimization of sleep wake cycle - PT, OOB to chair, eating during day to encourage pt to be more rested for bed at nighttime  --Reassess mental status in afternoon. appears non-toxic, afebrile wo lab abnormalities  --Mobilize w PT, OOB to chair    DISPO: HPT w aides since pt's family declining DOMINGO

## 2020-09-09 NOTE — DISCHARGE NOTE PROVIDER - NSDCFUADDINST_GEN_ALL_CORE_FT
Weight bear as tolerated with assistive device.  No strenuous activity, heavy lifting, driving or returning to work until cleared by MD.  You may shower - dressing is water-resistant, no soaking in bathtubs.  Remove dressing after post op day 5-7, then leave incision open to air. Keep incision clean and dry.  Try to have regular bowel movements, take stool softener or laxative if necessary.  Swelling may travel all the way down leg to foot, this is normal and will subside in a few weeks.  Call to schedule an appt with Dr. Eduardo for follow up, if you have staples or sutures they will be removed in office.  Contact your doctor if you experience: fever greater than 101.5, chills, chest pain, difficulty breathing, redness or excessive drainage around the incision, other concerns.  Follow up with your primary care provider. Weight bear as tolerated with assistive device.  No strenuous activity, heavy lifting, driving or returning to work until cleared by MD.  You may take showers. Keep the battery pack dry. You may disconnect the dressing from the battery pack prior to showers and keep away from water. To do this, hold the on/off button down until it turns off, close the clamp on the tubing, then disconnect the tubing from the battery pack. Do the reverse to turn it back on.  No soaking in bathtubs.  The dressing has a battery that usually dies in 7 days. Once this occurs, you may remove the dressing and dispose of it, then leave incision open to air. Keep incision clean and dry.   Try to have regular bowel movements, take stool softener or laxative if necessary.  Swelling may travel all the way down leg to foot, this is normal and will subside in a few weeks.  Call to schedule an appt with Dr. Eduardo for follow up, if you have staples or sutures they will be removed in office.  Contact your doctor if you experience: fever greater than 101.5, chills, chest pain, difficulty breathing, redness or excessive drainage around the incision, other concerns.  Follow up with your primary care provider.  Follow up with Urology to re-evaluate your urinary issues and decide whether to continue or remove the urinary catheter.

## 2020-09-09 NOTE — DISCHARGE NOTE PROVIDER - NSDCCPCAREPLAN_GEN_ALL_CORE_FT
PRINCIPAL DISCHARGE DIAGNOSIS  Diagnosis: Periprosth fracture around unsp internal prosth joint, init  Assessment and Plan of Treatment: improvement s/p Revision Left THR      SECONDARY DISCHARGE DIAGNOSES  Diagnosis: Hip hematoma, left  Assessment and Plan of Treatment: PRINCIPAL DISCHARGE DIAGNOSIS  Diagnosis: Periprosth fracture around unsp internal prosth joint, init  Assessment and Plan of Treatment: improvement s/p Revision Left THR      SECONDARY DISCHARGE DIAGNOSES  Diagnosis: Complication of Hopper catheter  Assessment and Plan of Treatment: traumatic hopper in OR - required repeat hopper under cystoscopy by Urology    Diagnosis: Hip hematoma, left  Assessment and Plan of Treatment:

## 2020-09-09 NOTE — DISCHARGE NOTE PROVIDER - NSDCCPTREATMENT_GEN_ALL_CORE_FT
PRINCIPAL PROCEDURE  Procedure: Revision, major, total hip arthroplasty  Findings and Treatment: left

## 2020-09-09 NOTE — DISCHARGE NOTE PROVIDER - PROVIDER TOKENS
PROVIDER:[TOKEN:[[id number]],FOLLOWUP:[2 weeks]] PROVIDER:[TOKEN:[[id number]],FOLLOWUP:[2 weeks]],PROVIDER:[TOKEN:[[id number]],FOLLOWUP:[2 weeks]]

## 2020-09-09 NOTE — PROGRESS NOTE ADULT - SUBJECTIVE AND OBJECTIVE BOX
Orthopaedic Surgery Progress Note    Post-operative day #3 s/p L hip chaparro 8/19 for FNF now POD 2 s/p L revision stem and cerclage wiring for periprosthetic fracture    Subjective:     Patient seen and examined. Very sleepy this morning. PCA at bedside.     Objective:    Vital Signs Last 24 Hrs  T(C): 36.7 (09-09-20 @ 08:56), Max: 36.7 (09-09-20 @ 06:04)  T(F): 98 (09-09-20 @ 08:56), Max: 98 (09-09-20 @ 06:04)  HR: 84 (09-09-20 @ 08:56) (84 - 91)  BP: 119/60 (09-09-20 @ 08:56) (116/68 - 119/60)  BP(mean): --  RR: 18 (09-09-20 @ 08:56) (18 - 18)  SpO2: 97% (09-09-20 @ 08:56) (97% - 98%)  AVSS    PE:  General: Patient somnolent, NAD  Dressing: Clean/dry/intact prevena left hip   Pulses: 2+ DP BLE   sensorimotor exam unable to be performed secondary to dementia                          9.9    7.24  )-----------( 159      ( 09 Sep 2020 07:20 )             30.3   09 Sep 2020 07:20    140    |  107    |  60     ----------------------------<  101    4.4     |  21     |  4.31     Ca    8.2        09 Sep 2020 07:20        A/P: 86yMale POD#3 s/p L hip chaparro 8/19 for FNF now POD 2 s/p L revision stem and cerclage wiring for periprosthetic fracture s/p   1. Pain control as needed  2. DVT prophylaxis: ASA   3. PT, weight-bearing status: WBAT    4. Dispo: DOMINGO vs home with 24 hour care    Ortho Pager [phone]

## 2020-09-09 NOTE — PROGRESS NOTE ADULT - SUBJECTIVE AND OBJECTIVE BOX
INTERVAL HPI/OVERNIGHT EVENTS: RONALDO O/N    SUBJECTIVE: Patient seen and examined at bedside.   Overnight noted to be up talking overnight. Fell asleep not long prior to my visiting w pt. Seen at 1000h  Pt arousable to voice and mumbling. Denying pain. Moving toes and performing handgrip on command. Unable to obtain full ROS due to mental status    OBJECTIVE:    VITAL SIGNS:  ICU Vital Signs Last 24 Hrs  T(C): 36.7 (09 Sep 2020 12:36), Max: 37.6 (09 Sep 2020 00:22)  T(F): 98.1 (09 Sep 2020 12:36), Max: 99.7 (09 Sep 2020 00:22)  HR: 92 (09 Sep 2020 12:36) (78 - 92)  BP: 123/72 (09 Sep 2020 12:36) (116/68 - 135/69)  BP(mean): 91 (08 Sep 2020 16:01) (91 - 91)  ABP: --  ABP(mean): --  RR: 19 (09 Sep 2020 12:36) (18 - 19)  SpO2: 99% (09 Sep 2020 12:36) (96% - 99%)        09-08 @ 07:01  -  09-09 @ 07:00  --------------------------------------------------------  IN: 1500 mL / OUT: 2275 mL / NET: -775 mL    09-09 @ 07:01  -  09-09 @ 14:53  --------------------------------------------------------  IN: 0 mL / OUT: 1400 mL / NET: -1400 mL      CAPILLARY BLOOD GLUCOSE          PHYSICAL EXAM:  GEN: Male in NAD on RA  HEENT: NC/AT, MMM  CV: RRR, nml S1S2  PULM: nml effort, CTAB,   ABD: soft, NABS, non-tender  NEURO: Asleep - arousable to voice. oriented to self only. moving all extremities. LLE limited by pain. EOMI.   PSYCH: perseverating but redirectable.      MEDICATIONS:  MEDICATIONS  (STANDING):  acetaminophen   Tablet .. 650 milliGRAM(s) Oral every 6 hours  amLODIPine   Tablet 2.5 milliGRAM(s) Oral daily  aspirin enteric coated 81 milliGRAM(s) Oral two times a day  atorvastatin 20 milliGRAM(s) Oral at bedtime  BUpivacaine liposome 1.3% Injectable (no eMAR) 20 milliLiter(s) Local Injection once  cefpodoxime 200 milliGRAM(s) Oral daily  lactated ringers. 1000 milliLiter(s) (100 mL/Hr) IV Continuous <Continuous>  memantine 5 milliGRAM(s) Oral daily  pantoprazole    Tablet 40 milliGRAM(s) Oral before breakfast  sodium bicarbonate 650 milliGRAM(s) Oral three times a day  tamsulosin 0.4 milliGRAM(s) Oral at bedtime    MEDICATIONS  (PRN):  HYDROmorphone  Injectable 0.5 milliGRAM(s) IV Push every 15 minutes PRN Severe Pain (7 - 10)  ondansetron Injectable 4 milliGRAM(s) IV Push every 6 hours PRN Nausea and/or Vomiting  traMADol 25 milliGRAM(s) Oral every 6 hours PRN Severe Pain (7 - 10)      ALLERGIES:  Allergies    penicillins (Rash)    Intolerances        LABS:                        9.9    7.24  )-----------( 159      ( 09 Sep 2020 07:20 )             30.3     09-09    140  |  107  |  60<H>  ----------------------------<  101<H>  4.4   |  21<L>  |  4.31<H>    Ca    8.2<L>      09 Sep 2020 07:20            RADIOLOGY & ADDITIONAL TESTS: Reviewed.

## 2020-09-09 NOTE — DISCHARGE NOTE PROVIDER - NSDCACTIVITY_GEN_ALL_CORE
Showering allowed/Stairs allowed/Walking - Indoors allowed/No heavy lifting/straining/Do not drive or operate machinery/Walking - Outdoors allowed Walking - Indoors allowed/No heavy lifting/straining/Showering allowed/Do not drive or operate machinery

## 2020-09-10 LAB
ANION GAP SERPL CALC-SCNC: 11 MMOL/L — SIGNIFICANT CHANGE UP (ref 5–17)
BUN SERPL-MCNC: 62 MG/DL — HIGH (ref 7–23)
CALCIUM SERPL-MCNC: 8.4 MG/DL — SIGNIFICANT CHANGE UP (ref 8.4–10.5)
CHLORIDE SERPL-SCNC: 108 MMOL/L — SIGNIFICANT CHANGE UP (ref 96–108)
CO2 SERPL-SCNC: 19 MMOL/L — LOW (ref 22–31)
CREAT SERPL-MCNC: 4.4 MG/DL — HIGH (ref 0.5–1.3)
GLUCOSE SERPL-MCNC: 106 MG/DL — HIGH (ref 70–99)
HCT VFR BLD CALC: 30.4 % — LOW (ref 39–50)
HGB BLD-MCNC: 9.7 G/DL — LOW (ref 13–17)
MCHC RBC-ENTMCNC: 30.3 PG — SIGNIFICANT CHANGE UP (ref 27–34)
MCHC RBC-ENTMCNC: 31.9 GM/DL — LOW (ref 32–36)
MCV RBC AUTO: 95 FL — SIGNIFICANT CHANGE UP (ref 80–100)
NRBC # BLD: 0 /100 WBCS — SIGNIFICANT CHANGE UP (ref 0–0)
PLATELET # BLD AUTO: 184 K/UL — SIGNIFICANT CHANGE UP (ref 150–400)
POTASSIUM SERPL-MCNC: 4.9 MMOL/L — SIGNIFICANT CHANGE UP (ref 3.5–5.3)
POTASSIUM SERPL-SCNC: 4.9 MMOL/L — SIGNIFICANT CHANGE UP (ref 3.5–5.3)
RBC # BLD: 3.2 M/UL — LOW (ref 4.2–5.8)
RBC # FLD: 14.4 % — SIGNIFICANT CHANGE UP (ref 10.3–14.5)
SODIUM SERPL-SCNC: 138 MMOL/L — SIGNIFICANT CHANGE UP (ref 135–145)
WBC # BLD: 6.58 K/UL — SIGNIFICANT CHANGE UP (ref 3.8–10.5)
WBC # FLD AUTO: 6.58 K/UL — SIGNIFICANT CHANGE UP (ref 3.8–10.5)

## 2020-09-10 PROCEDURE — 99233 SBSQ HOSP IP/OBS HIGH 50: CPT

## 2020-09-10 RX ORDER — ONDANSETRON 8 MG/1
4 TABLET, FILM COATED ORAL EVERY 6 HOURS
Refills: 0 | Status: DISCONTINUED | OUTPATIENT
Start: 2020-09-10 | End: 2020-09-12

## 2020-09-10 RX ADMIN — Medication 650 MILLIGRAM(S): at 13:47

## 2020-09-10 RX ADMIN — ATORVASTATIN CALCIUM 20 MILLIGRAM(S): 80 TABLET, FILM COATED ORAL at 22:01

## 2020-09-10 RX ADMIN — Medication 650 MILLIGRAM(S): at 06:40

## 2020-09-10 RX ADMIN — AMLODIPINE BESYLATE 2.5 MILLIGRAM(S): 2.5 TABLET ORAL at 06:40

## 2020-09-10 RX ADMIN — TAMSULOSIN HYDROCHLORIDE 0.4 MILLIGRAM(S): 0.4 CAPSULE ORAL at 22:01

## 2020-09-10 RX ADMIN — Medication 81 MILLIGRAM(S): at 18:25

## 2020-09-10 RX ADMIN — Medication 81 MILLIGRAM(S): at 06:40

## 2020-09-10 RX ADMIN — Medication 650 MILLIGRAM(S): at 19:23

## 2020-09-10 RX ADMIN — Medication 650 MILLIGRAM(S): at 08:53

## 2020-09-10 RX ADMIN — PANTOPRAZOLE SODIUM 40 MILLIGRAM(S): 20 TABLET, DELAYED RELEASE ORAL at 06:40

## 2020-09-10 RX ADMIN — Medication 650 MILLIGRAM(S): at 22:01

## 2020-09-10 RX ADMIN — Medication 650 MILLIGRAM(S): at 13:46

## 2020-09-10 RX ADMIN — Medication 200 MILLIGRAM(S): at 13:46

## 2020-09-10 RX ADMIN — MEMANTINE HYDROCHLORIDE 5 MILLIGRAM(S): 10 TABLET ORAL at 13:46

## 2020-09-10 RX ADMIN — Medication 650 MILLIGRAM(S): at 18:25

## 2020-09-10 RX ADMIN — Medication 650 MILLIGRAM(S): at 14:40

## 2020-09-10 NOTE — PROGRESS NOTE ADULT - SUBJECTIVE AND OBJECTIVE BOX
SUBJECTIVE: Patient seen and examined. @620  Pain controlled.  No f/c/n/v/cp/sob.    OBJECTIVE:  NAD, A+O x1 (only to self)    Vital Signs Last 24 Hrs  T(C): 36.8 (10 Sep 2020 05:45), Max: 36.8 (10 Sep 2020 05:45)  T(F): 98.3 (10 Sep 2020 05:45), Max: 98.3 (10 Sep 2020 05:45)  HR: 94 (10 Sep 2020 05:45) (82 - 94)  BP: 152/74 (10 Sep 2020 05:45) (116/61 - 152/74)  BP(mean): --  RR: 19 (10 Sep 2020 05:45) (18 - 20)  SpO2: 96% (10 Sep 2020 05:45) (96% - 99%)    Affected extremity:          Prevena: maintaining suction         Sensation: SILT         Motor exam: firing TA/GS/EHL         warm well perfused; capillary refill <3 seconds                                     9.9    7.24  )-----------( 159      ( 09 Sep 2020 07:20 )             30.3       A/P :  Pt is a 85yo Male s/p  L revision hemiarthroplasty 9/6  -    Pain control  -    DVT ppx: SCD/ASA     -    Weight bearing status: WBAT   -    Posterior hip precautions, fall precautions  -    Physical Therapy  -    Appreciate Urology recs - keep hopper in, follow up outpatient  -    Dispo: DOMINGO Rivera MD  Chief Orthopaedic Resident  Orthopaedic Surgery

## 2020-09-10 NOTE — PROGRESS NOTE ADULT - ASSESSMENT
85M w h/o Alzheimer's dementia (oriented to self), BPH, Prostate CA, CKDIV (baseline Cr in 3s), HTN, BPH, w recent LEFT hemiarthroplasty w Dr. Hall readmitted post fall found to have periprosthetic fx now s/p revision TERRELL w Dr. Eduardo 9/6 c/b urinary retention     #Post-op state. Pain currently controlled. On bowel regimen and IS at bedside. PPx: ASA BID. DISPO: DOMINGO -- referrals have been placed  #CKD-IV - cr improving. 4.4 from 4.3 from 4.5. Remains above baseline 3.4 however on last DC baseline in 4.   #Normocytic anemia - stable 9.7 from 9.9 from 10 from 13 (s/p multiple units in OR). was 6.7 on admission. Appears asymptomatic. Would follow clinically  #Urinary retention - urology called. Casas in place  #HTN - at target. c/w amlodipine 2.5  #Alzheimer's dementia - chronic. oriented to self at baseline. c/w memantine  #BPH - chronic. w retention above. c/w home flomax    Recommendations  --Encourage optimization of sleep wake cycle - PT, OOB to chair, eating during day to encourage pt to be more rested for bed at nighttime  --Recommend BLE Doppler in setting of increased swelling LLE > RLE  --Encourage Elevation of BLE while seated  --Mobilize w PT, OOB to chair    DISPO: DOMINGO/HPT

## 2020-09-10 NOTE — PROGRESS NOTE ADULT - SUBJECTIVE AND OBJECTIVE BOX
INTERVAL HPI/OVERNIGHT EVENTS: RONALDO O/N    SUBJECTIVE: Patient seen and examined at bedside.   Pt seated in chair - comfortable. Oriented to self only. Denies any pain, chest pain, dyspnea, nausea, abd pain. Frequently tagential and requires redirection. Unable to obtain full ROS due to mental status.   OBJECTIVE:    VITAL SIGNS:  ICU Vital Signs Last 24 Hrs  T(C): 36.7 (10 Sep 2020 13:40), Max: 37.4 (10 Sep 2020 08:29)  T(F): 98.1 (10 Sep 2020 13:40), Max: 99.4 (10 Sep 2020 08:29)  HR: 87 (10 Sep 2020 13:40) (82 - 96)  BP: 134/64 (10 Sep 2020 13:40) (116/61 - 153/72)  BP(mean): --  ABP: --  ABP(mean): --  RR: 18 (10 Sep 2020 13:40) (16 - 20)  SpO2: 98% (10 Sep 2020 13:40) (96% - 98%)        09-09 @ 07:01  -  09-10 @ 07:00  --------------------------------------------------------  IN: 1100 mL / OUT: 3450 mL / NET: -2350 mL    09-10 @ 07:01  -  09-10 @ 15:42  --------------------------------------------------------  IN: 640 mL / OUT: 1460 mL / NET: -820 mL      CAPILLARY BLOOD GLUCOSE          PHYSICAL EXAM:  GEN: Male in NAD on RA  HEENT: NC/AT, MMM  CV: RRR, nml S1S2  PULM: nml effort, CTAB, There is increased soft, pitting edema in LEFT distal thigh to ankles. No redness or increased warmth. Non-tender to palpation  ABD: soft, NABS, non-tender  NEURO: Asleep - oriented to self only. moving all extremities. LLE limited by pain. EOMI.   PSYCH: perseverating but redirectable.    MEDICATIONS:  MEDICATIONS  (STANDING):  acetaminophen   Tablet .. 650 milliGRAM(s) Oral every 6 hours  amLODIPine   Tablet 2.5 milliGRAM(s) Oral daily  aspirin enteric coated 81 milliGRAM(s) Oral two times a day  atorvastatin 20 milliGRAM(s) Oral at bedtime  BUpivacaine liposome 1.3% Injectable (no eMAR) 20 milliLiter(s) Local Injection once  cefpodoxime 200 milliGRAM(s) Oral daily  memantine 5 milliGRAM(s) Oral daily  pantoprazole    Tablet 40 milliGRAM(s) Oral before breakfast  sodium bicarbonate 650 milliGRAM(s) Oral three times a day  tamsulosin 0.4 milliGRAM(s) Oral at bedtime    MEDICATIONS  (PRN):  ondansetron Injectable 4 milliGRAM(s) IV Push every 6 hours PRN Nausea and/or Vomiting  traMADol 25 milliGRAM(s) Oral every 6 hours PRN Severe Pain (7 - 10)      ALLERGIES:  Allergies    penicillins (Rash)    Intolerances        LABS:                        9.7    6.58  )-----------( 184      ( 10 Sep 2020 06:25 )             30.4     09-10    138  |  108  |  62<H>  ----------------------------<  106<H>  4.9   |  19<L>  |  4.40<H>    Ca    8.4      10 Sep 2020 06:25            RADIOLOGY & ADDITIONAL TESTS: Reviewed.

## 2020-09-10 NOTE — PROGRESS NOTE ADULT - SUBJECTIVE AND OBJECTIVE BOX
Ortho Note    Pt comfortable without complaints, pain controlled  Denies CP, SOB, N/V, numbness/tingling     Vital Signs Last 24 Hrs  T(C): 36.7 (09-10-20 @ 13:40), Max: 37.4 (09-10-20 @ 08:29)  T(F): 98.1 (09-10-20 @ 13:40), Max: 99.4 (09-10-20 @ 08:29)  HR: 87 (09-10-20 @ 13:40) (87 - 96)  BP: 134/64 (09-10-20 @ 13:40) (134/64 - 153/72)  BP(mean): --  RR: 18 (09-10-20 @ 13:40) (16 - 18)  SpO2: 98% (09-10-20 @ 13:40) (97% - 98%)    General: Pt Alert and oriented, NAD  DSG C/D/I Left hip with prevena  Pulses intact LLE  Sensation intact LLE  Motor: EHL/FHL/TA/GS 5/5 LLE  Casas in place                          9.7    6.58  )-----------( 184      ( 10 Sep 2020 06:25 )             30.4   10 Sep 2020 06:25    138    |  108    |  62     ----------------------------<  106    4.9     |  19     |  4.40           A/P: 86yMale POD#4 s/p Revision Right hip hemiarthroplasty  - Stable  - Pain Control  - DVT ppx: asa  - PT, WBS: wbat  - f/u doppler LLE to r/o DVT  - pending DOMINGO, plan discussed with family    Ortho Pager [phone]

## 2020-09-10 NOTE — CHART NOTE - NSCHARTNOTEFT_GEN_A_CORE
Admitting Diagnosis:   Patient is a 86y old  Male who presents with a chief complaint of L Hip periprosthetic fracture (10 Sep 2020 15:41)    PAST MEDICAL & SURGICAL HISTORY:  Chronic kidney disease, unspecified CKD stage  Cancer of skin, squamous cell  Prostate CA  Alzheimer disease  Hyperlipidemia  HTN (hypertension)  H/O prostate cancer    Current Nutrition Order: Dysphagia level 2 [facility] soft diet with thin liquids.      PO Intake: Good (%) [ x  ]  Fair (50-75%) [   ] Poor (<25%) [   ]- Per discussion with 1:1 magy teresa tolerating PO well.     GI Issues:   WDL, last BM 9/10  No n/v/d/c noted  No abd distention noted    Pain:  No pain reported at this time- currently on pain regime    Skin Integrity:  Surgical incision, glo score 16  No edema present  No pressure ulcers noted    Labs:   09-10    138  |  108  |  62<H>  ----------------------------<  106<H>  4.9   |  19<L>  |  4.40<H>    Ca    8.4      10 Sep 2020 06:25    Medications:  MEDICATIONS  (STANDING):  acetaminophen   Tablet .. 650 milliGRAM(s) Oral every 6 hours  amLODIPine   Tablet 2.5 milliGRAM(s) Oral daily  aspirin enteric coated 81 milliGRAM(s) Oral two times a day  atorvastatin 20 milliGRAM(s) Oral at bedtime  BUpivacaine liposome 1.3% Injectable (no eMAR) 20 milliLiter(s) Local Injection once  cefpodoxime 200 milliGRAM(s) Oral daily  memantine 5 milliGRAM(s) Oral daily  pantoprazole    Tablet 40 milliGRAM(s) Oral before breakfast  sodium bicarbonate 650 milliGRAM(s) Oral three times a day  tamsulosin 0.4 milliGRAM(s) Oral at bedtime    MEDICATIONS  (PRN):  ondansetron Injectable 4 milliGRAM(s) IV Push every 6 hours PRN Nausea and/or Vomiting  traMADol 25 milliGRAM(s) Oral every 6 hours PRN Severe Pain (7 - 10)    Admitting Anthropometrics:  Height: 62" Weight: 90lbs, IBW 118lbs+/-10%, %IBW 76%, BMI 16.5,    Weight:  9/6 90lbs    Weight Change: No new weights obtained this admission. Please obtain new weight for likely inaccurate admission weight and cont to trend weights weekly.     Nutrition Focused Physical Exam: Completed [ x-9/5  ]  Not Pertinent [   ]  NFPE unremarkable at this time.    Estimated energy needs:   IBW used for calculations as question accuracy of ABW, adjusted for age and post-op wound healing.   Kcal (25-30 kcal/kg): 4888-8247 kcal  Protein (1.2-1.4 g/kg pro): 64-75 g pro  Fluids (30-35 ml/kg): 4137-2222 ml    Subjective:   85M with Alzheimer's dementia, Prostate CA in past, HTN, hyperlipidemia, Squamous Cell Carcinoma, CKD (baseline Cr 3.04, based off of previous charts), S/p recent L periprosthetic hip fracture following L hip hemiarthroplasty 8/19 now brought in by aide secondary to difficulty ambulating and bearing weight on L hip x 2 days. Pt now s/p revision of TERRELL 9/6. On assessment, pt is resting in bed with 1:1 sitter at bedside. Pt unable to participate in exam due to AMS (AOx1), hx provided by disc with team and EMR. Currently on Dysphagia level 2 [facility] soft diet with thin liquids. Pt is tolerating PO well per disc with team. Pt consumed >75% of breakfast (100% of tea, eggs, fruit, and chocolate pudding) and lunch (soup, 50% of fish and rice entree, and ice cream) today. Please cont to encourage adequate PO intake with emphasis on high protein foods to aid with wound healing. Education deferred. Please see nutrition recs below. RD to follow up per protocol.     Previous Nutrition Diagnosis: Inadequate Energy Intake RT on full liquid diet AEB meeting <75% EER at this time.     Active [   ]  Resolved [ x  ]    If resolved, new PES: Increased pro needs RT wound healing AEB s/p post-op.     Goal/Expected Outcome Consistently meet >75% EER.    Recommendations:  1. Cont with dysphagia level 2 [facility] soft diet with thin liquids  >> Cont to adjust consistency of diet per SLP recommendations  >> Monitor need for ONS if appetite is consistently <50% of meals  2. Pain and bowel regime per team  3. Please cont to encourage adequate PO intake with emphasis on lean protein when feasible  4. Monitor lytes and replete prn  5. Please obtain new weight if feasible and then trend biweekly.     Education: Education deferred 2/2 AMS    Risk Level: High [   ] Moderate [ x  ] Low [   ]

## 2020-09-11 ENCOUNTER — TRANSCRIPTION ENCOUNTER (OUTPATIENT)
Age: 85
End: 2020-09-11

## 2020-09-11 LAB
ANION GAP SERPL CALC-SCNC: 14 MMOL/L — SIGNIFICANT CHANGE UP (ref 5–17)
BUN SERPL-MCNC: 60 MG/DL — HIGH (ref 7–23)
CALCIUM SERPL-MCNC: 8.6 MG/DL — SIGNIFICANT CHANGE UP (ref 8.4–10.5)
CHLORIDE SERPL-SCNC: 109 MMOL/L — HIGH (ref 96–108)
CO2 SERPL-SCNC: 18 MMOL/L — LOW (ref 22–31)
CREAT SERPL-MCNC: 4.61 MG/DL — HIGH (ref 0.5–1.3)
GLUCOSE SERPL-MCNC: 120 MG/DL — HIGH (ref 70–99)
HCT VFR BLD CALC: 29.5 % — LOW (ref 39–50)
HGB BLD-MCNC: 9.6 G/DL — LOW (ref 13–17)
MCHC RBC-ENTMCNC: 30.3 PG — SIGNIFICANT CHANGE UP (ref 27–34)
MCHC RBC-ENTMCNC: 32.5 GM/DL — SIGNIFICANT CHANGE UP (ref 32–36)
MCV RBC AUTO: 93.1 FL — SIGNIFICANT CHANGE UP (ref 80–100)
NRBC # BLD: 0 /100 WBCS — SIGNIFICANT CHANGE UP (ref 0–0)
PLATELET # BLD AUTO: 192 K/UL — SIGNIFICANT CHANGE UP (ref 150–400)
POTASSIUM SERPL-MCNC: 4.6 MMOL/L — SIGNIFICANT CHANGE UP (ref 3.5–5.3)
POTASSIUM SERPL-SCNC: 4.6 MMOL/L — SIGNIFICANT CHANGE UP (ref 3.5–5.3)
RBC # BLD: 3.17 M/UL — LOW (ref 4.2–5.8)
RBC # FLD: 14.2 % — SIGNIFICANT CHANGE UP (ref 10.3–14.5)
SARS-COV-2 RNA SPEC QL NAA+PROBE: SIGNIFICANT CHANGE UP
SODIUM SERPL-SCNC: 141 MMOL/L — SIGNIFICANT CHANGE UP (ref 135–145)
WBC # BLD: 8.49 K/UL — SIGNIFICANT CHANGE UP (ref 3.8–10.5)
WBC # FLD AUTO: 8.49 K/UL — SIGNIFICANT CHANGE UP (ref 3.8–10.5)

## 2020-09-11 PROCEDURE — 93971 EXTREMITY STUDY: CPT | Mod: 26,LT

## 2020-09-11 RX ORDER — ACETAMINOPHEN 500 MG
2 TABLET ORAL
Qty: 0 | Refills: 0 | DISCHARGE
Start: 2020-09-11

## 2020-09-11 RX ADMIN — Medication 650 MILLIGRAM(S): at 06:42

## 2020-09-11 RX ADMIN — AMLODIPINE BESYLATE 2.5 MILLIGRAM(S): 2.5 TABLET ORAL at 06:42

## 2020-09-11 RX ADMIN — Medication 650 MILLIGRAM(S): at 15:27

## 2020-09-11 RX ADMIN — Medication 81 MILLIGRAM(S): at 17:02

## 2020-09-11 RX ADMIN — Medication 650 MILLIGRAM(S): at 14:04

## 2020-09-11 RX ADMIN — MEMANTINE HYDROCHLORIDE 5 MILLIGRAM(S): 10 TABLET ORAL at 14:04

## 2020-09-11 RX ADMIN — Medication 650 MILLIGRAM(S): at 14:03

## 2020-09-11 RX ADMIN — Medication 200 MILLIGRAM(S): at 14:03

## 2020-09-11 RX ADMIN — PANTOPRAZOLE SODIUM 40 MILLIGRAM(S): 20 TABLET, DELAYED RELEASE ORAL at 06:42

## 2020-09-11 RX ADMIN — Medication 650 MILLIGRAM(S): at 07:40

## 2020-09-11 RX ADMIN — Medication 81 MILLIGRAM(S): at 06:42

## 2020-09-11 NOTE — PROGRESS NOTE ADULT - REASON FOR ADMISSION
L Hip periprosthetic fracture

## 2020-09-11 NOTE — DISCHARGE NOTE NURSING/CASE MANAGEMENT/SOCIAL WORK - PATIENT PORTAL LINK FT
You can access the FollowMyHealth Patient Portal offered by [facility] by registering at the following website: [email or web address]. By joining [facility] FollowMyHealth portal, you will also be able to view your health information using other applications (apps) compatible with our system.

## 2020-09-11 NOTE — PROGRESS NOTE ADULT - SUBJECTIVE AND OBJECTIVE BOX
Orthopaedic Surgery Progress Note    S/p L hip chaparro 8/19 for FNF now POD 2 s/p L revision stem and cerclage wiring for periprosthetic fracture    Subjective:     Sleeping in bed. Awakes to voice. Denies pain.     Objective:    Vital Signs Last 24 Hrs  T(C): 37 (09-11-20 @ 06:01), Max: 37 (09-11-20 @ 06:01)  T(F): 98.6 (09-11-20 @ 06:01), Max: 98.6 (09-11-20 @ 06:01)  HR: 107 (09-11-20 @ 06:01) (107 - 107)  BP: 156/72 (09-11-20 @ 06:01) (156/72 - 156/72)  BP(mean): --  RR: 18 (09-11-20 @ 06:01) (18 - 18)  SpO2: 94% (09-11-20 @ 06:01) (94% - 94%)  AVSS    PE:  General: Patient somnolent, NAD  Dressing: Clean/dry/intact prevena L hip   Pulses: 2+ DP BLE   Sensorimotor exam unable to be performed secondary to dementia                          9.6    8.49  )-----------( 192      ( 11 Sep 2020 06:24 )             29.5   11 Sep 2020 06:24    141    |  109    |  60     ----------------------------<  120    4.6     |  18     |  4.61     Ca    8.6        11 Sep 2020 06:24        A/P: 86yMale s/p L hip chaparro 8/19 for FNF now POD 2 s/p L revision stem and cerclage wiring for periprosthetic fracture   1. Pain control as needed  2. DVT prophylaxis: ASA   3. PT, weight-bearing status: WBAT    4. Continue Casas per Urology. Follow up outpatient with Urologist in 2 weeks for re-eval.   5. LLE Doppler pending   6. COVID swab pending (asymptomatic, needed for rehab)  7. Dispo: DOMINGO this afternoon    Ortho Pager [phone]

## 2020-09-11 NOTE — PROGRESS NOTE ADULT - SUBJECTIVE AND OBJECTIVE BOX
SUBJECTIVE: Patient seen and examined. @830am  Pain controlled.  No f/c/n/v/cp/sob.    OBJECTIVE:  NAD, A+O x1 (only to self)    Vital Signs Last 24 Hrs  T(C): 37 (11 Sep 2020 06:01), Max: 37.2 (10 Sep 2020 17:15)  T(F): 98.6 (11 Sep 2020 06:01), Max: 98.9 (10 Sep 2020 17:15)  HR: 107 (11 Sep 2020 06:01) (82 - 107)  BP: 156/72 (11 Sep 2020 06:01) (134/64 - 161/77)  BP(mean): --  RR: 18 (11 Sep 2020 06:01) (17 - 19)  SpO2: 94% (11 Sep 2020 06:01) (94% - 100%)      Affected extremity:          Prevena: maintaining suction         Sensation: SILT         Motor exam: firing TA/GS/EHL         warm well perfused; capillary refill <3 seconds                                                         9.6    8.49  )-----------( 192      ( 11 Sep 2020 06:24 )             29.5         A/P :  Pt is a 87yo Male s/p  L revision hemiarthroplasty 9/6  -    Pain control  -    DVT ppx: SCD/ASA     -    Weight bearing status: WBAT   -    Posterior hip precautions, fall precautions  -    Physical Therapy  -    Appreciate Urology recs - keep hopper in, follow up outpatient  -    Dispo: DOMINGO Rivera MD  Chief Orthopaedic Resident  Orthopaedic Surgery

## 2020-09-11 NOTE — DISCHARGE NOTE NURSING/CASE MANAGEMENT/SOCIAL WORK - NSDPFAC_GEN_ALL_CORE
[facility]/ [address] [place] [id number]/ Phone: [phone] ext. [phone]

## 2020-09-12 VITALS
OXYGEN SATURATION: 97 % | RESPIRATION RATE: 17 BRPM | DIASTOLIC BLOOD PRESSURE: 66 MMHG | HEART RATE: 84 BPM | SYSTOLIC BLOOD PRESSURE: 135 MMHG

## 2020-09-12 PROCEDURE — 93005 ELECTROCARDIOGRAM TRACING: CPT

## 2020-09-12 PROCEDURE — 71045 X-RAY EXAM CHEST 1 VIEW: CPT

## 2020-09-12 PROCEDURE — 36430 TRANSFUSION BLD/BLD COMPNT: CPT

## 2020-09-12 PROCEDURE — 85025 COMPLETE CBC W/AUTO DIFF WBC: CPT

## 2020-09-12 PROCEDURE — 81001 URINALYSIS AUTO W/SCOPE: CPT

## 2020-09-12 PROCEDURE — 80053 COMPREHEN METABOLIC PANEL: CPT

## 2020-09-12 PROCEDURE — 70450 CT HEAD/BRAIN W/O DYE: CPT

## 2020-09-12 PROCEDURE — 36415 COLL VENOUS BLD VENIPUNCTURE: CPT

## 2020-09-12 PROCEDURE — 93971 EXTREMITY STUDY: CPT

## 2020-09-12 PROCEDURE — 97162 PT EVAL MOD COMPLEX 30 MIN: CPT

## 2020-09-12 PROCEDURE — 86901 BLOOD TYPING SEROLOGIC RH(D): CPT

## 2020-09-12 PROCEDURE — 73502 X-RAY EXAM HIP UNI 2-3 VIEWS: CPT

## 2020-09-12 PROCEDURE — 73700 CT LOWER EXTREMITY W/O DYE: CPT

## 2020-09-12 PROCEDURE — 97116 GAIT TRAINING THERAPY: CPT

## 2020-09-12 PROCEDURE — U0003: CPT

## 2020-09-12 PROCEDURE — 87086 URINE CULTURE/COLONY COUNT: CPT

## 2020-09-12 PROCEDURE — P9016: CPT

## 2020-09-12 PROCEDURE — 72170 X-RAY EXAM OF PELVIS: CPT

## 2020-09-12 PROCEDURE — C1776: CPT

## 2020-09-12 PROCEDURE — 87635 SARS-COV-2 COVID-19 AMP PRB: CPT

## 2020-09-12 PROCEDURE — 97535 SELF CARE MNGMENT TRAINING: CPT

## 2020-09-12 PROCEDURE — 99285 EMERGENCY DEPT VISIT HI MDM: CPT | Mod: 25

## 2020-09-12 PROCEDURE — 85027 COMPLETE CBC AUTOMATED: CPT

## 2020-09-12 PROCEDURE — 85730 THROMBOPLASTIN TIME PARTIAL: CPT

## 2020-09-12 PROCEDURE — 97161 PT EVAL LOW COMPLEX 20 MIN: CPT

## 2020-09-12 PROCEDURE — 80048 BASIC METABOLIC PNL TOTAL CA: CPT

## 2020-09-12 PROCEDURE — 85610 PROTHROMBIN TIME: CPT

## 2020-09-12 PROCEDURE — 86850 RBC ANTIBODY SCREEN: CPT

## 2020-09-12 PROCEDURE — 88304 TISSUE EXAM BY PATHOLOGIST: CPT

## 2020-09-12 PROCEDURE — 82962 GLUCOSE BLOOD TEST: CPT

## 2020-09-12 PROCEDURE — 73552 X-RAY EXAM OF FEMUR 2/>: CPT

## 2020-09-12 PROCEDURE — 86923 COMPATIBILITY TEST ELECTRIC: CPT

## 2020-09-12 PROCEDURE — C1889: CPT

## 2020-09-12 PROCEDURE — 97530 THERAPEUTIC ACTIVITIES: CPT

## 2020-09-12 RX ADMIN — Medication 650 MILLIGRAM(S): at 12:25

## 2020-09-12 RX ADMIN — Medication 200 MILLIGRAM(S): at 12:21

## 2020-09-12 RX ADMIN — Medication 650 MILLIGRAM(S): at 12:22

## 2020-09-12 RX ADMIN — MEMANTINE HYDROCHLORIDE 5 MILLIGRAM(S): 10 TABLET ORAL at 12:21

## 2020-09-18 DIAGNOSIS — I10 ESSENTIAL (PRIMARY) HYPERTENSION: ICD-10-CM

## 2020-09-18 DIAGNOSIS — Z88.0 ALLERGY STATUS TO PENICILLIN: ICD-10-CM
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
## 2020-09-18 DIAGNOSIS — Z85.828 PERSONAL HISTORY OF OTHER MALIGNANT NEOPLASM OF SKIN: ICD-10-CM

## 2020-09-18 DIAGNOSIS — G30.9 ALZHEIMER'S DISEASE, UNSPECIFIED: ICD-10-CM

## 2020-09-18 DIAGNOSIS — I12.0 HYPERTENSIVE CHRONIC KIDNEY DISEASE WITH STAGE 5 CHRONIC KIDNEY DISEASE OR END STAGE RENAL DISEASE: ICD-10-CM

## 2020-09-18 DIAGNOSIS — Z85.46 PERSONAL HISTORY OF MALIGNANT NEOPLASM OF PROSTATE: ICD-10-CM

## 2020-09-18 DIAGNOSIS — M97.02XA PERIPROSTHETIC FRACTURE AROUND INTERNAL PROSTHETIC LEFT HIP JOINT, INITIAL ENCOUNTER: ICD-10-CM

## 2020-09-18 DIAGNOSIS — R33.9 RETENTION OF URINE, UNSPECIFIED: ICD-10-CM

## 2020-09-18 DIAGNOSIS — N18.5 CHRONIC KIDNEY DISEASE, STAGE 5: ICD-10-CM

## 2020-09-18 DIAGNOSIS — D63.1 ANEMIA IN CHRONIC KIDNEY DISEASE: ICD-10-CM

## 2020-09-18 DIAGNOSIS — N40.1 BENIGN PROSTATIC HYPERPLASIA WITH LOWER URINARY TRACT SYMPTOMS: ICD-10-CM

## 2020-09-18 DIAGNOSIS — E78.5 HYPERLIPIDEMIA, UNSPECIFIED: ICD-10-CM

## 2020-09-18 DIAGNOSIS — F02.80 DEMENTIA IN OTHER DISEASES CLASSIFIED ELSEWHERE, UNSPECIFIED SEVERITY, WITHOUT BEHAVIORAL DISTURBANCE, PSYCHOTIC DISTURBANCE, MOOD DISTURBANCE, AND ANXIETY: ICD-10-CM
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
## 2020-09-24 ENCOUNTER — APPOINTMENT (OUTPATIENT)
Dept: ORTHOPEDIC SURGERY | Facility: CLINIC | Age: 85
End: 2020-09-24
Payer: COMMERCIAL

## 2020-09-24 VITALS — WEIGHT: 122 LBS | BODY MASS INDEX: 21.62 KG/M2 | HEIGHT: 63 IN

## 2020-09-24 PROCEDURE — 99024 POSTOP FOLLOW-UP VISIT: CPT

## 2020-09-24 NOTE — HISTORY OF PRESENT ILLNESS
[___ Weeks Post Op] : [unfilled] weeks post op [de-identified] : First post op s/p revision left hip hemiarthroplasty with open reduction and internal fixation, surgical date 9/6/2020 [de-identified] : Doing well @ facility. Pain seems to be minimal. Receiving PT. [de-identified] : Left posterolateral hip incision healed, staples replaced with Steristrips. No erythema/fluctuance/dehiscence. LLE mildly swollen. Hip ROM deferred. Able to stand and transfer with 1-person assistance. No significantly increased pain with weightbearing. Limb lengths clinically similar. NVI. [de-identified] : XRs reviewed from facility. Left hip chaparro and cables in unchanged position from immediate postop. Fracture remains anatomically reduced. No new fracture/dislocation. [de-identified] : 87y/o male about 2.5wk s/p revision left hip hemiarthroplasty with ORIF for [place] B2 PPFFx [de-identified] : Cont PT\par Cont posterior hip precautions\par RTC 4wk with new left hip/femur XRs (unless recently provided from facility)\par Discussed all the above with son Waldemar on the phone; all questions answered

## 2020-10-22 ENCOUNTER — APPOINTMENT (OUTPATIENT)
Dept: ORTHOPEDIC SURGERY | Facility: CLINIC | Age: 85
End: 2020-10-22
Payer: COMMERCIAL

## 2020-10-22 VITALS
WEIGHT: 122 LBS | HEIGHT: 63 IN | SYSTOLIC BLOOD PRESSURE: 120 MMHG | OXYGEN SATURATION: 93 % | BODY MASS INDEX: 21.62 KG/M2 | DIASTOLIC BLOOD PRESSURE: 60 MMHG | HEART RATE: 63 BPM

## 2020-10-22 PROCEDURE — 99072 ADDL SUPL MATRL&STAF TM PHE: CPT

## 2020-10-22 PROCEDURE — 99024 POSTOP FOLLOW-UP VISIT: CPT

## 2020-10-22 NOTE — HISTORY OF PRESENT ILLNESS
[de-identified] : Second post op s/p revision left hip hemiarthroplasty with open reduction and internal fixation, surgical date 9/6/2020 [de-identified] : Doing well. Receiving 24hr aide care at home. The aide today reports that he has been very comfortable for the past week or so. The thigh pain that daughter Trish called about seems to have resolved. Son Waldemar and Trish are keeping tabs on things, visit every week. Receives home PT once weekly. Pain has been improving steadily. Can walk and perform all ADLs unassisted, though someone is usually nearby as fall prevention.  [de-identified] : Left hip incision healed, no erythema/drainage/dehiscence. No limb swelling. Hip ROM 0-90 flex, IR deferred, 30 ER, 10 ADD, 30 ABD. Good hip flexor and quad strength, good abduction strength. Able to stand and ambulate without assistance, cautious gait, no antalgia. NVID. [de-identified] : Pelvic and left femur XRs taken today at [facility] demonstrate stable left femoral reconstruction; maintained fracture reduction; no component migration or failure; femoral head well seated without adjacent acetabular changes. [de-identified] : 85y/o male about 6 weeks s/p revision left hip hemiarthroplasty with ORIF for [place] B2 periprosthetic proximal femur fracture, doing well [de-identified] : Cont HEP, home PT\par FWB LLE, cleared posterior hip precautions\par RTC 2mo with new left femur XRs\par Called Waldemar with above findings; all questions answered

## 2020-12-09 ENCOUNTER — TRANSCRIPTION ENCOUNTER (OUTPATIENT)
Age: 85
End: 2020-12-09

## 2020-12-09 ENCOUNTER — EMERGENCY (EMERGENCY)
Facility: HOSPITAL | Age: 85
LOS: 1 days | Discharge: HOME | End: 2020-12-09
Attending: EMERGENCY MEDICINE | Admitting: STUDENT IN AN ORGANIZED HEALTH CARE EDUCATION/TRAINING PROGRAM
Payer: COMMERCIAL

## 2020-12-09 VITALS
HEART RATE: 74 BPM | TEMPERATURE: 97 F | RESPIRATION RATE: 18 BRPM | WEIGHT: 139.99 LBS | HEIGHT: 62 IN | SYSTOLIC BLOOD PRESSURE: 123 MMHG | OXYGEN SATURATION: 97 % | DIASTOLIC BLOOD PRESSURE: 66 MMHG

## 2020-12-09 DIAGNOSIS — N18.5 CHRONIC KIDNEY DISEASE, STAGE 5: ICD-10-CM

## 2020-12-09 DIAGNOSIS — Z79.899 OTHER LONG TERM (CURRENT) DRUG THERAPY: ICD-10-CM

## 2020-12-09 DIAGNOSIS — D64.9 ANEMIA, UNSPECIFIED: ICD-10-CM

## 2020-12-09 DIAGNOSIS — Z20.828 CONTACT WITH AND (SUSPECTED) EXPOSURE TO OTHER VIRAL COMMUNICABLE DISEASES: ICD-10-CM

## 2020-12-09 DIAGNOSIS — R42 DIZZINESS AND GIDDINESS: ICD-10-CM

## 2020-12-09 DIAGNOSIS — I10 ESSENTIAL (PRIMARY) HYPERTENSION: ICD-10-CM

## 2020-12-09 DIAGNOSIS — R74.8 ABNORMAL LEVELS OF OTHER SERUM ENZYMES: ICD-10-CM

## 2020-12-09 DIAGNOSIS — D63.8 ANEMIA IN OTHER CHRONIC DISEASES CLASSIFIED ELSEWHERE: ICD-10-CM

## 2020-12-09 DIAGNOSIS — R53.1 WEAKNESS: ICD-10-CM

## 2020-12-09 DIAGNOSIS — R63.8 OTHER SYMPTOMS AND SIGNS CONCERNING FOOD AND FLUID INTAKE: ICD-10-CM

## 2020-12-09 DIAGNOSIS — Z88.0 ALLERGY STATUS TO PENICILLIN: ICD-10-CM

## 2020-12-09 DIAGNOSIS — G30.9 ALZHEIMER'S DISEASE, UNSPECIFIED: ICD-10-CM

## 2020-12-09 DIAGNOSIS — Z96.642 PRESENCE OF LEFT ARTIFICIAL HIP JOINT: Chronic | ICD-10-CM

## 2020-12-09 DIAGNOSIS — R33.9 RETENTION OF URINE, UNSPECIFIED: ICD-10-CM

## 2020-12-09 DIAGNOSIS — E78.5 HYPERLIPIDEMIA, UNSPECIFIED: ICD-10-CM

## 2020-12-09 DIAGNOSIS — Z85.46 PERSONAL HISTORY OF MALIGNANT NEOPLASM OF PROSTATE: Chronic | ICD-10-CM

## 2020-12-09 DIAGNOSIS — E86.0 DEHYDRATION: ICD-10-CM

## 2020-12-09 DIAGNOSIS — R31.29 OTHER MICROSCOPIC HEMATURIA: ICD-10-CM

## 2020-12-09 DIAGNOSIS — N39.0 URINARY TRACT INFECTION, SITE NOT SPECIFIED: ICD-10-CM

## 2020-12-09 LAB
ALBUMIN SERPL ELPH-MCNC: 3.9 G/DL — SIGNIFICANT CHANGE UP (ref 3.3–5)
ALP SERPL-CCNC: 157 U/L — HIGH (ref 40–120)
ALT FLD-CCNC: 10 U/L — SIGNIFICANT CHANGE UP (ref 10–45)
ANION GAP SERPL CALC-SCNC: 16 MMOL/L — SIGNIFICANT CHANGE UP (ref 5–17)
APPEARANCE UR: CLEAR — SIGNIFICANT CHANGE UP
AST SERPL-CCNC: 10 U/L — SIGNIFICANT CHANGE UP (ref 10–40)
BACTERIA # UR AUTO: PRESENT /HPF
BASOPHILS # BLD AUTO: 0.04 K/UL — SIGNIFICANT CHANGE UP (ref 0–0.2)
BASOPHILS NFR BLD AUTO: 0.5 % — SIGNIFICANT CHANGE UP (ref 0–2)
BILIRUB SERPL-MCNC: 0.2 MG/DL — SIGNIFICANT CHANGE UP (ref 0.2–1.2)
BILIRUB UR-MCNC: NEGATIVE — SIGNIFICANT CHANGE UP
BUN SERPL-MCNC: 73 MG/DL — HIGH (ref 7–23)
CALCIUM SERPL-MCNC: 9 MG/DL — SIGNIFICANT CHANGE UP (ref 8.4–10.5)
CHLORIDE SERPL-SCNC: 107 MMOL/L — SIGNIFICANT CHANGE UP (ref 96–108)
CO2 SERPL-SCNC: 18 MMOL/L — LOW (ref 22–31)
COLOR SPEC: YELLOW — SIGNIFICANT CHANGE UP
CREAT SERPL-MCNC: 5.52 MG/DL — HIGH (ref 0.5–1.3)
DIFF PNL FLD: ABNORMAL
EOSINOPHIL # BLD AUTO: 0.11 K/UL — SIGNIFICANT CHANGE UP (ref 0–0.5)
EOSINOPHIL NFR BLD AUTO: 1.4 % — SIGNIFICANT CHANGE UP (ref 0–6)
EPI CELLS # UR: SIGNIFICANT CHANGE UP /HPF (ref 0–5)
GLUCOSE BLDC GLUCOMTR-MCNC: 118 MG/DL — HIGH (ref 70–99)
GLUCOSE SERPL-MCNC: 139 MG/DL — HIGH (ref 70–99)
GLUCOSE UR QL: 100
HCT VFR BLD CALC: 25.8 % — LOW (ref 39–50)
HGB BLD-MCNC: 8.2 G/DL — LOW (ref 13–17)
IMM GRANULOCYTES NFR BLD AUTO: 0.9 % — SIGNIFICANT CHANGE UP (ref 0–1.5)
KETONES UR-MCNC: NEGATIVE — SIGNIFICANT CHANGE UP
LEUKOCYTE ESTERASE UR-ACNC: ABNORMAL
LYMPHOCYTES # BLD AUTO: 0.87 K/UL — LOW (ref 1–3.3)
LYMPHOCYTES # BLD AUTO: 11.4 % — LOW (ref 13–44)
MCHC RBC-ENTMCNC: 31.7 PG — SIGNIFICANT CHANGE UP (ref 27–34)
MCHC RBC-ENTMCNC: 31.8 GM/DL — LOW (ref 32–36)
MCV RBC AUTO: 99.6 FL — SIGNIFICANT CHANGE UP (ref 80–100)
MONOCYTES # BLD AUTO: 0.4 K/UL — SIGNIFICANT CHANGE UP (ref 0–0.9)
MONOCYTES NFR BLD AUTO: 5.3 % — SIGNIFICANT CHANGE UP (ref 2–14)
NEUTROPHILS # BLD AUTO: 6.11 K/UL — SIGNIFICANT CHANGE UP (ref 1.8–7.4)
NEUTROPHILS NFR BLD AUTO: 80.5 % — HIGH (ref 43–77)
NITRITE UR-MCNC: NEGATIVE — SIGNIFICANT CHANGE UP
NRBC # BLD: 0 /100 WBCS — SIGNIFICANT CHANGE UP (ref 0–0)
PH UR: 6.5 — SIGNIFICANT CHANGE UP (ref 5–8)
PLATELET # BLD AUTO: 165 K/UL — SIGNIFICANT CHANGE UP (ref 150–400)
POTASSIUM SERPL-MCNC: 4.3 MMOL/L — SIGNIFICANT CHANGE UP (ref 3.5–5.3)
POTASSIUM SERPL-SCNC: 4.3 MMOL/L — SIGNIFICANT CHANGE UP (ref 3.5–5.3)
PROT SERPL-MCNC: 6.6 G/DL — SIGNIFICANT CHANGE UP (ref 6–8.3)
PROT UR-MCNC: 100 MG/DL
RBC # BLD: 2.59 M/UL — LOW (ref 4.2–5.8)
RBC # FLD: 14.4 % — SIGNIFICANT CHANGE UP (ref 10.3–14.5)
RBC CASTS # UR COMP ASSIST: < 5 /HPF — SIGNIFICANT CHANGE UP
SARS-COV-2 RNA SPEC QL NAA+PROBE: SIGNIFICANT CHANGE UP
SODIUM SERPL-SCNC: 141 MMOL/L — SIGNIFICANT CHANGE UP (ref 135–145)
SP GR SPEC: 1.02 — SIGNIFICANT CHANGE UP (ref 1–1.03)
TROPONIN T SERPL-MCNC: 0.03 NG/ML — HIGH (ref 0–0.01)
TROPONIN T SERPL-MCNC: 0.04 NG/ML — CRITICAL HIGH (ref 0–0.01)
UROBILINOGEN FLD QL: 0.2 E.U./DL — SIGNIFICANT CHANGE UP
WBC # BLD: 7.6 K/UL — SIGNIFICANT CHANGE UP (ref 3.8–10.5)
WBC # FLD AUTO: 7.6 K/UL — SIGNIFICANT CHANGE UP (ref 3.8–10.5)
WBC UR QL: ABNORMAL /HPF

## 2020-12-09 PROCEDURE — 71045 X-RAY EXAM CHEST 1 VIEW: CPT | Mod: 26

## 2020-12-09 PROCEDURE — 93010 ELECTROCARDIOGRAM REPORT: CPT

## 2020-12-09 PROCEDURE — 99223 1ST HOSP IP/OBS HIGH 75: CPT | Mod: GC

## 2020-12-09 PROCEDURE — 99285 EMERGENCY DEPT VISIT HI MDM: CPT

## 2020-12-09 PROCEDURE — 70450 CT HEAD/BRAIN W/O DYE: CPT | Mod: 26

## 2020-12-09 RX ORDER — INSULIN LISPRO 100/ML
VIAL (ML) SUBCUTANEOUS AT BEDTIME
Refills: 0 | Status: DISCONTINUED | OUTPATIENT
Start: 2020-12-09 | End: 2020-12-09

## 2020-12-09 RX ORDER — SODIUM BICARBONATE 1 MEQ/ML
650 SYRINGE (ML) INTRAVENOUS EVERY 8 HOURS
Refills: 0 | Status: DISCONTINUED | OUTPATIENT
Start: 2020-12-09 | End: 2020-12-10

## 2020-12-09 RX ORDER — MEMANTINE HYDROCHLORIDE 10 MG/1
7 TABLET ORAL DAILY
Refills: 0 | Status: DISCONTINUED | OUTPATIENT
Start: 2020-12-09 | End: 2020-12-09

## 2020-12-09 RX ORDER — DEXTROSE 50 % IN WATER 50 %
25 SYRINGE (ML) INTRAVENOUS ONCE
Refills: 0 | Status: DISCONTINUED | OUTPATIENT
Start: 2020-12-09 | End: 2020-12-10

## 2020-12-09 RX ORDER — AMLODIPINE BESYLATE 2.5 MG/1
2.5 TABLET ORAL DAILY
Refills: 0 | Status: DISCONTINUED | OUTPATIENT
Start: 2020-12-09 | End: 2020-12-10

## 2020-12-09 RX ORDER — CEFTRIAXONE 500 MG/1
1000 INJECTION, POWDER, FOR SOLUTION INTRAMUSCULAR; INTRAVENOUS EVERY 24 HOURS
Refills: 0 | Status: DISCONTINUED | OUTPATIENT
Start: 2020-12-09 | End: 2020-12-10

## 2020-12-09 RX ORDER — CEFPODOXIME PROXETIL 100 MG
1 TABLET ORAL
Qty: 0 | Refills: 0 | DISCHARGE

## 2020-12-09 RX ORDER — ATORVASTATIN CALCIUM 80 MG/1
20 TABLET, FILM COATED ORAL AT BEDTIME
Refills: 0 | Status: DISCONTINUED | OUTPATIENT
Start: 2020-12-09 | End: 2020-12-10

## 2020-12-09 RX ORDER — DEXTROSE 50 % IN WATER 50 %
15 SYRINGE (ML) INTRAVENOUS ONCE
Refills: 0 | Status: DISCONTINUED | OUTPATIENT
Start: 2020-12-09 | End: 2020-12-10

## 2020-12-09 RX ORDER — SODIUM CHLORIDE 9 MG/ML
1000 INJECTION, SOLUTION INTRAVENOUS
Refills: 0 | Status: DISCONTINUED | OUTPATIENT
Start: 2020-12-09 | End: 2020-12-10

## 2020-12-09 RX ORDER — PANTOPRAZOLE SODIUM 20 MG/1
40 TABLET, DELAYED RELEASE ORAL
Refills: 0 | Status: DISCONTINUED | OUTPATIENT
Start: 2020-12-09 | End: 2020-12-10

## 2020-12-09 RX ORDER — MEMANTINE HYDROCHLORIDE 10 MG/1
5 TABLET ORAL EVERY 24 HOURS
Refills: 0 | Status: DISCONTINUED | OUTPATIENT
Start: 2020-12-09 | End: 2020-12-10

## 2020-12-09 RX ORDER — SODIUM CHLORIDE 9 MG/ML
1000 INJECTION INTRAMUSCULAR; INTRAVENOUS; SUBCUTANEOUS ONCE
Refills: 0 | Status: COMPLETED | OUTPATIENT
Start: 2020-12-09 | End: 2020-12-09

## 2020-12-09 RX ORDER — HEPARIN SODIUM 5000 [USP'U]/ML
5000 INJECTION INTRAVENOUS; SUBCUTANEOUS EVERY 8 HOURS
Refills: 0 | Status: DISCONTINUED | OUTPATIENT
Start: 2020-12-09 | End: 2020-12-10

## 2020-12-09 RX ORDER — GLUCAGON INJECTION, SOLUTION 0.5 MG/.1ML
1 INJECTION, SOLUTION SUBCUTANEOUS ONCE
Refills: 0 | Status: DISCONTINUED | OUTPATIENT
Start: 2020-12-09 | End: 2020-12-10

## 2020-12-09 RX ORDER — TAMSULOSIN HYDROCHLORIDE 0.4 MG/1
0.4 CAPSULE ORAL AT BEDTIME
Refills: 0 | Status: DISCONTINUED | OUTPATIENT
Start: 2020-12-09 | End: 2020-12-10

## 2020-12-09 RX ADMIN — ATORVASTATIN CALCIUM 20 MILLIGRAM(S): 80 TABLET, FILM COATED ORAL at 22:26

## 2020-12-09 RX ADMIN — TAMSULOSIN HYDROCHLORIDE 0.4 MILLIGRAM(S): 0.4 CAPSULE ORAL at 22:25

## 2020-12-09 RX ADMIN — Medication 650 MILLIGRAM(S): at 22:41

## 2020-12-09 RX ADMIN — AMLODIPINE BESYLATE 2.5 MILLIGRAM(S): 2.5 TABLET ORAL at 18:52

## 2020-12-09 RX ADMIN — MEMANTINE HYDROCHLORIDE 5 MILLIGRAM(S): 10 TABLET ORAL at 18:52

## 2020-12-09 RX ADMIN — CEFTRIAXONE 100 MILLIGRAM(S): 500 INJECTION, POWDER, FOR SOLUTION INTRAMUSCULAR; INTRAVENOUS at 18:52

## 2020-12-09 RX ADMIN — SODIUM CHLORIDE 1000 MILLILITER(S): 9 INJECTION INTRAMUSCULAR; INTRAVENOUS; SUBCUTANEOUS at 13:40

## 2020-12-09 RX ADMIN — PANTOPRAZOLE SODIUM 40 MILLIGRAM(S): 20 TABLET, DELAYED RELEASE ORAL at 22:26

## 2020-12-09 RX ADMIN — HEPARIN SODIUM 5000 UNIT(S): 5000 INJECTION INTRAVENOUS; SUBCUTANEOUS at 22:25

## 2020-12-09 NOTE — ED PROVIDER NOTE - PHYSICAL EXAMINATION
VITAL SIGNS: I have reviewed nursing notes and confirm.  CONSTITUTIONAL: Well-developed; well-nourished; in no acute distress.   SKIN:  warm and dry, no acute rash.   HEAD:  normocephalic, atraumatic.  EYES: PERRL.  EOM intact; conjunctiva and sclera clear.  ENT: No nasal discharge; airway clear.   NECK: Supple; non tender.  CARD: S1, S2 normal; no murmurs, gallops, or rubs. Regular rate and rhythm.   RESP:  Clear to auscultation b/l, no wheezes, rales or rhonchi.  ABD: Normal bowel sounds; soft; non-distended; non-tender; no guarding/rebound.  EXT: Normal ROM. No clubbing, cyanosis or edema. 2+ pulses to b/l ue/le.  NEURO: Alert, oriented to person, grossly unremarkable.  Following simple commands.  5/5 strength x 4 extremities against gravity and external force.  No drift x 4 extremities.  No facial asymmetry.    PSYCH: Cooperative, mood and affect appropriate.

## 2020-12-09 NOTE — ED PROVIDER NOTE - OBJECTIVE STATEMENT
86 year old male with history of Dementia presents to ED from home with aid secondary to concern for complaint of feeling dizzy this morning while transferring in bathroom from shaving to shower.  Patient's aid who is present at bedside in ED notes she sat him down and he felt improved within a few seconds.  She notes he is at his baseline mentation on arrival to ED.  He is awake, alert and slightly confused in conversation which aid states is typical of him at baseline.  He is able to move all extremities and is easily arousable to voice on arrival.  Additional history is limited as patient does not provide reliable history.  Aid denies any additional acute complaints or concerns - she notes he has 24 hour aid care at home and she is frequently involved in his care.

## 2020-12-09 NOTE — ED ADULT NURSE NOTE - CHPI ED NUR SYMPTOMS NEG
no fever/no nausea/no numbness/no weakness/no change in level of consciousness/no confusion/no blurred vision/no loss of consciousness/no vomiting

## 2020-12-09 NOTE — DISCHARGE NOTE PROVIDER - NSDCFUSCHEDAPPT_GEN_ALL_CORE_FT
ILEANA CORCORAN ; 12/28/2020 ; [facility] Orthosurg [address] [facility] ; 12/28/2020 ; [facility] Orthosurg [address] [facility] ; 12/31/2020 ; NPP Nephro [address] [place] [address]

## 2020-12-09 NOTE — H&P ADULT - PROBLEM SELECTOR PROBLEM 4
Hyperlipidemia Alzheimer disease Chronic kidney disease, stage 5 Anemia Alkaline phosphatase elevation

## 2020-12-09 NOTE — DISCHARGE NOTE PROVIDER - NSDCMRMEDTOKEN_GEN_ALL_CORE_FT
amLODIPine 2.5 mg oral tablet: 1 tab(s) orally once a day  atorvastatin 20 mg oral tablet: 1 tab(s) orally once a day  memantine 7 mg oral capsule, extended release: 1 cap(s) orally once a day  omeprazole 20 mg oral delayed release tablet: 1 tab(s) orally once a day  sodium bicarbonate 650 mg oral tablet: 1 tab(s) orally 3 times a day  tamsulosin 0.4 mg oral capsule: 1 cap(s) orally once a day (at bedtime)   amLODIPine 2.5 mg oral tablet: 1 tab(s) orally once a day  atorvastatin 20 mg oral tablet: 1 tab(s) orally once a day  cefpodoxime 100 mg oral tablet: 1 tab(s) orally 2 times a day   memantine 7 mg oral capsule, extended release: 1 cap(s) orally once a day  omeprazole 20 mg oral delayed release tablet: 1 tab(s) orally once a day  sodium bicarbonate 650 mg oral tablet: 1 tab(s) orally 3 times a day  tamsulosin 0.4 mg oral capsule: 1 cap(s) orally once a day (at bedtime)

## 2020-12-09 NOTE — H&P ADULT - PROBLEM SELECTOR PLAN 3
Home med: donezepil 5mg qhs. HA notes patient at baseline mentation upon presentation : awake, alert and slightly confused in conversation. Home meds: sodium bicarb 650 qd, lasix 20mg qd. Cr 5.52 at presentation (baseline Cr ~5). Followed by nephrologist Dr. Ashraf.  - trend Cr  - encourage continued PO intake  - avoid nephrotoxic drugs, renally dose meds  - will consider obtaining urine lytes if not improving Normocytic, Hgb at presentation 8.2 (baseline ~9). Fe studies 8/2020 TIBC/ferritin WNL, HHA denies hematuria, hematochezia, melena or hematemesis. Most likely anemia of chronic disease due to CKD stage 5.    - trend CBC  - maintain active T&S  - transfuse if Hgb <7 On admission, Alk Phos elev to 157. AST 10 and ALT 10. Patient without any abdominal pain, non-tender on exam. No N/V, or changes in BM noted by HHA. No history of liver or gallbladder-related diagnoses.   - trend alk phos Red blood cells on urine microscopy. No emperatriz hematuria.   Should get urinalysis rechecked outpatient after completing treatment for UTI.   May need referral for cystoscopy outpatient if microscopic hematuria still present after completing course of abx for UTI.

## 2020-12-09 NOTE — H&P ADULT - PROBLEM SELECTOR PLAN 2
Home meds: sodium bicarb 650 qd, lasix 20mg qd. Cr 5.52 at presentation (baseline 5).   - trend Cr  - avoid nephrotoxic drugs, renally dose meds  - will consider obtaining urine lytes if not improving Normocytic, Hgb at presentation 8.2 (baseline ~9). Fe studies 8/2020 TIBC/ferritin WNL, HHA denies hematuria, hematochezia, melena or hematemesis. Most likely anemia of chronic disease due to CKD stage 5.    - trend CBC  - maintain active T&S  - transfuse if Hgb <7 On admission, Alk Phos elev to 157. AST 10 and ALT 10. Patient without any abdominal pain, non-tender on exam. No N/V, or changes in BM noted by HHA. No history of liver or gallbladder-related diagnoses.   - trend alk phos No urinary symptoms on admission but UA notable for moderate leukocyte esterase, moderate blood, many WBC, and bacteria present. VSS, afebrile, no leukocytosis. Possibly contributing to episode of dizziness and appearing slightly altered.  - f/u UCx  - start ceftriaxone 1g IV q24h for empiric coverage  - continue to monitor for any other symptoms

## 2020-12-09 NOTE — H&P ADULT - PROBLEM SELECTOR PLAN 5
Home med: amlodipine 2.5 qd - c/w atorvastatin 40mg PO qd Home med: donezepil 5mg qhs. HA notes patient at baseline mentation upon presentation : awake, alert and slightly confused in conversation. AAOx1 to self only.  - continue to monitor for any changes in mentation  - continue home memantine Home meds: sodium bicarb 650 qd, lasix 20mg qd. BUN 73 and Cr 5.52 at presentation (baseline Cr ~5). Followed by nephrologist Dr. Ashraf.  - trend Cr  - encourage continued PO intake  - avoid nephrotoxic drugs, renally dose meds  - will consider obtaining urine lytes if not improving Normocytic, Hgb at presentation 8.2 (baseline ~9). Fe studies 8/2020 TIBC/ferritin WNL, HHA denies hematuria, hematochezia, melena or hematemesis. No obvious signs or symptoms of acute bleed at this time. Most likely 2/2 anemia of chronic disease due to CKD stage 5.    - trend CBC  - maintain active T&S  - transfuse if Hgb <7

## 2020-12-09 NOTE — ED ADULT NURSE NOTE - NSIMPLEMENTINTERV_GEN_ALL_ED
Implemented All Fall with Harm Risk Interventions:  [place] to call system. Call bell, personal items and telephone within reach. Instruct patient to call for assistance. Room bathroom lighting operational. Non-slip footwear when patient is off stretcher. Physically safe environment: no spills, clutter or unnecessary equipment. Stretcher in lowest position, wheels locked, appropriate side rails in place. Provide visual cue, wrist band, yellow gown, etc. Monitor gait and stability. Monitor for mental status changes and reorient to person, place, and time. Review medications for side effects contributing to fall risk. Reinforce activity limits and safety measures with patient and family. Provide visual clues: red socks.

## 2020-12-09 NOTE — H&P ADULT - PROBLEM SELECTOR PROBLEM 3
Alzheimer disease Chronic kidney disease, stage 5 Anemia Alkaline phosphatase elevation Microscopic hematuria

## 2020-12-09 NOTE — DISCHARGE NOTE PROVIDER - NSDCFUADDAPPT_GEN_ALL_CORE_FT
Please note that the office of your Family Medicine Provider, Dr. Wilbert Morrison will contact you to schedule an appointment within 2 weeks of discharge. The office of Dr. Morrison will contact you from [phone].    Appointment was facilitated by Ms. ZANA Montero, Referral Coordinator. Please note that the office of your Family Medicine Provider, Dr. Wilbert Morrison will contact you to schedule an appointment within 1 week of discharge. The office of Dr. Morrison will contact you from [phone].    Appointment was facilitated by Ms. ZANA Montero, Referral Coordinator. 1. Please note that the office of your Family Medicine Provider, Dr. Wilbert Morrison will contact you to schedule an appointment within 1 week of discharge. The office of Dr. Morrison will contact you from [phone]. This was communicated with daughter and home health aid.    2. Please follow up with Dr. Ashraf for further evaluation of the blood noted in your urine. 1. Please note that the office of your Family Medicine Provider, Dr. Wilbert Morrison will contact you to schedule an appointment within 1 week of discharge. The office of Dr. Morrison will contact you from [phone]. This was communicated with daughter and home health aid.    2. Please follow up with Nephrologist Dr. Ashraf for further evaluation of the blood noted in your urine. Appointment scheduled as above.

## 2020-12-09 NOTE — H&P ADULT - PROBLEM SELECTOR PROBLEM 7
Nutrition, metabolism, and development symptoms Urinary retention Hypertension Hyperlipidemia Alzheimer disease

## 2020-12-09 NOTE — H&P ADULT - NSHPSOCIALHISTORY_GEN_ALL_CORE
Lives at home with wife. Has 24 hour home health aid services. Ambulates with walker. No hx of smoking, EtOH, or drug use.

## 2020-12-09 NOTE — H&P ADULT - PROBLEM SELECTOR PLAN 8
----- Message from KENROY Malhotra sent at 10/7/2020  1:34 PM CDT -----  Please notify the patient of excellent lipid results and relatively stable CMP and CBC results. Appears just slightly dehydrated which may be because she was fasting for labs today however just remind her to make sure she's drinking enough water each day. Follow up as planned. Thanks!   Fluids:  Electrolytes: replete PRN  Nutrition:  Anti-coag: Lovenox 40mg subQ QD  GI prophylaxis: c/w home protonix 20mg PO qd    FULL CODE  Disposition: [facility] Fluids: none  Electrolytes: replete PRN  Nutrition: DASH/TLC, renal restrictions  Anti-coag: Lovenox 40mg subQ QD  GI prophylaxis: protonix 20mg PO qd    FULL CODE  Disposition: [facility] During prior hospitalization (8/18-8/22) for L hip fracture patient had hopper placed by urology, started patient on flomax at that time.  - c/w home flomax 0.4mg qd - c/w amlodipine 2.5mg PO qd - c/w amlodipine 2.5mg PO qd    # Hyperlipidemia  - c/w atorvastatin 40mg PO qd

## 2020-12-09 NOTE — H&P ADULT - PROBLEM SELECTOR PLAN 7
Fluids:  Electrolytes: replete PRN  Nutrition:  Anti-coag:  GI prophylaxis: c/w home protonix 20mg PO qd  Activitity:  Pain control:  Disposition: During prior hospitalization (8/18-8/22) for L hip fracture patient had hopper placed by urology, started patient on flomax at that time  - c/w flomax 0.4mg qd - c/w amlodipine 2.5mg PO qd - c/w atorvastatin 40mg PO qd - c/w amlodipine 2.5mg PO qd    # Hyperlipidemia  - c/w atorvastatin 40mg PO qd Home med: donezepil 5mg qhs. HA notes patient at baseline mentation upon presentation : awake, alert and slightly confused in conversation. AAOx1 to self only.  - continue to monitor for any changes in mentation  - continue home memantine

## 2020-12-09 NOTE — H&P ADULT - PROBLEM SELECTOR PLAN 9
Fluids: none  Electrolytes: replete PRN  Nutrition: DASH/TLC, renal restrictions  Anti-coag: Lovenox 40mg subQ QD  GI prophylaxis: protonix 20mg PO qd    FULL CODE  Disposition: [facility] During prior hospitalization (8/18-8/22) for L hip fracture patient had hopper placed by urology, started patient on flomax at that time.  - c/w home flomax 0.4mg qd Fluids: none  Electrolytes: replete PRN  Nutrition: DASH/TLC, renal restrictions  Anti-coag: Lovenox 40mg subQ QD  GI prophylaxis: protonix 20mg PO qd    FULL CODE  Disposition: [facility] During prior hospitalization (8/18-8/22) for L hip fracture patient had hopper placed by urology, started patient on flomax at that time.  - c/w home flomax 0.4mg qHS

## 2020-12-09 NOTE — DISCHARGE NOTE PROVIDER - HOSPITAL COURSE
#Discharge: do not delete    Patient is __ yo M/F with past medical history of _____  Presented with _____, found to have _____    Problem List/Main Diagnoses (system-based):       Inpatient treatment course:     New medications:     Labs to be followed outpatient:   Exam to be followed outpatient:    #Discharge: do not delete    Patient is an 86 year old man with Alzheimer’s dementia (AAOx1 at baseline), stage IV CKD, chronic anemia, HTN, HLD, hx of prostate cancer, BPH, who was brought in by A due to presyncope episode likely in setting of hypovolemia from UTI.     Problem List/Main Diagnoses (system-based):     1. Presyncope. Dizziness likely secondary to dehydration from UTI. No true syncope, fall, or LOC noted. CTH negative. Improved with fluids.  Received 1 Liter of NS in ED.     2. UTI- diagnosed on UA and with mild symptoms reported by patient. Improved clinically on ceftriaxone, and to complete 7 day course with cefpodoxime, with f/u with PCP.    3. Microscopic hematuria. moderate blood noted on urine microscopy. No emperatriz hematuria. Would repeat urinalysis as outpatient after completion of UTI treatment, and consider cystoscopy if persistent.     4. Normocytic anemia. No evidence of active bleeding, neg iron studies in past and thus likely anemia of chronic disease / kidney disease, mildly macrocytic but low c/f folate/b12 deficiency. Would recommend outpt PCP f/u to monitor and refer to GI as needed for workup of slow bleeding.    5. Stage IV CKD, mild SHARON on arrival likely dry, improved, would continue close PCP/renal follow up.    6. BPH and retention- continue home flomax, Bladder scan here without significant urinary retention (~200cc) likely baseline.    7. Alzheimers dementia- continue home medications.    8. HTN- continue home medications with close PCP follow up.    9. Isolated alkaline phosphatase elevation. Alk Phos 157 with AST/ALT 10. No abdominal pain or tenderness. Would repeat as outpatient.  Inpatient treatment course: As above.    New medications:  Cefpodoxime.    Labs to be followed outpatient: Urinalysis, CBC, CMP  Exam to be followed outpatient: PCP, Nephrology         #Discharge: do not delete    Patient is an 86 year old man with Alzheimer’s dementia (AAOx1 at baseline), stage IV CKD, chronic anemia, HTN, HLD, hx of prostate cancer, BPH, who was brought in by A due to presyncope episode likely in setting of hypovolemia from UTI.     Problem List/Main Diagnoses (system-based):     1. Presyncope. Dizziness likely secondary to dehydration from UTI. No true syncope, fall, or LOC noted. CTH negative. Improved with fluids.  Received 1 Liter of NS in ED.     2. UTI- diagnosed on UA and with mild symptoms reported by patient. Improved clinically on ceftriaxone, and to complete 7 day course with cefpodoxime, with f/u with PCP.    3. Microscopic hematuria. moderate blood noted on urine microscopy. No emperatriz hematuria. Would repeat urinalysis as outpatient after completion of UTI treatment, and consider cystoscopy if persistent. Will see Dr. Ashraf as outpatient for follow up.    4. Normocytic anemia. No evidence of active bleeding, neg iron studies in past and thus likely anemia of chronic disease / kidney disease, mildly macrocytic but low c/f folate/b12 deficiency. Would recommend outpt PCP f/u to monitor and refer to GI as needed for workup of slow bleeding.    5. Stage IV CKD, mild SHARON on arrival likely dry, improved, would continue close PCP/renal follow up.    6. BPH and retention- continue home flomax, Bladder scan here without significant urinary retention (~200cc) likely baseline.    7. Alzheimers dementia- continue home medications.    8. HTN- continue home medications with close PCP follow up.    9. Isolated alkaline phosphatase elevation. Alk Phos 157 with AST/ALT 10. No abdominal pain or tenderness. Would repeat as outpatient.  Inpatient treatment course: As above.    New medications:  Cefpodoxime.    Labs to be followed outpatient: Urinalysis, CBC, CMP  Exam to be followed outpatient: PCP, Nephrology         #Discharge: do not delete    Patient is an 86 year old man with Alzheimer’s dementia (AAOx1 at baseline), stage IV CKD, chronic anemia, HTN, HLD, hx of prostate cancer, BPH, who was brought in by A due to presyncope episode likely in setting of hypovolemia from UTI.     Problem List/Main Diagnoses (system-based):     1. Presyncope. Dizziness likely secondary to dehydration from UTI. No true syncope, fall, or LOC noted. CTH negative. Improved with fluids.  Received 1 Liter of NS in ED.     2. UTI- diagnosed on UA and with mild symptoms reported by patient. Improved clinically on ceftriaxone, and to complete 7 day course with cefpodoxime, with f/u with PCP.    3. Microscopic hematuria. moderate blood noted on urine microscopy. No emperatriz hematuria. Would repeat urinalysis as outpatient after completion of UTI treatment, and consider cystoscopy if persistent. Will see Nephrologist Dr. Ashraf as outpatient for follow up.    4. Normocytic anemia. No evidence of active bleeding, neg iron studies in past and thus likely anemia of chronic disease / kidney disease, mildly macrocytic but low c/f folate/b12 deficiency. Would recommend outpt PCP f/u to monitor and refer to GI as needed for workup of slow bleeding.    5. Stage IV CKD, mild SHARON on arrival likely dry, improved, would continue close PCP/renal follow up.    6. BPH and retention- continue home flomax, Bladder scan here without significant urinary retention (~200cc) likely baseline.    7. Alzheimers dementia- continue home medications.    8. HTN- continue home medications with close PCP follow up.    9. Isolated alkaline phosphatase elevation. Alk Phos 157 with AST/ALT 10. No abdominal pain or tenderness. Would repeat as outpatient.  Inpatient treatment course: As above.    New medications:  Cefpodoxime.    Labs to be followed outpatient: Urinalysis, CBC, CMP  Exam to be followed outpatient: PCP, Nephrology

## 2020-12-09 NOTE — H&P ADULT - ATTENDING COMMENTS
[  ] Please make sure microscopic hematuria is documented on his discharge sum  ***incomplete 86 year old man with Alzheimer’s dementia (AAOx1 at baseline), CKD, HTN, HLD, hx of prostate cancer, BPH, brought in by HHA due to lightheadedness in the bathroom while attempting to transfer from shaving to shower.   # Anemia   CODY in ED – Brown stool  When compared to August CBCs, patient is close to long-term baseline Hgb    # Urinary tract infection  Found to have positive UA (+LE, Many WBCs) [for reference, UA from Sep 2020 when he underwent ortho procedure was negative for LE< and showed <5 WBCs].    Of note, HHJANUSZ Dickey who was at bedside, and home nurse (Home nurse Jhony, [phone]) say that patient was treated for a urinary tract infection in October 2020 after clean catch samples were collected at home. But neither remembers the UCx results or antibiotics given.   History limited by fact that patient is AAOx1 at baseline from Alzheimer’s. However, he does point to the groin area and says “the birds and the bees area [is uncomfortable]”  when asked if he has any discomfort (HHA says that he doesn’t usually complain of discomfort in the groin)   Would treat as complicated UTI ; Continue Ceftriaxone for now; f/u UCx ; Duration of abx for complicated UTI.   # SHARON   Cr 5.52 fompared to 4.61 at discharge last admission  Cr has been ~5.50 in on prior admissions.   Please make patient a follow up appt with his nephrologist  # Microscopic hematuria   Should get repeat UA outpatient after completing course of abx for UTI.   Please make sure microscopic hematuria is documented on his discharge summary for his outpatient provider.   # Transitions of care   Patient has a PCP who makes house calls (Tamiko Atkins NP) Please make patient a f/u appt before dc  Please make patient a f/u appt with his nephrologist before dc

## 2020-12-09 NOTE — H&P ADULT - PROBLEM SELECTOR PLAN 4
- c/w atorvastatin 40mg qd Home med: donezepil 5mg qhs. HA notes patient at baseline mentation upon presentation : awake, alert and slightly confused in conversation. AAOx1 to self only.  - continue to monitor for any changes in mentation Home med: donezepil 5mg qhs. HA notes patient at baseline mentation upon presentation : awake, alert and slightly confused in conversation. AAOx1 to self only.  - continue to monitor for any changes in mentation  - continue home memantine Home meds: sodium bicarb 650 qd, lasix 20mg qd. BUN 73 and Cr 5.52 at presentation (baseline Cr ~5). Followed by nephrologist Dr. Ashraf.  - trend Cr  - encourage continued PO intake  - avoid nephrotoxic drugs, renally dose meds  - will consider obtaining urine lytes if not improving Normocytic, Hgb at presentation 8.2 (baseline ~9). Fe studies 8/2020 TIBC/ferritin WNL, HHA denies hematuria, hematochezia, melena or hematemesis. Most likely anemia of chronic disease due to CKD stage 5.    - trend CBC  - maintain active T&S  - transfuse if Hgb <7 On admission, Alk Phos elev to 157. AST 10 and ALT 10. Patient without any abdominal pain, non-tender on exam. No N/V, or changes in BM noted by HHA. No history of liver or gallbladder-related diagnoses.   - trend alk phos

## 2020-12-09 NOTE — H&P ADULT - PROBLEM SELECTOR PLAN 6
During prior hospitalization (8/18-8/22) for L hip fracture patient had hopper placed by urology, started patient on flomax at that time  - c/w flomax 0.4mg qd - c/w amlodipine 2.5mg PO qd - c/w atorvastatin 40mg PO qd Home med: donezepil 5mg qhs. HA notes patient at baseline mentation upon presentation : awake, alert and slightly confused in conversation. AAOx1 to self only.  - continue to monitor for any changes in mentation  - continue home memantine BUN 73 and Cr 5.52 at presentation (baseline Cr ~4.5-5, although Cr appears to fluctuate based on chart review). Followed by nephrologist Dr. Ashraf. Possible SHARON on CKD 2/2 infection vs possible mild dehydration prior to admission. S/p 1L NS in ED.   - continue home sodium bicarb 650mg TID  - trend Cr  - encourage continued PO intake  - avoid nephrotoxic drugs, renally dose meds  - will consider obtaining urine lytes if not improving

## 2020-12-09 NOTE — ED ADULT NURSE NOTE - OBJECTIVE STATEMENT
Patient BIBEMS from home with hx of Alzheimer's accomp by A who reports patient c/o dizziness this AM and last night. As per HHA, "he was shaving and told me he felt dizzy and we helped him to the floor and he started to shake a bit but then he was fine". As per A, patient's speech and behavior at baseline. Denies fevers/chills/decrease PO intake. Patient following verbal commands appropriately. No facial droop or slurred speech noted.

## 2020-12-09 NOTE — H&P ADULT - ASSESSMENT
85yo M w/ PMH Alzheimer Dementia (AAOx1 at baseline), CKD, HTN, HLD, Prostate CA, BPH, is brought in to ED by home health aid due to episode of dizziness this morning, now resolved. Admitted for further management.

## 2020-12-09 NOTE — ED ADULT NURSE NOTE - CHIEF COMPLAINT QUOTE
pt BIBEMS, arriving with home aid for "dizziness since last night". per aid, pt received report from previous aid that he was dizzy before going to bed. aid states pt is "his usual self", despite some generalized weakness with postural change. hx alzheimers and HTN. per ems, pt hypotensive on initial encounter, bp was 88/44, currently normotensive. pt speech is clear, no unilateral weakness. Denies cp, sob, n/v/d, fever, cough or chills. no fall or LOC.

## 2020-12-09 NOTE — H&P ADULT - NSHPPHYSICALEXAM_GEN_ALL_CORE
.  VITAL SIGNS:  T(C): 36.4 (12-09-20 @ 14:25), Max: 36.4 (12-09-20 @ 14:25)  T(F): 97.5 (12-09-20 @ 14:25), Max: 97.5 (12-09-20 @ 14:25)  HR: 90 (12-09-20 @ 14:25) (70 - 90)  BP: 163/77 (12-09-20 @ 14:25) (123/66 - 163/77)  BP(mean): --  RR: 18 (12-09-20 @ 14:25) (18 - 18)  SpO2: 99% (12-09-20 @ 14:25) (97% - 99%)  Wt(kg): --    PHYSICAL EXAM:    Constitutional: WDWN resting comfortably in bed; NAD  Head: NC/AT  Eyes: PERRL, EOMI, clear conjunctiva  ENT: no nasal discharge; uvula midline, no oropharyngeal erythema or exudates; MMM  Neck: supple; no JVD or thyromegaly  Respiratory: CTA B/L; no W/R/R, no retractions  Cardiac: +S1/S2; RRR; no M/R/G; PMI non-displaced  Gastrointestinal: soft, NT/ND; no rebound or guarding; +BSx4  Genitourinary: normal external genitalia  Back: spine midline, no bony tenderness or step-offs; no CVAT B/L  Extremities: WWP, no clubbing or cyanosis; no peripheral edema  Musculoskeletal: NROM x4; no joint swelling, tenderness or erythema  Vascular: 2+ radial, femoral, DP/PT pulses B/L  Dermatologic: skin warm, dry and intact; no rashes, wounds, or scars  Lymphatic: no submandibular or cervical LAD  Neurologic: AAOx3; CNII-XII grossly intact; no focal deficits  Psychiatric: affect and characteristics of appearance, verbalizations, behaviors are appropriate .  VITAL SIGNS:  T(C): 36.4 (12-09-20 @ 14:25), Max: 36.4 (12-09-20 @ 14:25)  T(F): 97.5 (12-09-20 @ 14:25), Max: 97.5 (12-09-20 @ 14:25)  HR: 90 (12-09-20 @ 14:25) (70 - 90)  BP: 163/77 (12-09-20 @ 14:25) (123/66 - 163/77)  BP(mean): --  RR: 18 (12-09-20 @ 14:25) (18 - 18)  SpO2: 99% (12-09-20 @ 14:25) (97% - 99%)  Wt(kg): --    PHYSICAL EXAM:    Constitutional: Awake and alert, elderly male; laying comfortably in bed; pleasant, confused but conversational  Head: NC/AT  Eyes: PERRL, EOMI, clear conjunctiva, sclera anicteric  ENT: no nasal discharge; uvula midline, no oropharyngeal erythema or exudates; MMM  Neck: supple; no JVD noted  Respiratory: CTA B/L; no W/R/R, no retractions  Cardiac: +S1/S2; RRR; no M/R/G; PMI non-displaced  Gastrointestinal: soft, NT/ND; no rebound or guarding; +BSx4  Extremities: WWP, no clubbing or cyanosis; no peripheral edema  Musculoskeletal: NROM x4; no joint swelling, tenderness or erythema  Vascular: 2+ radial, femoral, DP/PT pulses B/L  Dermatologic: skin warm, dry and intact; no rashes, wounds, or other lesions noted  Lymphatic: no submandibular or cervical LAD  Neurologic: AAOx1 to self only; CNII-XII grossly intact; no focal deficits

## 2020-12-09 NOTE — ED ADULT TRIAGE NOTE - CHIEF COMPLAINT QUOTE
pt BIBEMS, arriving with home aid for "dizziness since last night". per aid, pt has been dizzy since last night with generalized body weakness. hx alzheimers and HTN. per EMS, dizziness worse with ambulation. per ems, pt hypotensive on initial encounter, bp was 88/44, currently normotensive. pt speech is clear, no unilateral weakness. Denies cp, sob, n/v/d, fever, cough or chills. no fall or LOC. pt BIBEMS, arriving with home aid for "dizziness since last night". per aid, pt received report from previous aid that he was dizzy before going to bed. aid states pt is "his usual self", despite some generalized weakness with postural change. hx alzheimers and HTN. per ems, pt hypotensive on initial encounter, bp was 88/44, currently normotensive. pt speech is clear, no unilateral weakness. Denies cp, sob, n/v/d, fever, cough or chills. no fall or LOC.

## 2020-12-09 NOTE — H&P ADULT - PROBLEM SELECTOR PROBLEM 2
Chronic kidney disease, stage 5 Anemia Alkaline phosphatase elevation R/O UTI (urinary tract infection) UTI (urinary tract infection)

## 2020-12-09 NOTE — DISCHARGE NOTE PROVIDER - NSDCCPCAREPLAN_GEN_ALL_CORE_FT
PRINCIPAL DISCHARGE DIAGNOSIS  Diagnosis: Bacterial UTI  Assessment and Plan of Treatment:       SECONDARY DISCHARGE DIAGNOSES  Diagnosis: Anemia of chronic disease  Assessment and Plan of Treatment:      PRINCIPAL DISCHARGE DIAGNOSIS  Diagnosis: Bacterial UTI  Assessment and Plan of Treatment: You were noted to have a urinary tract infection (UTI) during your admission to [facility]. This was found based on your symptom profile (urinary frequency and/or pain on urination) as well as your urine tested for any infectious organisms. You were received antibiotics throughout your hospital course. Please follow-up with your primary care physician. Please continue to take CEFPODOXIME twice a day for 5 days (10 pills). Should you notice any symptoms such as but not limited to: unrelenting/severe fever (temperatuer >103 F and/or lasting >3 days), worsening pain on urination, urinary discharge, increased urinary frequency, chills, severe flank/lower back pain, or bloody urine, please return to the emergency department for interval evaluation.      SECONDARY DISCHARGE DIAGNOSES  Diagnosis: Postural dizziness with presyncope  Assessment and Plan of Treatment: Likely from dehydration from urinary tract infection. Received fluids. Please treat UTI as above and follow up with outpatient provider.    Diagnosis: Alkaline phosphatase elevation  Assessment and Plan of Treatment: Elevation of an enzyme called alkaline phosphatase. No abdominal pain or tenderness. Would repeat as outpatient for further evaluation.    Diagnosis: Microscopic hematuria  Assessment and Plan of Treatment: Microscopic hematuria. moderate blood noted on urine microscopy. No emperatriz hematuria. Would repeat urinalysis as outpatient after completion of UTI treatment, and consider cystoscopy if persistent. Please follow up with PCP and/or nephrology.    Diagnosis: Anemia of chronic disease  Assessment and Plan of Treatment: Anemia is the medical term for when a person has too few red blood cells. Red blood cells are the cells in your blood that carry oxygen. If you have too few red blood cells, your body does not get all the oxygen it needs. Most people with anemia have no symptoms. They find out they have it after their doctor does blood tests for another reason. People who do have symptoms might feel tired or weak, especially if they try to exercise or have headaches. If you experience these symptoms you should see your primary care provider for further evaluation.   No evidence of active bleeding, neg iron studies in past and thus likely anemia of chronic disease / kidney disease. Please follow up with Primary Care Provider to monitor and refer to GI as needed for further workup of potential slow bleeding.

## 2020-12-09 NOTE — H&P ADULT - PROBLEM SELECTOR PLAN 10
Fluids: none  Electrolytes: replete PRN  Nutrition: DASH/TLC, renal restrictions  Anti-coag: Lovenox 40mg subQ QD  GI prophylaxis: protonix 20mg PO qd    FULL CODE  Disposition: [facility] Red blood cells on urine microscopy. No emperatriz hematuria.   Should get urinalysis rechecked outpatient after completing treatment for UTI.   May need referral for cystoscopy outpatient if microscopic hematuria still present after completing course of abx for UTI. Fluids: none  Electrolytes: replete PRN  Nutrition: DASH/TLC, renal restrictions  Anti-coag: Lovenox 40mg subQ QD  GI prophylaxis: protonix 20mg PO qd    FULL CODE  Disposition: [facility]

## 2020-12-09 NOTE — H&P ADULT - PROBLEM SELECTOR PLAN 1
Normocytic, Hgb at presentation 8.2 (baseline 9). Fe studies 8/2020 TIBC/ferritin WNL, HHA denies hematuria, hematochezia, melena or hematemesis. Most likely anemia of chronic disease due to CKD 5   - trend CBC  - maintain active T&S  - transfuse if Hgb <7 Patient presenting after an episode of dizziness lasting a few minutes. Appeared to be "going down" per home health aid but had no fall or LOC. CTH on admission negative. CXR without any acute pathologies. VSS. Patient at baseline mentation (AAOx1 to self only). HHA reports patient with good appetite and adequate PO intake. Possibly 2/2 orthostatic hypotension as patient was mid transfer in the bathroom when dizziness occurred. Patient presenting after an episode of dizziness lasting a few minutes. Appeared to be "going down" per home health aid but had no fall or LOC. Patient at baseline mentation (AAOx1 to self only and pleasantly confused). Physical exam benign. HHA reports patient with good appetite and adequate PO intake. Patient without any other symptoms and unable to volunteer further history. CTH on admission negative. CXR without any acute pathologies. VSS. No leukocytosis. Is anemic but Hb around baseline per chart review. Possibly 2/2 orthostatic hypotension as patient was mid transfer in the bathroom when dizziness occurred, vs anemia.  - f/u orthostatics  - monitor vitals  - s/p 1L NS in ED, appears euvolemic at this time, hold off on further IVF for now  - rest of management as below Patient presenting after an episode of dizziness lasting a few minutes. Appeared to be "going down" per home health aid but had no fall or LOC. Patient at baseline mentation (AAOx1 to self only and pleasantly confused). Physical exam benign. HHA reports patient with good appetite and adequate PO intake. Patient without any other symptoms and unable to volunteer further history. CTH on admission negative. CXR without any acute pathologies. VSS. No leukocytosis. Is anemic but Hb around baseline per chart review. Possibly 2/2 UTI vs orthostatic hypotension vs anemia.  - UA positive, f/u UCx  - f/u orthostatics  - monitor vitals  - s/p 1L NS in ED, appears euvolemic at this time, hold off on further IVF for now  - rest of management as below Patient presenting after an episode of dizziness lasting a few minutes. Appeared to be "going down" per home health aid but had no fall or LOC. Patient at baseline mentation (AAOx1 to self only and pleasantly confused). Physical exam benign. HHA reports patient with good appetite and adequate PO intake. Patient without any other symptoms and unable to volunteer further history. CTH on admission negative. CXR without any acute pathologies. VSS. No leukocytosis. Is anemic but Hb around baseline per chart review. UA is notably positive. Possibly 2/2 UTI vs orthostatic hypotension vs anemia.  - UA positive, f/u UCx  - f/u orthostatics  - monitor vitals  - s/p 1L NS in ED, appears euvolemic at this time, hold off on further IVF for now  - rest of management as below Patient presenting after an episode of dizziness lasting a few minutes. Appeared to be "going down" per home health aid but had no fall or LOC. Patient at baseline mentation (AAOx1 to self only and pleasantly confused). Physical exam benign. HHA reports patient with good appetite and adequate PO intake. Patient without any other symptoms and unable to volunteer further history. CTH on admission negative. CXR without any acute pathologies. VSS. No leukocytosis. Is anemic but Hb around baseline per chart review. UA is notably positive. Possibly 2/2 UTI vs orthostatic hypotension vs anemia.  - UA positive, f/u UCx  - orthostatics in AM  - monitor vitals  - s/p 1L NS in ED, appears euvolemic at this time, hold off on further IVF for now  - rest of management as below

## 2020-12-09 NOTE — H&P ADULT - HISTORY OF PRESENT ILLNESS
INCOMPLETE    "86 year old male with history of Dementia presents to ED from home with aid secondary to concern for complaint of feeling dizzy this morning while transferring in bathroom from shaving to shower.  Patient's aid who is present at bedside in ED notes she sat him down and he felt improved within a few seconds.  She notes he is at his baseline mentation on arrival to ED.  He is awake, alert and slightly confused in conversation which aid states is typical of him at baseline.  He is able to move all extremities and is easily arousable to voice on arrival.  Additional history is limited as patient does not provide reliable history.  Aid denies any additional acute complaints or concerns - she notes he has 24 hour aid care at home and she is frequently involved in his care."    ED Vitals:  ED Labs:  ED Imaging:  ED EKG:  ED Course: INCOMPLETE    "86 year old male with history of Dementia presents to ED from home with aid secondary to concern for complaint of feeling dizzy this morning while transferring in bathroom from shaving to shower.  Patient's aid who is present at bedside in ED notes she sat him down and he felt improved within a few seconds.  She notes he is at his baseline mentation on arrival to ED.  He is awake, alert and slightly confused in conversation which aid states is typical of him at baseline.  He is able to move all extremities and is easily arousable to voice on arrival.  Additional history is limited as patient does not provide reliable history.  Aid denies any additional acute complaints or concerns - she notes he has 24 hour aid care at home and she is frequently involved in his care."    ED Vitals: 97.4 degrees, HR 74, 123/66, RR18, SpO2 97% RA  ED Labs: WBC 7.60, Hb 8.2 (baseline appears ~8-9 on chart review), BUN 73, Cr 5.52 (baseline ~4.5, ranging 4.3-5.4 since 8/2020), Trp 0.04 -> 0.03, AST 10, ALT 10, Alk Phos 157  ED Imaging: CT Head negative for acute intracranial pathology, CXR without any acute focal opacities  ED EKG: NSR, no ischemic changes  ED Course: INCOMPLETE  85yo M w/ PMH Alzheimer Dementia (AAOx1 at baseline), CKD, HTN, HLD, Prostate CA, BPH, is brought in to ED by home health aid due to feeling dizzy this morning in the bathroom while attempting to transfer from shaving to the shower. Patient is awake and alert but pleasantly confused during conversation and unable to volunteer history regarding what brought him to the hospital. History obtained from home health aid. This morning, patient reported to the home health aid that he felt dizzy and per home health aid at bedside, appeared to be "going down" so she eased him to the toilet. Patient did not have any LOC or fall during this time. Appeared to be shaking a little while reporting the dizziness. Home health aid called 911 and when paramedics arrived, patient was reportedly back to baseline, mentating well, and denying having any symptoms. Per home health aid, she had heard from patient's other home health aid that yesterday patient also mentioned feeling dizzy for a few minutes before returning back to normal. Patient has a good appetite and has been having adequate PO intake. No recent changes in medications. No recent illnesses or sick contacts. Patient has no complaints and does not appear to be in any pain. Patient has 24hr care at home and has 4 home health aids that rotate in his care.     ED Vitals: 97.4 degrees, HR 74, 123/66, RR18, SpO2 97% RA  ED Labs: WBC 7.60, Hb 8.2 (baseline appears ~8-9 on chart review), BUN 73, Cr 5.52 (baseline ~4.5, ranging 4.3-5.4 since 8/2020), Trp 0.04 -> 0.03, AST 10, ALT 10, Alk Phos 157  ED Imaging: CT Head negative for acute intracranial pathology, CXR without any acute focal opacities  ED EKG: NSR, no ischemic changes  ED Course: 1L NS 87yo M w/ PMH Alzheimer Dementia (AAOx1 at baseline), CKD, HTN, HLD, Prostate CA, BPH, is brought in to ED by home health aid due to feeling dizzy this morning in the bathroom while attempting to transfer from shaving to the shower. Patient is awake and alert but pleasantly confused during conversation and unable to volunteer history regarding what brought him to the hospital. History obtained from home health aid. This morning, patient reported to the home health aid that he felt dizzy and per home health aid at bedside, appeared to be "going down" so she eased him to the toilet. Patient did not have any LOC or fall during this time. Appeared to be shaking a little while reporting the dizziness. Home health aid called 911 and when paramedics arrived, patient was reportedly back to baseline, mentating well, and denying having any symptoms. Per home health aid, she had heard from patient's other home health aid that yesterday patient also mentioned feeling dizzy for a few minutes before returning back to normal. Patient has a good appetite and has been having adequate PO intake. No recent changes in medications. No recent illnesses or sick contacts. Patient has no complaints and does not appear to be in any pain. Patient has 24hr care at home and has 4 home health aids that rotate in his care.     ED Vitals: 97.4 degrees, HR 74, 123/66, RR18, SpO2 97% RA  ED Labs: WBC 7.60, Hb 8.2 (baseline appears ~8-9 on chart review), BUN 73, Cr 5.52 (baseline ~4.5, ranging 4.3-5.4 since 8/2020), Trp 0.04 -> 0.03, AST 10, ALT 10, Alk Phos 157  ED Imaging: CT Head negative for acute intracranial pathology, CXR without any acute focal opacities  ED EKG: NSR, no ischemic changes  ED Course: 1L NS 87yo M w/ PMH Alzheimer Dementia (AAOx1 at baseline), CKD, HTN, HLD, Prostate CA, BPH, is brought in to ED by home health aid due to feeling dizzy this morning in the bathroom while attempting to transfer from shaving to the shower. Patient is awake and alert but pleasantly confused during conversation and unable to volunteer history regarding what brought him to the hospital. History obtained from home health aid. This morning, patient reported to the home health aid that he felt dizzy and per home health aid at bedside, appeared to be "going down" so she eased him to the toilet. Patient did not have any LOC or fall during this time. Appeared to be shaking a little while reporting the dizziness. Home health aid called 911 and when paramedics arrived, patient was reportedly back to baseline, mentating well, and denying having any symptoms. Per home health aid, she had heard from patient's other home health aid that yesterday patient also mentioned feeling dizzy for a few minutes before returning back to normal. Patient has a good appetite and has been having adequate PO intake. No fevers, chills, CP, SOB, cough, abdominal pain, N/V/D. No changes in BMs or urination. No recent changes in medications. No recent illnesses or sick contacts. Patient has no complaints and does not appear to be in any pain. Patient has 24hr care at home and has 4 home health aids that rotate in his care.    ED Vitals: 97.4 degrees, HR 74, 123/66, RR18, SpO2 97% RA  ED Labs: WBC 7.60, Hb 8.2 (baseline appears ~8-9 on chart review), BUN 73, Cr 5.52 (baseline ~4.5, ranging 4.3-5.4 since 8/2020), Trp 0.04 -> 0.03, AST 10, ALT 10, Alk Phos 157  ED Imaging: CT Head negative for acute intracranial pathology, CXR without any acute focal opacities  ED EKG: NSR, no ischemic changes  ED Course: 1L NS

## 2020-12-09 NOTE — DISCHARGE NOTE PROVIDER - CARE PROVIDER_API CALL
Wilbert Morrison  Phone: [phone]  Fax: (   )    -  Established Patient  Follow Up Time: 1 week   SUSHANT Morrison, [facility]  FAMILY MEDICINE  [address] [place] [address]  Phone: [phone]  Fax: (   )    -  Follow Up Time:    SUSHANT Morrison, Wilbert  FAMILY MEDICINE  [address] [place] [address]  Phone: [phone]  Fax: (   )    -  Follow Up Time:     Alli Ashraf)  Internal Medicine; Nephrology  [address] [address]  Phone: [phone]  Fax: [phone]  Scheduled Appointment: 12/31/2020 10:30 AM   Alli Ashraf)  Internal Medicine; Nephrology  [address], [address] [place] [address]  Phone: [phone]  Fax: [phone]  Established Patient  Scheduled Appointment: 12/31/2020 10:30 AM    SUSHANT Morrison, [facility] MEDICINE  [address] [place] [address]  Phone: [phone]  Fax: (   )    -  Established Patient  Follow Up Time:

## 2020-12-09 NOTE — ED PROVIDER NOTE - CLINICAL SUMMARY MEDICAL DECISION MAKING FREE TEXT BOX
Patient presents to ED with concern for feeling dizzy today per caretaker.  Patient was helped to seated position and caretaker states he quickly began to feel better.  She called EMS and he was transported to ED for further evaluation.   On arrival to ED, patient is non toxic in appearance.  He is at baseline per caretaker.  Labs reviewed.  Anemia, elevated trop and acute on chronic renal insuff noted.  Patient follows simple commands, though does not answer ROS questions reliably.  EKG non ischemic.  Renal insuff noted, however patient with elevated Cr from what appears to be his baseline.  Not on dialysis.  Anemia also noted - no concern for active bleeding.  Given labs and concern for anemia, dehydration and not feeling himself today, will plan for admission for hydration and monitoring.  Patient's health aid aware of plan and in agreement.  Will admit to medicine at this time given repeat trop stable and no EKG changes.

## 2020-12-09 NOTE — H&P ADULT - NSHPLABSRESULTS_GEN_ALL_CORE
.  LABS:                         8.2    7.60  )-----------( 165      ( 09 Dec 2020 10:33 )             25.8     12-09    141  |  107  |  73<H>  ----------------------------<  139<H>  4.3   |  18<L>  |  5.52<H>    Ca    9.0      09 Dec 2020 10:33  Mg     2.1     12-09    TPro  6.6  /  Alb  3.9  /  TBili  0.2  /  DBili  x   /  AST  10  /  ALT  10  /  AlkPhos  157<H>  12-09        CARDIAC MARKERS ( 09 Dec 2020 13:38 )  x     / 0.03 ng/mL / x     / x     / x      CARDIAC MARKERS ( 09 Dec 2020 10:33 )  x     / 0.04 ng/mL / x     / x     / x            RADIOLOGY, EKG & ADDITIONAL TESTS: Reviewed.   < from: CT Head No Cont (12.09.20 @ 12:09) >  IMPRESSION:  No acute intracranial pathology. Stable exam compared to 09/04/2020.    < from: Xray Chest 1 View-PORTABLE IMMEDIATE (Xray Chest 1 View-PORTABLE IMMEDIATE .) (12.09.20 @ 11:27) >  impression: Cardiac magnification. No acute focal opacity. Stable bony structures. .  LABS:                         8.2    7.60  )-----------( 165      ( 09 Dec 2020 10:33 )             25.8     12-09    141  |  107  |  73<H>  ----------------------------<  139<H>  4.3   |  18<L>  |  5.52<H>    Ca    9.0      09 Dec 2020 10:33  Mg     2.1     12-09    TPro  6.6  /  Alb  3.9  /  TBili  0.2  /  DBili  x   /  AST  10  /  ALT  10  /  AlkPhos  157<H>  12-09        CARDIAC MARKERS ( 09 Dec 2020 13:38 )  x     / 0.03 ng/mL / x     / x     / x      CARDIAC MARKERS ( 09 Dec 2020 10:33 )  x     / 0.04 ng/mL / x     / x     / x        Urinalysis (12.09.20 @ 16:06)    Glucose Qualitative, Urine: 100    Blood, Urine: Moderate    pH Urine: 6.5    Color: Yellow    Urine Appearance: Clear    Bilirubin: Negative    Ketone - Urine: NEGATIVE    Specific Gravity: 1.020    Protein, Urine: 100 mg/dL    Urobilinogen: 0.2 E.U./dL    Nitrite: NEGATIVE    Leukocyte Esterase Concentration: Moderate      RADIOLOGY, EKG & ADDITIONAL TESTS: Reviewed.   < from: CT Head No Cont (12.09.20 @ 12:09) >  IMPRESSION:  No acute intracranial pathology. Stable exam compared to 09/04/2020.    < from: Xray Chest 1 View-PORTABLE IMMEDIATE (Xray Chest 1 View-PORTABLE IMMEDIATE .) (12.09.20 @ 11:27) >  impression: Cardiac magnification. No acute focal opacity. Stable bony structures.

## 2020-12-10 ENCOUNTER — TRANSCRIPTION ENCOUNTER (OUTPATIENT)
Age: 85
End: 2020-12-10

## 2020-12-10 VITALS
DIASTOLIC BLOOD PRESSURE: 80 MMHG | HEART RATE: 71 BPM | TEMPERATURE: 98 F | SYSTOLIC BLOOD PRESSURE: 118 MMHG | RESPIRATION RATE: 18 BRPM | OXYGEN SATURATION: 99 %

## 2020-12-10 LAB
ANION GAP SERPL CALC-SCNC: 15 MMOL/L — SIGNIFICANT CHANGE UP (ref 5–17)
BLD GP AB SCN SERPL QL: NEGATIVE — SIGNIFICANT CHANGE UP
BUN SERPL-MCNC: 65 MG/DL — HIGH (ref 7–23)
CALCIUM SERPL-MCNC: 9.2 MG/DL — SIGNIFICANT CHANGE UP (ref 8.4–10.5)
CHLORIDE SERPL-SCNC: 109 MMOL/L — HIGH (ref 96–108)
CO2 SERPL-SCNC: 17 MMOL/L — LOW (ref 22–31)
CREAT SERPL-MCNC: 5.44 MG/DL — HIGH (ref 0.5–1.3)
CULTURE RESULTS: SIGNIFICANT CHANGE UP
GLUCOSE SERPL-MCNC: 98 MG/DL — SIGNIFICANT CHANGE UP (ref 70–99)
HCT VFR BLD CALC: 24.6 % — LOW (ref 39–50)
HGB BLD-MCNC: 7.8 G/DL — LOW (ref 13–17)
MAGNESIUM SERPL-MCNC: 2 MG/DL — SIGNIFICANT CHANGE UP (ref 1.6–2.6)
MCHC RBC-ENTMCNC: 31.7 GM/DL — LOW (ref 32–36)
MCHC RBC-ENTMCNC: 31.7 PG — SIGNIFICANT CHANGE UP (ref 27–34)
MCV RBC AUTO: 100 FL — SIGNIFICANT CHANGE UP (ref 80–100)
NRBC # BLD: 0 /100 WBCS — SIGNIFICANT CHANGE UP (ref 0–0)
PHOSPHATE SERPL-MCNC: 4 MG/DL — SIGNIFICANT CHANGE UP (ref 2.5–4.5)
PLATELET # BLD AUTO: 169 K/UL — SIGNIFICANT CHANGE UP (ref 150–400)
POTASSIUM SERPL-MCNC: 3.9 MMOL/L — SIGNIFICANT CHANGE UP (ref 3.5–5.3)
POTASSIUM SERPL-SCNC: 3.9 MMOL/L — SIGNIFICANT CHANGE UP (ref 3.5–5.3)
RBC # BLD: 2.46 M/UL — LOW (ref 4.2–5.8)
RBC # FLD: 14.2 % — SIGNIFICANT CHANGE UP (ref 10.3–14.5)
RH IG SCN BLD-IMP: POSITIVE — SIGNIFICANT CHANGE UP
SODIUM SERPL-SCNC: 141 MMOL/L — SIGNIFICANT CHANGE UP (ref 135–145)
SPECIMEN SOURCE: SIGNIFICANT CHANGE UP
WBC # BLD: 6.01 K/UL — SIGNIFICANT CHANGE UP (ref 3.8–10.5)
WBC # FLD AUTO: 6.01 K/UL — SIGNIFICANT CHANGE UP (ref 3.8–10.5)

## 2020-12-10 PROCEDURE — 99233 SBSQ HOSP IP/OBS HIGH 50: CPT | Mod: GC

## 2020-12-10 PROCEDURE — 85025 COMPLETE CBC W/AUTO DIFF WBC: CPT

## 2020-12-10 PROCEDURE — 99285 EMERGENCY DEPT VISIT HI MDM: CPT | Mod: 25

## 2020-12-10 PROCEDURE — 71045 X-RAY EXAM CHEST 1 VIEW: CPT

## 2020-12-10 PROCEDURE — 85027 COMPLETE CBC AUTOMATED: CPT

## 2020-12-10 PROCEDURE — 36415 COLL VENOUS BLD VENIPUNCTURE: CPT

## 2020-12-10 PROCEDURE — 70450 CT HEAD/BRAIN W/O DYE: CPT

## 2020-12-10 PROCEDURE — 80053 COMPREHEN METABOLIC PANEL: CPT

## 2020-12-10 PROCEDURE — 93005 ELECTROCARDIOGRAM TRACING: CPT

## 2020-12-10 PROCEDURE — 83735 ASSAY OF MAGNESIUM: CPT

## 2020-12-10 PROCEDURE — U0003: CPT

## 2020-12-10 PROCEDURE — 82962 GLUCOSE BLOOD TEST: CPT

## 2020-12-10 PROCEDURE — 84484 ASSAY OF TROPONIN QUANT: CPT

## 2020-12-10 PROCEDURE — 87086 URINE CULTURE/COLONY COUNT: CPT

## 2020-12-10 PROCEDURE — 86900 BLOOD TYPING SEROLOGIC ABO: CPT

## 2020-12-10 PROCEDURE — 80048 BASIC METABOLIC PNL TOTAL CA: CPT

## 2020-12-10 PROCEDURE — 86850 RBC ANTIBODY SCREEN: CPT

## 2020-12-10 PROCEDURE — 86901 BLOOD TYPING SEROLOGIC RH(D): CPT

## 2020-12-10 PROCEDURE — 84100 ASSAY OF PHOSPHORUS: CPT

## 2020-12-10 PROCEDURE — 81001 URINALYSIS AUTO W/SCOPE: CPT

## 2020-12-10 RX ORDER — MEMANTINE HYDROCHLORIDE 10 MG/1
1 TABLET ORAL
Qty: 0 | Refills: 0 | DISCHARGE

## 2020-12-10 RX ORDER — AMLODIPINE BESYLATE 2.5 MG/1
1 TABLET ORAL
Qty: 0 | Refills: 0 | DISCHARGE

## 2020-12-10 RX ORDER — CEFPODOXIME PROXETIL 100 MG
1 TABLET ORAL
Qty: 10 | Refills: 0
Start: 2020-12-10 | End: 2020-12-14

## 2020-12-10 RX ORDER — ATORVASTATIN CALCIUM 80 MG/1
1 TABLET, FILM COATED ORAL
Qty: 0 | Refills: 0 | DISCHARGE

## 2020-12-10 RX ORDER — OMEPRAZOLE 10 MG/1
1 CAPSULE, DELAYED RELEASE ORAL
Qty: 0 | Refills: 0 | DISCHARGE

## 2020-12-10 RX ADMIN — PANTOPRAZOLE SODIUM 40 MILLIGRAM(S): 20 TABLET, DELAYED RELEASE ORAL at 06:34

## 2020-12-10 RX ADMIN — Medication 650 MILLIGRAM(S): at 06:34

## 2020-12-10 RX ADMIN — HEPARIN SODIUM 5000 UNIT(S): 5000 INJECTION INTRAVENOUS; SUBCUTANEOUS at 06:34

## 2020-12-10 RX ADMIN — AMLODIPINE BESYLATE 2.5 MILLIGRAM(S): 2.5 TABLET ORAL at 06:35

## 2020-12-10 RX ADMIN — CEFTRIAXONE 100 MILLIGRAM(S): 500 INJECTION, POWDER, FOR SOLUTION INTRAMUSCULAR; INTRAVENOUS at 15:57

## 2020-12-10 RX ADMIN — HEPARIN SODIUM 5000 UNIT(S): 5000 INJECTION INTRAVENOUS; SUBCUTANEOUS at 13:23

## 2020-12-10 RX ADMIN — Medication 650 MILLIGRAM(S): at 13:23

## 2020-12-10 NOTE — DISCHARGE NOTE NURSING/CASE MANAGEMENT/SOCIAL WORK - PATIENT PORTAL LINK FT
You can access the FollowMyHealth Patient Portal offered by [facility] by registering at the following website: [email or web address]. By joining [facility]’s FollowMyHealth portal, you will also be able to view your health information using other applications (apps) compatible with our system.

## 2020-12-10 NOTE — CONSULT NOTE ADULT - ASSESSMENT
per Internal Medicine    85 yo M w/ PMH Alzheimer Dementia (AAOx1 at baseline), CKD, HTN, HLD, Prostate CA, BPH, is brought in to ED by home health aid due to episode of dizziness this morning, now resolved. Admitted for further management.    Problem/Plan - 1:  ·  Problem: Dizziness.  Plan: Patient presenting after an episode of dizziness lasting a few minutes. Appeared to be "going down" per home health aid but had no fall or LOC. Patient at baseline mentation (AAOx1 to self only and pleasantly confused). Physical exam benign. HHA reports patient with good appetite and adequate PO intake. Patient without any other symptoms and unable to volunteer further history. CTH on admission negative. CXR without any acute pathologies. VSS. No leukocytosis. Is anemic but Hb around baseline per chart review. UA is notably positive. Possibly 2/2 UTI vs orthostatic hypotension vs anemia.  - UA positive, f/u UCx  - orthostatics in AM  - monitor vitals  - s/p 1L NS in ED, appears euvolemic at this time, hold off on further IVF for now  - rest of management as below.     Problem/Plan - 2:  ·  Problem: UTI (urinary tract infection).  Plan: No urinary symptoms on admission but UA notable for moderate leukocyte esterase, moderate blood, many WBC, and bacteria present. VSS, afebrile, no leukocytosis. Possibly contributing to episode of dizziness and appearing slightly altered.  - f/u UCx  - start ceftriaxone 1g IV q24h for empiric coverage  - continue to monitor for any other symptoms.     Problem/Plan - 3:  ·  Problem: Microscopic hematuria.  Plan: Red blood cells on urine microscopy. No emperatriz hematuria.   Should get urinalysis rechecked outpatient after completing treatment for UTI.   May need referral for cystoscopy outpatient if microscopic hematuria still present after completing course of abx for UTI.     Problem/Plan - 4:  ·  Problem: Alkaline phosphatase elevation.  Plan: On admission, Alk Phos elev to 157. AST 10 and ALT 10. Patient without any abdominal pain, non-tender on exam. No N/V, or changes in BM noted by HHA. No history of liver or gallbladder-related diagnoses.   - trend alk phos.     Problem/Plan - 5:  ·  Problem: Anemia.  Plan: Normocytic, Hgb at presentation 8.2 (baseline ~9). Fe studies 8/2020 TIBC/ferritin WNL, HHA denies hematuria, hematochezia, melena or hematemesis. No obvious signs or symptoms of acute bleed at this time. Most likely 2/2 anemia of chronic disease due to CKD stage 5.    - trend CBC  - maintain active T&S  - transfuse if Hgb <7.     Problem/Plan - 6:  Problem: Chronic kidney disease, stage 5. Plan: BUN 73 and Cr 5.52 at presentation (baseline Cr ~4.5-5, although Cr appears to fluctuate based on chart review). Followed by nephrologist Dr. Ashraf. Possible SHARON on CKD 2/2 infection vs possible mild dehydration prior to admission. S/p 1L NS in ED.   - continue home sodium bicarb 650mg TID  - trend Cr  - encourage continued PO intake  - avoid nephrotoxic drugs, renally dose meds  - will consider obtaining urine lytes if not improving.    Problem/Plan - 7:  ·  Problem: Alzheimer disease.  Plan: Home med: donezepil 5mg qhs. HA notes patient at baseline mentation upon presentation : awake, alert and slightly confused in conversation. AAOx1 to self only.  - continue to monitor for any changes in mentation  - continue home memantine.     Problem/Plan - 8:  ·  Problem: Hypertension.  Plan: - c/w amlodipine 2.5mg PO qd    # Hyperlipidemia  - c/w atorvastatin 40mg PO qd.     Problem/Plan - 9:  ·  Problem: Urinary retention.  Plan: During prior hospitalization (8/18-8/22) for L hip fracture patient had hopper placed by urology, started patient on flomax at that time.  - c/w home flomax 0.4mg qHS.     Problem/Plan - 10:  Problem: Nutrition, metabolism, and development symptoms. Plan; Fluids: none  Electrolytes: replete PRN  Nutrition: DASH/TLC, renal restrictions  Anti-coag: Lovenox 40mg subQ QD  GI prophylaxis: protonix 20mg PO qd    FULL CODE  Disposition: RMF.

## 2020-12-10 NOTE — PROGRESS NOTE ADULT - SUBJECTIVE AND OBJECTIVE BOX
Patient was seen and examined by me at bedside. I agree with resident's note, subjective, objective physical exam, assessment and plan with following modifications/additions.    Greater than 35 minutes spent on total encounter; more than 50% of the visit was spent counseling and/or coordinating care by the attending physician.    Exam on discharge: AOx1 to person at baseline, no focal deficits (moving all ext without weakness, no numbness reported), lungs clear, CV RRR, abd soft, nt abd mildly distended but no pain, no le edema, wwp.  Labs/imaging (CT head and CXR) reviewed     86 year old man with Alzheimer’s dementia (AAOx1 at baseline), stage IV CKD, chronic anemia, HTN, HLD, hx of prostate cancer, BPH, brought in by HHA due to presyncope episode likely in setting of hypovolemia from UTI. No true syncope noted. Now symptoms resolved, has 24 hour care at home so likely dc home vs [facility]  -UTI- improving clinically on ceftriaxone, complete 7 day course with cefpodoxime, f/u with PCP  -Chronic anemia, no bleeding currently, neg iron studies in past likely chronic disease, mildly macrocytic but low c/f sig folate/b12 deficiency- will ensure close outpt PCP f/u to monitor and refer to GI for workup of slow bleeding accordingly  -Stage IV CKD, mild SHARON on arrival likely dry, improving now, will ensure close PCP/renal followup  -Hx of BPH and retention- continue home flomax, bladder scan here to ensure no sig retention needing hopper(mild retention upto 300-400 expected at baseline)  -Alzhiemers dimentia- continue memantinine  -HTN- continue low dose amlodipine   -Dispo- home likely today with resumption of 24 hour care     Matt Rodriguez MD [phone]

## 2020-12-10 NOTE — DISCHARGE NOTE NURSING/CASE MANAGEMENT/SOCIAL WORK - NSDCFUADDAPPT_GEN_ALL_CORE_FT
Please note that the office of your Family Medicine Provider, Dr. Wilbert Morrison will contact you to schedule an appointment within 1 week of discharge. The office of Dr. Morrison will contact you from [phone].    Appointment was facilitated by Ms. ZANA Montero, Referral Coordinator.

## 2020-12-10 NOTE — CONSULT NOTE ADULT - SUBJECTIVE AND OBJECTIVE BOX
Patient is a 86y old  Male who presents with a chief complaint of      HPI:  87yo M w/ PMH Alzheimer Dementia (AAOx1 at baseline), CKD, HTN, HLD, Prostate CA, BPH, is brought in to ED by home health aid due to feeling dizzy this morning in the bathroom while attempting to transfer from shaving to the shower. Patient is awake and alert but pleasantly confused during conversation and unable to volunteer history regarding what brought him to the hospital. History obtained from home health aid. This morning, patient reported to the home health aid that he felt dizzy and per home health aid at bedside, appeared to be "going down" so she eased him to the toilet. Patient did not have any LOC or fall during this time. Appeared to be shaking a little while reporting the dizziness. Home health aid called 911 and when paramedics arrived, patient was reportedly back to baseline, mentating well, and denying having any symptoms. Per home health aid, she had heard from patient's other home health aid that yesterday patient also mentioned feeling dizzy for a few minutes before returning back to normal. Patient has a good appetite and has been having adequate PO intake. No fevers, chills, CP, SOB, cough, abdominal pain, N/V/D. No changes in BMs or urination. No recent changes in medications. No recent illnesses or sick contacts. Patient has no complaints and does not appear to be in any pain. Patient has 24hr care at home and has 4 home health aids that rotate in his care.    ED Vitals: 97.4 degrees, HR 74, 123/66, RR18, SpO2 97% RA  ED Labs: WBC 7.60, Hb 8.2 (baseline appears ~8-9 on chart review), BUN 73, Cr 5.52 (baseline ~4.5, ranging 4.3-5.4 since 2020), Trp 0.04 -> 0.03, AST 10, ALT 10, Alk Phos 157  ED Imaging: CT Head negative for acute intracranial pathology, CXR without any acute focal opacities  ED EKG: NSR, no ischemic changes  ED Course: 1L NS (09 Dec 2020 15:28)      PAST MEDICAL & SURGICAL HISTORY:  Chronic kidney disease, unspecified CKD stage    Cancer of skin, squamous cell    Prostate CA    Alzheimer disease    Hyperlipidemia    HTN (hypertension)    History of hemiarthroplasty of left hip        MEDICATIONS  (STANDING):  amLODIPine   Tablet 2.5 milliGRAM(s) Oral daily  atorvastatin 20 milliGRAM(s) Oral at bedtime  cefTRIAXone   IVPB 1000 milliGRAM(s) IV Intermittent every 24 hours  dextrose 40% Gel 15 Gram(s) Oral once  dextrose 5%. 1000 milliLiter(s) (50 mL/Hr) IV Continuous <Continuous>  dextrose 50% Injectable 25 Gram(s) IV Push once  glucagon  Injectable 1 milliGRAM(s) IntraMuscular once  heparin   Injectable 5000 Unit(s) SubCutaneous every 8 hours  memantine 5 milliGRAM(s) Oral every 24 hours  pantoprazole    Tablet 40 milliGRAM(s) Oral before breakfast  sodium bicarbonate 650 milliGRAM(s) Oral every 8 hours  tamsulosin 0.4 milliGRAM(s) Oral at bedtime    MEDICATIONS  (PRN):      FAMILY HISTORY:  No pertinent family history in first degree relatives        CBC Full  -  ( 10 Dec 2020 06:22 )  WBC Count : 6.01 K/uL  RBC Count : 2.46 M/uL  Hemoglobin : 7.8 g/dL  Hematocrit : 24.6 %  Platelet Count - Automated : 169 K/uL  Mean Cell Volume : 100.0 fl  Mean Cell Hemoglobin : 31.7 pg  Mean Cell Hemoglobin Concentration : 31.7 gm/dL  Auto Neutrophil # : x  Auto Lymphocyte # : x  Auto Monocyte # : x  Auto Eosinophil # : x  Auto Basophil # : x  Auto Neutrophil % : x  Auto Lymphocyte % : x  Auto Monocyte % : x  Auto Eosinophil % : x  Auto Basophil % : x      12-10    141  |  109<H>  |  65<H>  ----------------------------<  98  3.9   |  17<L>  |  5.44<H>    Ca    9.2      10 Dec 2020 06:22  Phos  4.0     12-10  Mg     2.0     12-10    TPro  6.6  /  Alb  3.9  /  TBili  0.2  /  DBili  x   /  AST  10  /  ALT  10  /  AlkPhos  157<H>  12-09      Urinalysis Basic - ( 09 Dec 2020 16:06 )    Color: Yellow / Appearance: Clear / S.020 / pH: x  Gluc: x / Ketone: NEGATIVE  / Bili: Negative / Urobili: 0.2 E.U./dL   Blood: x / Protein: 100 mg/dL / Nitrite: NEGATIVE   Leuk Esterase: Moderate / RBC: < 5 /HPF / WBC Many /HPF   Sq Epi: x / Non Sq Epi: 0-5 /HPF / Bacteria: Present /HPF          Radiology:    < from: CT Head No Cont (20 @ 12:09) >  EXAM:  CT BRAIN                          PROCEDURE DATE:  2020          INTERPRETATION:  PROCEDURE: CT head without intravenous contrast    CLINICAL INDICATION: Lightheadedness    TECHNIQUE: Multiple axial images were obtained and viewed at 5mm intervals from the skull base to the vertex. The images were reviewed in brain and bone windows. Sagittal and coronal reformations are provided.    COMPARISON: 2020    FINDINGS:    There is unchanged parenchymal volume loss. There is notableasymmetric atrophy of the right temporal lobe, stable in retrospect. There is no hydrocephalus, mass effect or midline shift.    There is no acute intracranial hemorrhage or extra-axial fluid collection.    The gray white differentiation appears preserved without evidence of an acute transcortical infarction.    The bony windows demonstrates no fractures. The visualized paranasal sinuses and mastoid air cells are predominantly clear. The right native ocular lens is absent.    IMPRESSION:  No acute intracranial pathology. Stable exam compared to 2020.        < from: Xray Chest 1 View-PORTABLE IMMEDIATE (Xray Chest 1 View-PORTABLE IMMEDIATE .) (20 @ 11:27) >  EXAM:  XR CHEST PORTABLE IMMED 1V                          PROCEDURE DATE:  2020          INTERPRETATION:  Clinical history/reason for exam: Dizziness.    Frontal chest/low lung volumes.    COMPARISON: 2020.    Findings/  impression: Cardiac magnification. No acute focal opacity. Stable bony structures.                Vital Signs Last 24 Hrs  T(C): 36.5 (10 Dec 2020 06:53), Max: 36.7 (09 Dec 2020 21:13)  T(F): 97.7 (10 Dec 2020 06:53), Max: 98.1 (10 Dec 2020 01:00)  HR: 74 (10 Dec 2020 06:53) (70 - 90)  BP: 151/75 (10 Dec 2020 06:53) (123/66 - 171/85)  BP(mean): --  RR: 18 (10 Dec 2020 06:53) (18 - 18)  SpO2: 95% (10 Dec 2020 06:53) (95% - 99%)    REVIEW OF SYSTEMS: as per HPI      Physical Exam:  WDWN 87 yo  gentleman lying in bed, confused- not sure why is here, no acute complaints    Head: normocephalic, atraumatic    Eyes: PERRLA, EOMI, no nystagmus, sclera anicteric    ENT: nasal discharge, uvula midline, no oropharyngeal erythema/exudate    Neck: supple, negative JVD, negative carotid bruits, no thyromegaly    Chest: CTA bilaterally, neg wheeze/ rhonchi/ rales/ crackles/ egophany    Cardiovascular: regular rate and rhythm, neg murmurs/rubs/gallops    Abdomen: soft, non distended, non tender to palpation in all 4 quadrants, negative rebound/guarding, normal bowel sounds    Extremities: WWP, neg cyanosis/clubbing/edema, negative calf tenderness to palpation, negative Dakotah's sign    Musculoskeletal:      :     Neurologic Exam:    Alert and oriented x 1 to person, speech fluent w/o dysarthria  Cranial Nerves:     II:                       pupils equal, round and reactive to light, visual fields intact   III/ IV/VI:            extraocular movements intact, neg nystagmus, neg ptosis  V:                       facial sensation intact, V1-3 normal  VII:                     face symmetric, no droop, normal eye closure and smile  VIII:                    hearing intact to finger rub bilaterally  IX/ X:                 soft palate rise symmetrical  XI:                      head turning, shoulder shrug normal  XII:                     tongue midline    Motor Exam:    Right UE:  no focal weakness > 3+/5                     pronator drift: neg        Left UE:    no focal weakness > 3+/5                      pronator drift: neg        Right LE:   no focal weakness > 3+/5        Left LE:    no focal weakness > 3+/5               Sensation: Intact to light touch x 4 extremities                                         DTR:                        biceps/brachioradialis: equal bilaterally          R:       L:             patella/ankle: equal bilaterally          R:      L:             neg clonus          neg Babinski                          Gait:  not tested        PM&R Impression:    1) deconditioned  2) no focal weakness  3) UTI    Plan:    1) Physical therapy focusing on therapeutic exercises, bed mobility/transfer out of bed evaluation, progressive ambulation with assistive devices prn.    2) Anticipated Disposition Plan/Recs:    pending functional progress, per H&P- patient lives with his wife in an elevator accessible apartment building with 24 hr x 7 day home care services: probable d/c home with home physical therapy and resuming home care services

## 2020-12-10 NOTE — PROGRESS NOTE ADULT - SUBJECTIVE AND OBJECTIVE BOX
INTERVAL HPI/OVERNIGHT EVENTS:  O/N:  This morning: Patient was seen and examined at bedside. No complaints at this time. Patient denies: fever, chills, dizziness, weakness, HA, Changes in vision, CP, palpitations, SOB, cough, N/V/D/C, dysuria, changes in bowel movements, LE edema. ROS otherwise negative.    VITAL SIGNS:  T(F): 97.7 (12-10-20 @ 06:53)  HR: 74 (12-10-20 @ 06:53)  BP: 151/75 (12-10-20 @ 06:53)  RR: 18 (12-10-20 @ 06:53)  SpO2: 95% (12-10-20 @ :53)  Wt(kg): --    PHYSICAL EXAM:    Constitutional: WDWN, NAD  HEENT: PERRL, EOMI, sclera non-icteric, neck supple, trachea midline, no masses, no JVD, MMM, good dentition  Respiratory: CTA b/l, good air entry b/l, no wheezing, no rhonchi, no rales, without accessory muscle use and no intercostal retractions  Cardiovascular: RRR, normal S1S2, no M/R/G  Gastrointestinal: soft, NTND, no masses palpable, BS normal  Extremities: Warm, well perfused, pulses equal bilateral upper and lower extremities, no edema, no clubbing  Neurological: AAOx3, CN Grossly intact  Skin: Normal temperature, warm, dry    MEDICATIONS  (STANDING):  amLODIPine   Tablet 2.5 milliGRAM(s) Oral daily  atorvastatin 20 milliGRAM(s) Oral at bedtime  cefTRIAXone   IVPB 1000 milliGRAM(s) IV Intermittent every 24 hours  dextrose 40% Gel 15 Gram(s) Oral once  dextrose 5%. 1000 milliLiter(s) (50 mL/Hr) IV Continuous <Continuous>  dextrose 50% Injectable 25 Gram(s) IV Push once  glucagon  Injectable 1 milliGRAM(s) IntraMuscular once  heparin   Injectable 5000 Unit(s) SubCutaneous every 8 hours  memantine 5 milliGRAM(s) Oral every 24 hours  pantoprazole    Tablet 40 milliGRAM(s) Oral before breakfast  sodium bicarbonate 650 milliGRAM(s) Oral every 8 hours  tamsulosin 0.4 milliGRAM(s) Oral at bedtime    MEDICATIONS  (PRN):      Allergies    penicillins (Rash)    Intolerances        LABS:                        7.8    6.01  )-----------( 169      ( 10 Dec 2020 06:22 )             24.6     12-10    141  |  109<H>  |  65<H>  ----------------------------<  98  3.9   |  17<L>  |  5.44<H>    Ca    9.2      10 Dec 2020 06:22  Phos  4.0     12-10  Mg     2.0     12-10    TPro  6.6  /  Alb  3.9  /  TBili  0.2  /  DBili  x   /  AST  10  /  ALT  10  /  AlkPhos  157<H>  12-09      Urinalysis Basic - ( 09 Dec 2020 16:06 )    Color: Yellow / Appearance: Clear / S.020 / pH: x  Gluc: x / Ketone: NEGATIVE  / Bili: Negative / Urobili: 0.2 E.U./dL   Blood: x / Protein: 100 mg/dL / Nitrite: NEGATIVE   Leuk Esterase: Moderate / RBC: < 5 /HPF / WBC Many /HPF   Sq Epi: x / Non Sq Epi: 0-5 /HPF / Bacteria: Present /HPF        RADIOLOGY & ADDITIONAL TESTS:  Reviewed

## 2020-12-28 ENCOUNTER — APPOINTMENT (OUTPATIENT)
Dept: ORTHOPEDIC SURGERY | Facility: CLINIC | Age: 85
End: 2020-12-28
Payer: COMMERCIAL

## 2020-12-28 VITALS — HEIGHT: 63 IN | BODY MASS INDEX: 21.62 KG/M2 | WEIGHT: 122 LBS

## 2020-12-28 DIAGNOSIS — Z96.649 PERIPROSTHETIC FRACTURE AROUND OTHER INTERNAL PROSTHETIC JOINT, SUBSEQUENT ENCOUNTER: ICD-10-CM

## 2020-12-28 DIAGNOSIS — M97.8XXD PERIPROSTHETIC FRACTURE AROUND OTHER INTERNAL PROSTHETIC JOINT, SUBSEQUENT ENCOUNTER: ICD-10-CM

## 2020-12-28 PROCEDURE — 99072 ADDL SUPL MATRL&STAF TM PHE: CPT

## 2020-12-28 PROCEDURE — 73502 X-RAY EXAM HIP UNI 2-3 VIEWS: CPT | Mod: LT

## 2020-12-28 PROCEDURE — 99214 OFFICE O/P EST MOD 30 MIN: CPT

## 2020-12-28 NOTE — PHYSICAL EXAM
[de-identified] : Left hip \par In he was able to walk a short distance in the examination room without assistance although had someone at his side. Balance is somewhat compromised.Gait is shuffling.\par Incision over the LEFT posterior lateral hip is well healed without any erythema or ecchymoses. No edema in the LEFT lower extremity.\par Hip range of motion from zero to about 90-95° flexion without pain. It external rotation to about 25-30° without pain.\par The range of motion is from about 5-125° with some stiffness but no pain.\par No effusion in the knee joint. No specific tenderness from the hip down through the knee.\par Normal neurovascular exam distally. [de-identified] : \par AP pelvis x-ray and x-rays of the left hip show hemiarthroplasty prosthesis and cable fixation unchanged from prior x-rays. Fracture appears healed

## 2020-12-28 NOTE — HISTORY OF PRESENT ILLNESS
[de-identified] : 87 yo s/p ORIF prox femur periprosthetic fx and revision hemiarthroplasty 9/6/20 by Dr. Eduardo.\par \par He was last seen about 2 months ago for followup and seemed to be doing well at that time. He did get some home physical therapy for a while and now is just doing exercises on his own and with the home health aide who is with him today.\par He states that at times he gets pain in the thigh between the knee and hip but no constant pain and at the time of the visit he wasn't having any pain He doesn't typically take anything for pain.No fevers or chills.\par He goes in the doss of his building and will walk with his walker

## 2020-12-28 NOTE — ASSESSMENT
[FreeTextEntry1] : 87 yo 3 1/2 mos postop rev hip chaparro and cable fixation of Periprosthetic fracture.\par Fracture appears healed and he is functioning progressively better. He does get some intermittent pains but did not have any pain at the time of that visit and it seems that most of the time he is without any significant pain.\par He should continue with the home exercise program working on motion and progressive strengthening as he was taught by the physical therapist. His balance is not good and he should walk with assistance or with a walker.\par He will followup with Dr. Eduardo in the about 2 months or sooner if Dr. Eduardo would prefer to

## 2020-12-29 ENCOUNTER — APPOINTMENT (OUTPATIENT)
Dept: NEPHROLOGY | Facility: CLINIC | Age: 85
End: 2020-12-29
Payer: COMMERCIAL

## 2020-12-29 VITALS
HEIGHT: 63 IN | HEART RATE: 68 BPM | DIASTOLIC BLOOD PRESSURE: 65 MMHG | BODY MASS INDEX: 21.79 KG/M2 | WEIGHT: 123 LBS | SYSTOLIC BLOOD PRESSURE: 116 MMHG

## 2020-12-29 PROCEDURE — 99214 OFFICE O/P EST MOD 30 MIN: CPT

## 2020-12-29 PROCEDURE — 99072 ADDL SUPL MATRL&STAF TM PHE: CPT

## 2020-12-29 NOTE — HISTORY OF PRESENT ILLNESS
[FreeTextEntry1] : 86-year-old man with dementia, who has stage V CKD without uremic symptoms or signs.  He was discharged from [facility] 2-1/2 weeks ago after a presyncopal episode, probably due to hypovolemia in the setting of a UTI.  He responded to Rocephin and has been doing well at home since discharge.  His aide, Zelette, indicates that his appetite is good with no nausea vomiting or diarrhea.  His only complaint is of occasional knee pain when the weather is cold.  His BP has been normal at home with a systolic in the 120 range his creatinine telma from his usual baseline of 4.6 up to 5.4 while hospitalized.  His hemoglobin was only in the 8 range.  As best I can tell, he did not receive EPO.

## 2020-12-29 NOTE — ASSESSMENT
[FreeTextEntry1] : 86-year-old man with dementia, who has stage V CKD -but no uremic signs or symptoms.  Hopefully his creatinine is stable somewhere in the 5 range.  His appetite remains remarkably good.  He has no real complaints.  Dialysis options were discussed several months ago with his daughter Trish, but there is no immediate plan to initiate renal replacement therapy.  Our goal is to maintain the best quality of life possible.  We are arranging a home draw of blood to include CBC, BMP, iron/TIBC.  We may want to consider use of EPO.  He will return in about 6 weeks

## 2020-12-29 NOTE — PHYSICAL EXAM
[General Appearance - Alert] : alert [General Appearance - In No Acute Distress] : in no acute distress [Sclera] : the sclera and conjunctiva were normal [PERRL With Normal Accommodation] : pupils were equal in size, round, and reactive to light [Extraocular Movements] : extraocular movements were intact [Outer Ear] : the ears and nose were normal in appearance [Neck Appearance] : the appearance of the neck was normal [Neck Cervical Mass (___cm)] : no neck mass was observed [Jugular Venous Distention Increased] : there was no jugular-venous distention [Auscultation Breath Sounds / Voice Sounds] : lungs were clear to auscultation bilaterally [Heart Rate And Rhythm] : heart rate was normal and rhythm regular [Heart Sounds] : normal S1 and S2 [Heart Sounds Gallop] : no gallops [Murmurs] : no murmurs [Heart Sounds Pericardial Friction Rub] : no pericardial rub [Edema] : there was no peripheral edema [Cervical Lymph Nodes Enlarged Posterior Bilaterally] : posterior cervical [Cervical Lymph Nodes Enlarged Anterior Bilaterally] : anterior cervical [Supraclavicular Lymph Nodes Enlarged Bilaterally] : supraclavicular [No CVA Tenderness] : no ~M costovertebral angle tenderness [Nail Clubbing] : no clubbing  or cyanosis of the fingernails [Musculoskeletal - Swelling] : no joint swelling seen [Skin Color & Pigmentation] : normal skin color and pigmentation [Skin Turgor] : normal skin turgor [] : no rash [Deep Tendon Reflexes (DTR)] : deep tendon reflexes were 2+ and symmetric [No Focal Deficits] : no focal deficits [Oriented To Time, Place, And Person] : oriented to person, place, and time [Affect] : the affect was normal

## 2021-01-06 ENCOUNTER — RX RENEWAL (OUTPATIENT)
Age: 86
End: 2021-01-06

## 2021-02-11 NOTE — PATIENT PROFILE ADULT - NSPRONUTRITIONRISK_GEN_A_NUR
Critical Care
Intervent Radiology
Urology
Critical Care
Critical Care
Internal Medicine
Urology
Infectious Disease
Infectious Disease
Internal Medicine
Hospitalist
Infectious Disease
No indicators present

## 2021-03-03 ENCOUNTER — APPOINTMENT (OUTPATIENT)
Dept: ORTHOPEDIC SURGERY | Facility: CLINIC | Age: 86
End: 2021-03-03
Payer: COMMERCIAL

## 2021-03-03 VITALS — WEIGHT: 140 LBS | SYSTOLIC BLOOD PRESSURE: 166 MMHG | BODY MASS INDEX: 24.8 KG/M2 | DIASTOLIC BLOOD PRESSURE: 65 MMHG

## 2021-03-03 VITALS — OXYGEN SATURATION: 95 % | HEART RATE: 70 BPM

## 2021-03-03 DIAGNOSIS — Z96.642 AFTERCARE FOLLOWING JOINT REPLACEMENT SURGERY: ICD-10-CM

## 2021-03-03 DIAGNOSIS — Z47.1 AFTERCARE FOLLOWING JOINT REPLACEMENT SURGERY: ICD-10-CM

## 2021-03-03 PROCEDURE — 73552 X-RAY EXAM OF FEMUR 2/>: CPT | Mod: LT

## 2021-03-03 PROCEDURE — 99213 OFFICE O/P EST LOW 20 MIN: CPT

## 2021-03-03 PROCEDURE — 99072 ADDL SUPL MATRL&STAF TM PHE: CPT

## 2021-03-03 NOTE — DISCUSSION/SUMMARY
[de-identified] : 87y/o male 6mo s/p revision L hip hemiarthroplasty for periprosthetic femur fracture\par - Cont HEP\par - RTC Sept 2021 with new pelvis and left femur XRs\par - D/w taran Medeiros over the phone

## 2021-03-03 NOTE — HISTORY OF PRESENT ILLNESS
[de-identified] : 87y/o male here with aide presenting for 6mo followup s/p L hip revision chaparro with ORIF of [place] B2 PPFFx, DOS 9/6/20. History mostly taken from the aide and corroborated by phone with son Waldemar. He has been doing well. Ambulating more and more, they encourage the use of walker but he doesn't use it in the house. Rare complaints of left hip/thigh pain. Aide reports a fall in the kitchen a couple of weeks ago; he did not appear to have any major injury and they did not seek medical attention. They are doing HEP with him.

## 2021-03-03 NOTE — PHYSICAL EXAM
[de-identified] : General appearance: well nourished and hydrated, pleasant, alert and oriented x 1 (self), somewhat uncooperative with exam.  \par HEENT: normocephalic, EOM intact, wearing mask, external auditory canal clear.  \par Cardiovascular: no lower leg edema, no varicosities, dorsalis pedis pulses palpable and symmetric.  \par Lymphatics: no palpable lymphadenopathy, no lymphedema.  \par Neurologic: sensation is normal, no muscle weakness in upper or lower extremities, patella tendon reflexes present and symmetric.  \par Dermatologic: skin moist, warm, no rash.  \par Spine: cervical spine with normal lordosis and painless range of motion, thoracic spine with normal kyphosis and painless range of motion, lumbosacral spine with normal lordosis and painless range of motion.\par Gait: presenting in wheelchair. Able to demonstrate non-antalgic gait with 1-person assist, hunched forward posture.\par \par Left hip:\par - Skin intact, healed posterolateral incision noted\par - No swelling/ecchymosis\par - No specific tenderness on palpation\par - ROM: 100 flexion, 0 extension, 15 adduction, 30 abduction, 20 internal rotation, 35 external rotation\par - MARLON painless\par - FADIR painless\par - Eddie negative\par - Stinchfield unable to comply\par - Flexor power 5/5\par - Abductor power 5/5 [de-identified] : Left femur XRs were taken today, interpreted by me, and reviewed with the patient's aide.\par \par Revision left hip hemiarthroplasty is in unchanged position as compared to prior studies. No change in position of fixation cables. Fracture is healed. No evidence of loosening.

## 2021-03-10 ENCOUNTER — APPOINTMENT (OUTPATIENT)
Dept: ORTHOPEDIC SURGERY | Facility: CLINIC | Age: 86
End: 2021-03-10

## 2021-06-02 ENCOUNTER — APPOINTMENT (OUTPATIENT)
Dept: NEPHROLOGY | Facility: CLINIC | Age: 86
End: 2021-06-02
Payer: COMMERCIAL

## 2021-06-02 VITALS
SYSTOLIC BLOOD PRESSURE: 138 MMHG | HEART RATE: 72 BPM | DIASTOLIC BLOOD PRESSURE: 64 MMHG | WEIGHT: 140 LBS | BODY MASS INDEX: 24.8 KG/M2 | HEIGHT: 63 IN

## 2021-06-02 PROCEDURE — 99072 ADDL SUPL MATRL&STAF TM PHE: CPT

## 2021-06-02 PROCEDURE — 99214 OFFICE O/P EST MOD 30 MIN: CPT

## 2021-06-02 NOTE — ASSESSMENT
[FreeTextEntry1] : 87-year-old man with CKD 5 but no uremic symptoms or signs.  Currently, I do not see a reason to start Procrit or Retacrit.. I will hold off on sodium bicarbonate because he already has several more important medications to take and his K is well controlled.  I am ordering a home draw to include BMP, hemoglobin hematocrit, phosphorus, and PTH.  He will return in 3 months.  I do not envision starting dialysis and therefore have not arranged any vascular access.

## 2021-06-02 NOTE — PHYSICAL EXAM
[General Appearance - Alert] : alert [General Appearance - In No Acute Distress] : in no acute distress [Sclera] : the sclera and conjunctiva were normal [PERRL With Normal Accommodation] : pupils were equal in size, round, and reactive to light [Extraocular Movements] : extraocular movements were intact [Outer Ear] : the ears and nose were normal in appearance [Neck Appearance] : the appearance of the neck was normal [Neck Cervical Mass (___cm)] : no neck mass was observed [Jugular Venous Distention Increased] : there was no jugular-venous distention [Auscultation Breath Sounds / Voice Sounds] : lungs were clear to auscultation bilaterally [Heart Rate And Rhythm] : heart rate was normal and rhythm regular [Heart Sounds Gallop] : no gallops [Heart Sounds] : normal S1 and S2 [Murmurs] : no murmurs [Heart Sounds Pericardial Friction Rub] : no pericardial rub [Edema] : there was no peripheral edema [Bowel Sounds] : normal bowel sounds [Abdomen Soft] : soft [Abdomen Tenderness] : non-tender [Abdomen Mass (___ Cm)] : no abdominal mass palpated [Cervical Lymph Nodes Enlarged Posterior Bilaterally] : posterior cervical [Cervical Lymph Nodes Enlarged Anterior Bilaterally] : anterior cervical [Supraclavicular Lymph Nodes Enlarged Bilaterally] : supraclavicular [No CVA Tenderness] : no ~M costovertebral angle tenderness [Nail Clubbing] : no clubbing  or cyanosis of the fingernails [Musculoskeletal - Swelling] : no joint swelling seen [Skin Color & Pigmentation] : normal skin color and pigmentation [Skin Turgor] : normal skin turgor [] : no rash [Deep Tendon Reflexes (DTR)] : deep tendon reflexes were 2+ and symmetric [No Focal Deficits] : no focal deficits [Oriented To Time, Place, And Person] : oriented to person, place, and time [Affect] : the affect was normal

## 2021-06-02 NOTE — HISTORY OF PRESENT ILLNESS
[FreeTextEntry1] : 87-year-old man with severe dementia, who has stage V CKD without uremic symptoms or signs.  He was hospitalized at [facility] in December, and had a fall 3 weeks ago followed by a syncopal episode 2 weeks ago.  He was evaluated by EMTs both times and did not require ER visits or hospitalization.  April 16, his creatinine was 5.6 with a BUN of 62, GFR of 10, K of 4.6, CO2 of 20, hemoglobin 9.5.  His aide is Alla, who accompanies him on this visit -she states that his appetite is good, with no nausea or vomiting.  It is difficult to get any meaningful history from him directly.  He does not complain of coldness or fatigue.  His BP at home generally ranges from 1 30-1 50 systolic.

## 2021-08-12 NOTE — ED PROVIDER NOTE - CPE EDP EYES NORM
Bronchodilator Assessment     RR 19  Breath Sounds: diminished  SPO2: 100  FiO2: 21      · Bronchodilator assessment at level  2  ·   · []    Bronchodilator Assessment  BRONCHODILATOR ASSESSMENT SCORE  Score 0 1 2 3 4 5   Breath Sounds   []  Patient Baseline []  No Wheeze good aeration [x]  Faint, scattered wheezing, good aeration []  Expiratory Wheezing and or moderately diminished []  Insp/Exp wheeze and/or very diminished []  Insp/Exp and/ or marked distress   Respiratory Rate   []  Patient Baseline []  Less than 20 [x]  Less than 20 []  20-25 []  Greater than 25 []  Greater than 25   Peak flow % of Pred or PB [x]  NA   []  Greater than 90%  []  81-90% []  71-80% []  Less than or equal to 70%  or unable to perform []  Unable due to Respiratory Distress   Dyspnea re []  Patient Baseline []  No SOB []  No SOB []  SOB on exertion []  SOB min activity []  At rest/acute   e FEV% Predicted       [x]  NA []  Above 69%  []  Unable []  Above 60-69%  []  Unable []  Above 50-59%  []  Unable []  Above 35-49%  []  Unable []  Less than 35%  []  Unable          LARRY PARRISH RCP      4:14 PM normal...

## 2021-09-01 ENCOUNTER — APPOINTMENT (OUTPATIENT)
Dept: NEPHROLOGY | Facility: CLINIC | Age: 86
End: 2021-09-01
Payer: COMMERCIAL

## 2021-09-01 VITALS
HEIGHT: 63 IN | SYSTOLIC BLOOD PRESSURE: 132 MMHG | HEART RATE: 76 BPM | BODY MASS INDEX: 25.87 KG/M2 | WEIGHT: 146 LBS | DIASTOLIC BLOOD PRESSURE: 71 MMHG

## 2021-09-01 DIAGNOSIS — E78.00 PURE HYPERCHOLESTEROLEMIA, UNSPECIFIED: ICD-10-CM

## 2021-09-01 PROCEDURE — 99214 OFFICE O/P EST MOD 30 MIN: CPT

## 2021-09-01 NOTE — ASSESSMENT
[FreeTextEntry1] : 87-year-old man with stage V CKD, but no overt uremic symptoms or signs.  His BP is nicely controlled.  He has not had any falls recently.  His appetite is so good that he has actually gained weight recently.  I am arranging a home blood draw to include CBC, BMP, phosphorus, and PTH.  Because of his severe dementia, there is no immediate plan for dialysis or vascular access.  Our goal is patient comfort and we will extend conservative therapy for as long as possible.

## 2021-09-01 NOTE — HISTORY OF PRESENT ILLNESS
[FreeTextEntry1] : 87-year-old man with severe dementia, stage V CKD without overt uremic symptoms or signs.  He was hospitalized 9 months ago here, and had a fall back in May after of syncopal episode.  His creatinine runs 5-6 with a BUN in the 60s and a GFR of 10, but his K is controlled.  He is mildly anemic.  His appetite has been good and there is no nausea or vomiting.  His BP has been normal at home.

## 2021-09-01 NOTE — PHYSICAL EXAM
[General Appearance - Alert] : alert [General Appearance - In No Acute Distress] : in no acute distress [Sclera] : the sclera and conjunctiva were normal [PERRL With Normal Accommodation] : pupils were equal in size, round, and reactive to light [Extraocular Movements] : extraocular movements were intact [Outer Ear] : the ears and nose were normal in appearance [Neck Appearance] : the appearance of the neck was normal [Neck Cervical Mass (___cm)] : no neck mass was observed [Jugular Venous Distention Increased] : there was no jugular-venous distention [Auscultation Breath Sounds / Voice Sounds] : lungs were clear to auscultation bilaterally [Heart Rate And Rhythm] : heart rate was normal and rhythm regular [Heart Sounds] : normal S1 and S2 [Heart Sounds Gallop] : no gallops [Murmurs] : no murmurs [Heart Sounds Pericardial Friction Rub] : no pericardial rub [Edema] : there was no peripheral edema [Bowel Sounds] : normal bowel sounds [Abdomen Soft] : soft [Abdomen Tenderness] : non-tender [Abdomen Mass (___ Cm)] : no abdominal mass palpated [Cervical Lymph Nodes Enlarged Posterior Bilaterally] : posterior cervical [Cervical Lymph Nodes Enlarged Anterior Bilaterally] : anterior cervical [Supraclavicular Lymph Nodes Enlarged Bilaterally] : supraclavicular [No CVA Tenderness] : no ~M costovertebral angle tenderness [Nail Clubbing] : no clubbing  or cyanosis of the fingernails [Musculoskeletal - Swelling] : no joint swelling seen [Skin Color & Pigmentation] : normal skin color and pigmentation [Skin Turgor] : normal skin turgor [] : no rash [Deep Tendon Reflexes (DTR)] : deep tendon reflexes were 2+ and symmetric [No Focal Deficits] : no focal deficits [Oriented To Time, Place, And Person] : oriented to person, place, and time [Affect] : the affect was normal

## 2021-09-09 NOTE — CONSULT NOTE ADULT - PROVIDER SPECIALTY LIST ADULT
SUBJECTIVE:  Patient ID: Jhony is a 12 year old male who is accompanied by with Mother.    Well Child Assessment:  History was provided by the mother. Jhony lives with his mother, father, brother and sister. Interval problems do not include chronic stress at home, recent illness or recent injury.   Nutrition  Types of intake include cereals, cow's milk, eggs, fish, fruits, vegetables, juices, meats and junk food (milk 2 cups 3 meals daily). Junk food includes fast food and chips.   Dental  The patient has a dental home. The patient brushes teeth regularly. Last dental exam was less than 6 months ago.   Elimination  Elimination problems do not include constipation, diarrhea or urinary symptoms. There is no bed wetting.   Behavioral  Behavioral issues do not include misbehaving with peers or performing poorly at school. Disciplinary methods include consistency among caregivers and taking away privileges.   Sleep  Average sleep duration (hrs): sleeping from 2100--0700 in own bed  The patient does not snore. There are no sleep problems.   Safety  There is no smoking in the home (no smokers in the home). Home has working smoke alarms? yes. Home has working carbon monoxide alarms? yes.   School  Current grade level is 7th. There are no signs of learning disabilities. Child is doing well in school.   Social  After school, the child is at home with a parent. Sibling interactions are good. The child spends 3 hours in front of a screen (tv or computer) per day.       History reviewed. No pertinent past medical history.  Social History     Socioeconomic History   • Marital status: Single     Spouse name: Not on file   • Number of children: Not on file   • Years of education: Not on file   • Highest education level: Not on file   Occupational History   • Not on file   Tobacco Use   • Smoking status: Never Smoker   • Smokeless tobacco: Never Used   Substance and Sexual Activity   • Alcohol use: Not on file   • Drug use: Not on  file   • Sexual activity: Not on file   Other Topics Concern   • Not on file   Social History Narrative   • Not on file     Social Determinants of Health     Financial Resource Strain:    • Social Determinants: Financial Resource Strain:    Food Insecurity:    • Social Determinants: Food Insecurity:    Transportation Needs:    • Lack of Transportation (Medical):    • Lack of Transportation (Non-Medical):    Physical Activity:    • Days of Exercise per Week:    • Minutes of Exercise per Session:    Stress:    • Social Determinants: Stress:    Social Connections:    • Social Determinants: Social Connections:    Intimate Partner Violence:    • Social Determinants: Intimate Partner Violence Past Fear:    • Social Determinants: Intimate Partner Violence Current Fear:      No current outpatient medications on file.     No current facility-administered medications for this visit.     History reviewed. No pertinent surgical history.  ALLERGIES:  No Known Allergies    Review of Systems   Constitutional: Negative.  Negative for appetite change and fever.   HENT: Negative.  Negative for congestion, ear pain, mouth sores, rhinorrhea, sneezing, sore throat and trouble swallowing.    Eyes: Negative.  Negative for discharge, redness and itching.   Respiratory: Negative.  Negative for snoring, cough, shortness of breath and wheezing.    Cardiovascular: Negative.  Negative for chest pain.   Gastrointestinal: Negative for abdominal pain, constipation, diarrhea and vomiting.   Endocrine: Negative.    Genitourinary: Negative.  Negative for decreased urine volume and dysuria.   Musculoskeletal: Negative.  Negative for gait problem, neck pain and neck stiffness.   Skin: Negative.  Negative for rash.   Allergic/Immunologic: Negative.  Negative for food allergies.   Neurological: Negative.  Negative for headaches.   Hematological: Does not bruise/bleed easily.   Psychiatric/Behavioral: Negative.  Negative for sleep disturbance.        OBJECTIVE  Vitals: BP 99/62   Pulse 96   Temp 98.7 °F (37.1 °C) (Temporal)   Ht 4' 9.09\" (1.45 m)   Wt 33.3 kg (73 lb 4.9 oz)   BMI 15.81 kg/m²   BSA 1.18 m²   Growth parameters are noted and are appropriate for age.    Physical Exam  Vitals reviewed. Exam conducted with a chaperone present.   Constitutional:       General: He is not in acute distress.     Appearance: Normal appearance. He is not ill-appearing or toxic-appearing.   HENT:      Head: Atraumatic.      Right Ear: Tympanic membrane, ear canal and external ear normal.      Left Ear: Tympanic membrane, ear canal and external ear normal.      Nose: Nose normal. No congestion or rhinorrhea.      Right Turbinates: Not enlarged.      Left Turbinates: Not enlarged.      Mouth/Throat:      Lips: Pink. No lesions.      Mouth: Mucous membranes are moist. No injury.      Dentition: No gum lesions.      Tongue: No lesions.      Palate: No lesions.      Pharynx: Oropharynx is clear. Uvula midline. No pharyngeal swelling, oropharyngeal exudate, posterior oropharyngeal erythema or uvula swelling.   Eyes:      General: Lids are normal. No scleral icterus.        Right eye: No discharge.         Left eye: No discharge.      Conjunctiva/sclera: Conjunctivae normal.      Pupils: Pupils are equal, round, and reactive to light.   Neck:      Thyroid: No thyromegaly.      Trachea: Trachea normal.   Cardiovascular:      Rate and Rhythm: Normal rate and regular rhythm.      Pulses: Normal pulses.           Femoral pulses are 2+ on the right side and 2+ on the left side.     Heart sounds: Normal heart sounds, S1 normal and S2 normal. No murmur heard.     Pulmonary:      Effort: Pulmonary effort is normal. No accessory muscle usage or respiratory distress.      Breath sounds: Normal breath sounds. No stridor or decreased air movement. No decreased breath sounds, wheezing, rhonchi or rales.   Chest:      Chest wall: No tenderness.   Abdominal:      General: Bowel sounds  are normal. There is no distension.      Palpations: Abdomen is soft. There is no mass.      Tenderness: There is no abdominal tenderness. There is no guarding or rebound.      Hernia: No hernia is present. There is no hernia in the left inguinal area or right inguinal area.   Genitourinary:     Pubic Area: No rash.       Penis: Normal.       Testes: Normal.         Right: Right testis is descended.         Left: Left testis is descended.   Musculoskeletal:         General: No tenderness or deformity. Normal range of motion.      Cervical back: Full passive range of motion without pain, normal range of motion and neck supple.      Right lower leg: No edema.      Left lower leg: No edema.      Comments: No scoliosis    Feet:      Right foot:      Skin integrity: Skin integrity normal.      Left foot:      Skin integrity: Skin integrity normal.   Lymphadenopathy:      Cervical: No cervical adenopathy.      Lower Body: No right inguinal adenopathy. No left inguinal adenopathy.   Skin:     General: Skin is warm and dry.      Capillary Refill: Capillary refill takes less than 2 seconds.      Findings: No rash.   Neurological:      General: No focal deficit present.      Mental Status: He is alert and oriented to person, place, and time.      Cranial Nerves: No facial asymmetry.      Motor: Motor function is intact. No weakness or abnormal muscle tone.      Coordination: Coordination normal.      Gait: Gait is intact. Gait normal.   Psychiatric:         Attention and Perception: Attention normal.         Mood and Affect: Mood and affect normal.         Speech: Speech normal.         Behavior: Behavior is cooperative.         Thought Content: Thought content normal.         Cognition and Memory: Memory normal.         Judgment: Judgment normal.         Sexually active ...No- never   Drug use ...No  Alcohol use ...No  Cigarette use ...No  E-cigarette use ...No   Sports wants to do track   Meds none    No results found for  this visit on 21.    Braxton: 2    Diabetic Screenin %ile (Z= -1.07) based on CDC (Boys, 2-20 Years) BMI-for-age based on BMI available as of 2021.   [x] Yes   []  No Family history of diabetes   [x] Yes   []  No Ethnic minority   [] Yes   [x]  No  Signs of insulin resistance    [] Yes   [x]  No At risk    Recent PHQ 2/9 Score    PHQ 2:  Date Peds PHQ 2 Score Peds PHQ 2 Interpretation   2021 1 No further screening needed       PHQ 9:  Date Peds PHQ 9 Score Peds PHQ 9 Interpretation   2021 3 Minimal Depression     Crafft negative     Labs:   No results found for this visit on 21.     ASSESSMENT/PLAN  Problem List Items Addressed This Visit     None      Visit Diagnoses     Encounter for routine child health examination without abnormal findings    -  Primary    Vaccine counseling        Exercise counseling        Dietary counseling        Screening for diabetes mellitus        Screening for mental disorder and developmental handicaps        Body mass index, pediatric, 5th percentile to less than 85th percentile for age        Encounter for examination for participation in sport        Need for vaccination        Relevant Orders    HPV 9 VALENT VACC    Need for COVID-19 vaccine              BMI Assessment/Plan:  Patient BMI is within normal range.    Sports physical:  Cleared for contact sports: yes     Counseling  Weight management:  The patient was counseled regarding nutrition and physical activity   denies knowledge of risky exposure  Mental inocencia done and discussed   Advised - second hand smoke     Immunizations: per orders.  History of previous adverse reactions to immunizations? no    DEPRESSION ASSESSMENT/PLAN:  Depression screening is negative no further plan needed.              COUNSELING/EDUCATION   •  Instructions for patient Patient/Parent verbalizes understanding  •  Education and counseling Parent/Patient states understanding of information presented  •  Discussed sports  safety  •  Discussed nutritional needs teach healthy choices including fruits and vegetables  •  Patient education about a proper diet  •  Parent education Bright Futures information packet given and discussed  •  Discussed concerns about exercise At least 1 hr physical activity daily  •  Discussed concerns about discipline reinforce limits, family rules, homework and chores  •  Discussed concerns about setting disciplinary limits and establish consequences  •  Discussed concerns about television Limit time to less than 2 hrs daily  •  Discussed concerns about sexual activity  •  Discussed concerns about tobacco use  to avoid  •  Discussed concerns about alcohol use  to avoid  •  Discussed concerns about illicit drug use  to avoid   --BMI advised and discussed   --discussed vaccines.     Follow-up yearly for a well visit, or sooner as needed.   Orthopedics Internal Medicine

## 2021-09-22 ENCOUNTER — APPOINTMENT (OUTPATIENT)
Dept: NEPHROLOGY | Facility: CLINIC | Age: 86
End: 2021-09-22
Payer: COMMERCIAL

## 2021-09-22 VITALS — WEIGHT: 145 LBS | BODY MASS INDEX: 25.69 KG/M2 | HEIGHT: 63 IN

## 2021-09-22 PROCEDURE — 99442: CPT

## 2021-09-22 NOTE — HISTORY OF PRESENT ILLNESS
[Home] : at home, [unfilled] , at the time of the visit. [Medical Office: ([facility])___] : at the medical office located in  [Verbal consent obtained from patient] : the patient, [unfilled] [FreeTextEntry1] : Discussed with patient : You have chosen to receive care through the use of tele-media.  It enables healthcare providers at different locations to provide safe, effective, and convenient care through the use of technology.  Please note this is a billable encounter.  As with any healthcare service, there are risks associated with the use of tele-media, including  issues.  You understand that I cannot physically examine you and that you may need to come to the office to complete the assessment.   Patient agreed verbally and understands the risks and benefits of tele-media as explained.  All questions regarding tele-media encounters were answered.\par      87-year-old man with severe dementia, stage V CKD without overt uremic symptoms or signs.  His creatinine typically runs up to about 6, but is now up to 6.95 with a BUN of 62, and a GFR of 6.  His K is acceptable at 5.0.  His CO2 is only 18, hemoglobin is 8.2, he has significant secondary hyperparathyroidism with a PTH of 485 and a calcium of 8.9.  His BP has generally been normal.  Conservative therapy and quality of life are the mainstay.

## 2021-09-22 NOTE — ASSESSMENT
[FreeTextEntry1] : 87-year-old man with severe dementia and far advanced CKD, with anemia and secondary hyperparathyroidism and metabolic acidosis.  I will add sodium bicarbonate 650 mg twice daily.  I am holding off on adding calcitriol because I think it will just be an additional pill that has long-term benefits he will likely never live to see.  I have also held off on DEREJE.  Time spent 11 minutes

## 2021-10-25 NOTE — PHYSICAL THERAPY INITIAL EVALUATION ADULT - REHAB POTENTIAL, PT EVAL
CONSTITUTIONAL: No weakness, fevers or chills  EYES/ENT: No visual changes;  No vertigo or throat pain   NECK: No pain or stiffness  RESPIRATORY: No cough, wheezing, hemoptysis; No shortness of breath  CARDIOVASCULAR: No chest pain or palpitations  GASTROINTESTINAL: No abdominal or epigastric pain. No nausea, vomiting, or hematemesis; No diarrhea or constipation. No melena or hematochezia.  GENITOURINARY: No dysuria, frequency or hematuria  NEUROLOGICAL: + leg weakness and pain   SKIN: No itching, rashes good, to achieve stated therapy goals

## 2021-11-23 ENCOUNTER — RX RENEWAL (OUTPATIENT)
Age: 86
End: 2021-11-23

## 2021-12-06 ENCOUNTER — APPOINTMENT (OUTPATIENT)
Dept: NEPHROLOGY | Facility: CLINIC | Age: 86
End: 2021-12-06
Payer: COMMERCIAL

## 2021-12-06 VITALS
DIASTOLIC BLOOD PRESSURE: 75 MMHG | WEIGHT: 147 LBS | HEIGHT: 63 IN | BODY MASS INDEX: 26.05 KG/M2 | HEART RATE: 72 BPM | SYSTOLIC BLOOD PRESSURE: 134 MMHG

## 2021-12-06 DIAGNOSIS — Z80.7 FAMILY HISTORY OF OTHER MALIGNANT NEOPLASMS OF LYMPHOID, HEMATOPOIETIC AND RELATED TISSUES: ICD-10-CM

## 2021-12-06 DIAGNOSIS — R60.0 LOCALIZED EDEMA: ICD-10-CM

## 2021-12-06 DIAGNOSIS — Z80.0 FAMILY HISTORY OF MALIGNANT NEOPLASM OF DIGESTIVE ORGANS: ICD-10-CM

## 2021-12-06 PROCEDURE — 99214 OFFICE O/P EST MOD 30 MIN: CPT

## 2021-12-06 NOTE — CONSULT LETTER
[Dear  ___] : Dear  [unfilled], [Please see my note below.] : Please see my note below. [Consult Closing:] : Thank you very much for allowing me to participate in the care of this patient.  If you have any questions, please do not hesitate to contact me. [Sincerely,] : Sincerely, [FreeTextEntry2] : Chris Joe [FreeTextEntry3] : Sincerely, \par \par Alli Ashraf MD, FACP

## 2021-12-06 NOTE — ASSESSMENT
[FreeTextEntry1] : 87-year-old man with severe dementia and stage V CKD, but maintaining remarkable stability -he has no uremic signs or symptoms.  There is no pericardial friction rub or asterixis.  His appetite is excellent according to his aide.  His K is controlled.  I am going to add sodium bicarbonate because his CO2 level is 16.  I have ordered labs for home draw in 3 months.  He will return shortly after that either telephonic or in person.  I do not see him as a viable dialysis candidate, but he is doing well so far with conservative management.

## 2021-12-06 NOTE — HISTORY OF PRESENT ILLNESS
[FreeTextEntry1] : 87-year-old man with severe dementia, stage V CKD without overt uremic symptoms or signs in the past.  His creatinine has been stable in the range of 6 and is currently 6.1 with a stable BUN of 64 and GFR of 8.    His K is acceptable at 5.0.  His CO2 is down to 16 and can benefit from sodium bicarbonate replacement.  His hemoglobin is low, typically 8-9.  His PTH has been high.  BP has generally been normal.  Conservative therapy and preserving quality of life has been the thrust of treatment.  He is on amlodipine 5 mg daily and furosemide 20 mg daily, which keeps his BP in good range.  He was brought today by his caretaker who has done an excellent job -she indicates that he is eating very well with no nausea or vomiting.  He was able to come here using a walker and her guidance.

## 2022-01-01 ENCOUNTER — LABORATORY RESULT (OUTPATIENT)
Age: 87
End: 2022-01-01

## 2022-01-01 ENCOUNTER — RX RENEWAL (OUTPATIENT)
Age: 87
End: 2022-01-01

## 2022-01-01 ENCOUNTER — INPATIENT (INPATIENT)
Facility: HOSPITAL | Age: 87
LOS: 0 days | DRG: 871 | End: 2023-01-01
Attending: INTERNAL MEDICINE | Admitting: INTERNAL MEDICINE
Payer: MEDICARE

## 2022-01-01 ENCOUNTER — APPOINTMENT (OUTPATIENT)
Dept: NEPHROLOGY | Facility: CLINIC | Age: 87
End: 2022-01-01

## 2022-01-01 ENCOUNTER — APPOINTMENT (OUTPATIENT)
Dept: NEPHROLOGY | Facility: CLINIC | Age: 87
End: 2022-01-01
Payer: COMMERCIAL

## 2022-01-01 VITALS
WEIGHT: 147 LBS | BODY MASS INDEX: 26.05 KG/M2 | SYSTOLIC BLOOD PRESSURE: 122 MMHG | SYSTOLIC BLOOD PRESSURE: 128 MMHG | DIASTOLIC BLOOD PRESSURE: 64 MMHG | HEIGHT: 63 IN | WEIGHT: 147 LBS | HEART RATE: 84 BPM | BODY MASS INDEX: 26.05 KG/M2 | HEART RATE: 76 BPM | DIASTOLIC BLOOD PRESSURE: 64 MMHG | HEIGHT: 63 IN

## 2022-01-01 VITALS
BODY MASS INDEX: 26.05 KG/M2 | SYSTOLIC BLOOD PRESSURE: 124 MMHG | DIASTOLIC BLOOD PRESSURE: 62 MMHG | HEART RATE: 80 BPM | HEIGHT: 63 IN | WEIGHT: 147 LBS

## 2022-01-01 VITALS
BODY MASS INDEX: 26.05 KG/M2 | HEART RATE: 80 BPM | HEIGHT: 63 IN | WEIGHT: 147 LBS | DIASTOLIC BLOOD PRESSURE: 66 MMHG | SYSTOLIC BLOOD PRESSURE: 122 MMHG

## 2022-01-01 VITALS
OXYGEN SATURATION: 94 % | SYSTOLIC BLOOD PRESSURE: 166 MMHG | TEMPERATURE: 101 F | RESPIRATION RATE: 26 BRPM | DIASTOLIC BLOOD PRESSURE: 100 MMHG | HEART RATE: 122 BPM

## 2022-01-01 VITALS — HEIGHT: 63 IN | BODY MASS INDEX: 25.87 KG/M2 | WEIGHT: 146 LBS

## 2022-01-01 DIAGNOSIS — R00.0 TACHYCARDIA, UNSPECIFIED: ICD-10-CM

## 2022-01-01 DIAGNOSIS — C44.90 UNSPECIFIED MALIGNANT NEOPLASM OF SKIN, UNSPECIFIED: ICD-10-CM

## 2022-01-01 DIAGNOSIS — N40.0 BENIGN PROSTATIC HYPERPLASIA WITHOUT LOWER URINARY TRACT SYMPTOMS: ICD-10-CM

## 2022-01-01 DIAGNOSIS — C61 MALIGNANT NEOPLASM OF PROSTATE: ICD-10-CM

## 2022-01-01 DIAGNOSIS — E87.5 HYPERKALEMIA: ICD-10-CM

## 2022-01-01 DIAGNOSIS — N25.81 SECONDARY HYPERPARATHYROIDISM OF RENAL ORIGIN: ICD-10-CM

## 2022-01-01 DIAGNOSIS — N18.5 CHRONIC KIDNEY DISEASE, STAGE 5: ICD-10-CM

## 2022-01-01 DIAGNOSIS — Z85.46 PERSONAL HISTORY OF MALIGNANT NEOPLASM OF PROSTATE: ICD-10-CM

## 2022-01-01 DIAGNOSIS — C44.92 SQUAMOUS CELL CARCINOMA OF SKIN, UNSPECIFIED: ICD-10-CM

## 2022-01-01 DIAGNOSIS — J18.9 PNEUMONIA, UNSPECIFIED ORGANISM: ICD-10-CM

## 2022-01-01 DIAGNOSIS — N17.9 ACUTE KIDNEY FAILURE, UNSPECIFIED: ICD-10-CM

## 2022-01-01 DIAGNOSIS — E87.2 ACIDOSIS: ICD-10-CM

## 2022-01-01 DIAGNOSIS — G30.9 ALZHEIMER'S DISEASE, UNSPECIFIED: ICD-10-CM

## 2022-01-01 DIAGNOSIS — I10 ESSENTIAL (PRIMARY) HYPERTENSION: ICD-10-CM

## 2022-01-01 DIAGNOSIS — R39.9 UNSPECIFIED SYMPTOMS AND SIGNS INVOLVING THE GENITOURINARY SYSTEM: ICD-10-CM

## 2022-01-01 DIAGNOSIS — D63.1 CHRONIC KIDNEY DISEASE, UNSPECIFIED: ICD-10-CM

## 2022-01-01 DIAGNOSIS — Z82.0 FAMILY HISTORY OF EPILEPSY AND OTHER DISEASES OF THE NERVOUS SYSTEM: ICD-10-CM

## 2022-01-01 DIAGNOSIS — Z96.642 PRESENCE OF LEFT ARTIFICIAL HIP JOINT: Chronic | ICD-10-CM

## 2022-01-01 DIAGNOSIS — F02.80 ALZHEIMER'S DISEASE, UNSPECIFIED: ICD-10-CM

## 2022-01-01 DIAGNOSIS — D64.9 ANEMIA, UNSPECIFIED: ICD-10-CM

## 2022-01-01 DIAGNOSIS — Z29.9 ENCOUNTER FOR PROPHYLACTIC MEASURES, UNSPECIFIED: ICD-10-CM

## 2022-01-01 DIAGNOSIS — N18.9 CHRONIC KIDNEY DISEASE, UNSPECIFIED: ICD-10-CM

## 2022-01-01 DIAGNOSIS — Z85.828 PERSONAL HISTORY OF OTHER MALIGNANT NEOPLASM OF SKIN: ICD-10-CM

## 2022-01-01 DIAGNOSIS — A41.9 SEPSIS, UNSPECIFIED ORGANISM: ICD-10-CM

## 2022-01-01 LAB
ACANTHOCYTES BLD QL SMEAR: SIGNIFICANT CHANGE UP
ALBUMIN SERPL ELPH-MCNC: 2.7 G/DL — LOW (ref 3.3–5)
ALBUMIN SERPL ELPH-MCNC: 3.1 G/DL — LOW (ref 3.3–5)
ALBUMIN SERPL ELPH-MCNC: 3.5 G/DL — SIGNIFICANT CHANGE UP (ref 3.3–5)
ALP SERPL-CCNC: 100 U/L — SIGNIFICANT CHANGE UP (ref 40–120)
ALP SERPL-CCNC: 85 U/L — SIGNIFICANT CHANGE UP (ref 40–120)
ALP SERPL-CCNC: 85 U/L — SIGNIFICANT CHANGE UP (ref 40–120)
ALT FLD-CCNC: 13 U/L — SIGNIFICANT CHANGE UP (ref 10–45)
ALT FLD-CCNC: 15 U/L — SIGNIFICANT CHANGE UP (ref 10–45)
ALT FLD-CCNC: 16 U/L — SIGNIFICANT CHANGE UP (ref 10–45)
ANION GAP SERPL CALC-SCNC: 25 MMOL/L — HIGH (ref 5–17)
ANION GAP SERPL CALC-SCNC: 27 MMOL/L — HIGH (ref 5–17)
ANION GAP SERPL CALC-SCNC: 29 MMOL/L — HIGH (ref 5–17)
ANISOCYTOSIS BLD QL: SIGNIFICANT CHANGE UP
ANISOCYTOSIS BLD QL: SLIGHT — SIGNIFICANT CHANGE UP
APPEARANCE UR: CLEAR — SIGNIFICANT CHANGE UP
APTT BLD: 30.6 SEC — SIGNIFICANT CHANGE UP (ref 27.5–35.5)
AST SERPL-CCNC: 20 U/L — SIGNIFICANT CHANGE UP (ref 10–40)
AST SERPL-CCNC: 21 U/L — SIGNIFICANT CHANGE UP (ref 10–40)
AST SERPL-CCNC: 21 U/L — SIGNIFICANT CHANGE UP (ref 10–40)
BACTERIA # UR AUTO: ABNORMAL /HPF
BASE EXCESS BLDV CALC-SCNC: -13.5 MMOL/L — LOW (ref -2–3)
BASOPHILS # BLD AUTO: 0 K/UL — SIGNIFICANT CHANGE UP (ref 0–0.2)
BASOPHILS # BLD AUTO: 0 K/UL — SIGNIFICANT CHANGE UP (ref 0–0.2)
BASOPHILS NFR BLD AUTO: 0 % — SIGNIFICANT CHANGE UP (ref 0–2)
BASOPHILS NFR BLD AUTO: 0 % — SIGNIFICANT CHANGE UP (ref 0–2)
BILIRUB SERPL-MCNC: 0.3 MG/DL — SIGNIFICANT CHANGE UP (ref 0.2–1.2)
BILIRUB SERPL-MCNC: 0.4 MG/DL — SIGNIFICANT CHANGE UP (ref 0.2–1.2)
BILIRUB SERPL-MCNC: 0.4 MG/DL — SIGNIFICANT CHANGE UP (ref 0.2–1.2)
BILIRUB UR-MCNC: NEGATIVE — SIGNIFICANT CHANGE UP
BUN SERPL-MCNC: 141 MG/DL — HIGH (ref 7–23)
BUN SERPL-MCNC: 154 MG/DL — HIGH (ref 7–23)
BUN SERPL-MCNC: 156 MG/DL — HIGH (ref 7–23)
BURR CELLS BLD QL SMEAR: PRESENT — SIGNIFICANT CHANGE UP
BURR CELLS BLD QL SMEAR: PRESENT — SIGNIFICANT CHANGE UP
BURR CELLS BLD QL SMEAR: SIGNIFICANT CHANGE UP
CA-I SERPL-SCNC: 1.15 MMOL/L — SIGNIFICANT CHANGE UP (ref 1.15–1.33)
CALCIUM SERPL-MCNC: 8.5 MG/DL — SIGNIFICANT CHANGE UP (ref 8.4–10.5)
CALCIUM SERPL-MCNC: 8.9 MG/DL — SIGNIFICANT CHANGE UP (ref 8.4–10.5)
CALCIUM SERPL-MCNC: 9.2 MG/DL — SIGNIFICANT CHANGE UP (ref 8.4–10.5)
CHLORIDE SERPL-SCNC: 103 MMOL/L — SIGNIFICANT CHANGE UP (ref 96–108)
CHLORIDE SERPL-SCNC: 104 MMOL/L — SIGNIFICANT CHANGE UP (ref 96–108)
CHLORIDE SERPL-SCNC: 105 MMOL/L — SIGNIFICANT CHANGE UP (ref 96–108)
CO2 BLDV-SCNC: 11.8 MMOL/L — LOW (ref 22–26)
CO2 SERPL-SCNC: 11 MMOL/L — LOW (ref 22–31)
CO2 SERPL-SCNC: 11 MMOL/L — LOW (ref 22–31)
CO2 SERPL-SCNC: 12 MMOL/L — LOW (ref 22–31)
COLOR SPEC: YELLOW — SIGNIFICANT CHANGE UP
CREAT SERPL-MCNC: 11.17 MG/DL — HIGH (ref 0.5–1.3)
CREAT SERPL-MCNC: 12.55 MG/DL — HIGH (ref 0.5–1.3)
CREAT SERPL-MCNC: 13.06 MG/DL — HIGH (ref 0.5–1.3)
DIFF PNL FLD: ABNORMAL
EGFR: 3 ML/MIN/1.73M2 — LOW
EGFR: 3 ML/MIN/1.73M2 — LOW
EGFR: 4 ML/MIN/1.73M2 — LOW
EOSINOPHIL # BLD AUTO: 0 K/UL — SIGNIFICANT CHANGE UP (ref 0–0.5)
EOSINOPHIL # BLD AUTO: 0 K/UL — SIGNIFICANT CHANGE UP (ref 0–0.5)
EOSINOPHIL NFR BLD AUTO: 0 % — SIGNIFICANT CHANGE UP (ref 0–6)
EOSINOPHIL NFR BLD AUTO: 0 % — SIGNIFICANT CHANGE UP (ref 0–6)
EPI CELLS # UR: SIGNIFICANT CHANGE UP /HPF (ref 0–5)
FLUAV AG NPH QL: DETECTED
FLUBV AG NPH QL: SIGNIFICANT CHANGE UP
GAS PNL BLDV: 140 MMOL/L — SIGNIFICANT CHANGE UP (ref 136–145)
GAS PNL BLDV: SIGNIFICANT CHANGE UP
GAS PNL BLDV: SIGNIFICANT CHANGE UP
GLUCOSE SERPL-MCNC: 121 MG/DL — HIGH (ref 70–99)
GLUCOSE SERPL-MCNC: 133 MG/DL — HIGH (ref 70–99)
GLUCOSE SERPL-MCNC: 187 MG/DL — HIGH (ref 70–99)
GLUCOSE UR QL: NEGATIVE — SIGNIFICANT CHANGE UP
GRAM STN FLD: SIGNIFICANT CHANGE UP
GRAM STN FLD: SIGNIFICANT CHANGE UP
HCO3 BLDV-SCNC: 11 MMOL/L — LOW (ref 22–29)
HCT VFR BLD CALC: 28.5 % — LOW (ref 39–50)
HCT VFR BLD CALC: 30.5 % — LOW (ref 39–50)
HGB BLD-MCNC: 10 G/DL — LOW (ref 13–17)
HGB BLD-MCNC: 9.2 G/DL — LOW (ref 13–17)
INR BLD: 1.22 — HIGH (ref 0.88–1.16)
KETONES UR-MCNC: NEGATIVE — SIGNIFICANT CHANGE UP
LACTATE SERPL-SCNC: 2 MMOL/L — SIGNIFICANT CHANGE UP (ref 0.5–2)
LEUKOCYTE ESTERASE UR-ACNC: ABNORMAL
LYMPHOCYTES # BLD AUTO: 0.41 K/UL — LOW (ref 1–3.3)
LYMPHOCYTES # BLD AUTO: 0.53 K/UL — LOW (ref 1–3.3)
LYMPHOCYTES # BLD AUTO: 5.4 % — LOW (ref 13–44)
LYMPHOCYTES # BLD AUTO: 7 % — LOW (ref 13–44)
MACROCYTES BLD QL: SIGNIFICANT CHANGE UP
MACROCYTES BLD QL: SLIGHT — SIGNIFICANT CHANGE UP
MANUAL SMEAR VERIFICATION: SIGNIFICANT CHANGE UP
MANUAL SMEAR VERIFICATION: SIGNIFICANT CHANGE UP
MCHC RBC-ENTMCNC: 31.1 PG — SIGNIFICANT CHANGE UP (ref 27–34)
MCHC RBC-ENTMCNC: 31.9 PG — SIGNIFICANT CHANGE UP (ref 27–34)
MCHC RBC-ENTMCNC: 32.3 GM/DL — SIGNIFICANT CHANGE UP (ref 32–36)
MCHC RBC-ENTMCNC: 32.8 GM/DL — SIGNIFICANT CHANGE UP (ref 32–36)
MCV RBC AUTO: 96.3 FL — SIGNIFICANT CHANGE UP (ref 80–100)
MCV RBC AUTO: 97.4 FL — SIGNIFICANT CHANGE UP (ref 80–100)
METAMYELOCYTES # FLD: 6.3 % — HIGH (ref 0–0)
METAMYELOCYTES # FLD: 7 % — HIGH (ref 0–0)
MONOCYTES # BLD AUTO: 0.18 K/UL — SIGNIFICANT CHANGE UP (ref 0–0.9)
MONOCYTES # BLD AUTO: 0.25 K/UL — SIGNIFICANT CHANGE UP (ref 0–0.9)
MONOCYTES NFR BLD AUTO: 1.8 % — LOW (ref 2–14)
MONOCYTES NFR BLD AUTO: 4.4 % — SIGNIFICANT CHANGE UP (ref 2–14)
NEUTROPHILS # BLD AUTO: 4.72 K/UL — SIGNIFICANT CHANGE UP (ref 1.8–7.4)
NEUTROPHILS # BLD AUTO: 8.43 K/UL — HIGH (ref 1.8–7.4)
NEUTROPHILS NFR BLD AUTO: 70.3 % — SIGNIFICANT CHANGE UP (ref 43–77)
NEUTROPHILS NFR BLD AUTO: 72.8 % — SIGNIFICANT CHANGE UP (ref 43–77)
NEUTS BAND # BLD: 16.2 % — HIGH (ref 0–8)
NEUTS BAND # BLD: 8.8 % — HIGH (ref 0–8)
NITRITE UR-MCNC: NEGATIVE — SIGNIFICANT CHANGE UP
NRBC # BLD: 1 /100 — HIGH (ref 0–0)
NRBC # BLD: SIGNIFICANT CHANGE UP /100 WBCS (ref 0–0)
OVALOCYTES BLD QL SMEAR: SLIGHT — SIGNIFICANT CHANGE UP
PCO2 BLDV: 23 MMHG — LOW (ref 42–55)
PH BLDV: 7.29 — LOW (ref 7.32–7.43)
PH UR: 6 — SIGNIFICANT CHANGE UP (ref 5–8)
PLAT MORPH BLD: NORMAL — SIGNIFICANT CHANGE UP
PLAT MORPH BLD: NORMAL — SIGNIFICANT CHANGE UP
PLATELET # BLD AUTO: 194 K/UL — SIGNIFICANT CHANGE UP (ref 150–400)
PLATELET # BLD AUTO: 245 K/UL — SIGNIFICANT CHANGE UP (ref 150–400)
PO2 BLDV: 52 MMHG — HIGH (ref 25–45)
POIKILOCYTOSIS BLD QL AUTO: SIGNIFICANT CHANGE UP
POIKILOCYTOSIS BLD QL AUTO: SIGNIFICANT CHANGE UP
POLYCHROMASIA BLD QL SMEAR: SIGNIFICANT CHANGE UP
POLYCHROMASIA BLD QL SMEAR: SLIGHT — SIGNIFICANT CHANGE UP
POTASSIUM BLDV-SCNC: 4.5 MMOL/L — SIGNIFICANT CHANGE UP (ref 3.5–5.1)
POTASSIUM SERPL-MCNC: 4.5 MMOL/L — SIGNIFICANT CHANGE UP (ref 3.5–5.3)
POTASSIUM SERPL-MCNC: 4.6 MMOL/L — SIGNIFICANT CHANGE UP (ref 3.5–5.3)
POTASSIUM SERPL-MCNC: 4.8 MMOL/L — SIGNIFICANT CHANGE UP (ref 3.5–5.3)
POTASSIUM SERPL-SCNC: 4.5 MMOL/L — SIGNIFICANT CHANGE UP (ref 3.5–5.3)
POTASSIUM SERPL-SCNC: 4.6 MMOL/L — SIGNIFICANT CHANGE UP (ref 3.5–5.3)
POTASSIUM SERPL-SCNC: 4.8 MMOL/L — SIGNIFICANT CHANGE UP (ref 3.5–5.3)
PROT SERPL-MCNC: 5.8 G/DL — LOW (ref 6–8.3)
PROT SERPL-MCNC: 6.2 G/DL — SIGNIFICANT CHANGE UP (ref 6–8.3)
PROT SERPL-MCNC: 6.9 G/DL — SIGNIFICANT CHANGE UP (ref 6–8.3)
PROT UR-MCNC: 30 MG/DL
PROTHROM AB SERPL-ACNC: 14.5 SEC — HIGH (ref 10.5–13.4)
RBC # BLD: 2.96 M/UL — LOW (ref 4.2–5.8)
RBC # BLD: 3.13 M/UL — LOW (ref 4.2–5.8)
RBC # FLD: 17.3 % — HIGH (ref 10.3–14.5)
RBC # FLD: 17.4 % — HIGH (ref 10.3–14.5)
RBC BLD AUTO: ABNORMAL
RBC BLD AUTO: ABNORMAL
RBC CASTS # UR COMP ASSIST: < 5 /HPF — SIGNIFICANT CHANGE UP
RSV RNA NPH QL NAA+NON-PROBE: SIGNIFICANT CHANGE UP
SAO2 % BLDV: 82.4 % — SIGNIFICANT CHANGE UP (ref 67–88)
SARS-COV-2 RNA SPEC QL NAA+PROBE: SIGNIFICANT CHANGE UP
SCHISTOCYTES BLD QL AUTO: SLIGHT — SIGNIFICANT CHANGE UP
SMUDGE CELLS # BLD: PRESENT — SIGNIFICANT CHANGE UP
SODIUM SERPL-SCNC: 140 MMOL/L — SIGNIFICANT CHANGE UP (ref 135–145)
SODIUM SERPL-SCNC: 143 MMOL/L — SIGNIFICANT CHANGE UP (ref 135–145)
SODIUM SERPL-SCNC: 144 MMOL/L — SIGNIFICANT CHANGE UP (ref 135–145)
SP GR SPEC: 1.02 — SIGNIFICANT CHANGE UP (ref 1–1.03)
UROBILINOGEN FLD QL: 0.2 E.U./DL — SIGNIFICANT CHANGE UP
WBC # BLD: 5.79 K/UL — SIGNIFICANT CHANGE UP (ref 3.8–10.5)
WBC # BLD: 9.74 K/UL — SIGNIFICANT CHANGE UP (ref 3.8–10.5)
WBC # FLD AUTO: 5.79 K/UL — SIGNIFICANT CHANGE UP (ref 3.8–10.5)
WBC # FLD AUTO: 9.74 K/UL — SIGNIFICANT CHANGE UP (ref 3.8–10.5)
WBC UR QL: ABNORMAL /HPF

## 2022-01-01 PROCEDURE — 85025 COMPLETE CBC W/AUTO DIFF WBC: CPT

## 2022-01-01 PROCEDURE — 87637 SARSCOV2&INF A&B&RSV AMP PRB: CPT

## 2022-01-01 PROCEDURE — 87186 SC STD MICRODIL/AGAR DIL: CPT

## 2022-01-01 PROCEDURE — 81001 URINALYSIS AUTO W/SCOPE: CPT

## 2022-01-01 PROCEDURE — 80053 COMPREHEN METABOLIC PANEL: CPT

## 2022-01-01 PROCEDURE — 96366 THER/PROPH/DIAG IV INF ADDON: CPT

## 2022-01-01 PROCEDURE — 99214 OFFICE O/P EST MOD 30 MIN: CPT

## 2022-01-01 PROCEDURE — 71045 X-RAY EXAM CHEST 1 VIEW: CPT | Mod: 26

## 2022-01-01 PROCEDURE — 85730 THROMBOPLASTIN TIME PARTIAL: CPT

## 2022-01-01 PROCEDURE — 87040 BLOOD CULTURE FOR BACTERIA: CPT

## 2022-01-01 PROCEDURE — 82330 ASSAY OF CALCIUM: CPT

## 2022-01-01 PROCEDURE — 96368 THER/DIAG CONCURRENT INF: CPT

## 2022-01-01 PROCEDURE — 87086 URINE CULTURE/COLONY COUNT: CPT

## 2022-01-01 PROCEDURE — 71045 X-RAY EXAM CHEST 1 VIEW: CPT

## 2022-01-01 PROCEDURE — 96365 THER/PROPH/DIAG IV INF INIT: CPT

## 2022-01-01 PROCEDURE — 84132 ASSAY OF SERUM POTASSIUM: CPT

## 2022-01-01 PROCEDURE — 99291 CRITICAL CARE FIRST HOUR: CPT

## 2022-01-01 PROCEDURE — 99285 EMERGENCY DEPT VISIT HI MDM: CPT | Mod: 25

## 2022-01-01 PROCEDURE — 93010 ELECTROCARDIOGRAM REPORT: CPT

## 2022-01-01 PROCEDURE — 82803 BLOOD GASES ANY COMBINATION: CPT

## 2022-01-01 PROCEDURE — 85610 PROTHROMBIN TIME: CPT

## 2022-01-01 PROCEDURE — 99232 SBSQ HOSP IP/OBS MODERATE 35: CPT | Mod: GC

## 2022-01-01 PROCEDURE — 84295 ASSAY OF SERUM SODIUM: CPT

## 2022-01-01 PROCEDURE — 83605 ASSAY OF LACTIC ACID: CPT

## 2022-01-01 PROCEDURE — 36415 COLL VENOUS BLD VENIPUNCTURE: CPT

## 2022-01-01 PROCEDURE — 99215 OFFICE O/P EST HI 40 MIN: CPT | Mod: 95

## 2022-01-01 PROCEDURE — 92610 EVALUATE SWALLOWING FUNCTION: CPT

## 2022-01-01 RX ORDER — FUROSEMIDE 40 MG
10 TABLET ORAL EVERY 24 HOURS
Refills: 0 | Status: DISCONTINUED | OUTPATIENT
Start: 2022-01-01 | End: 2023-01-01

## 2022-01-01 RX ORDER — ACETAMINOPHEN 500 MG
975 TABLET ORAL ONCE
Refills: 0 | Status: DISCONTINUED | OUTPATIENT
Start: 2022-01-01 | End: 2022-01-01

## 2022-01-01 RX ORDER — ACETAMINOPHEN 500 MG
650 TABLET ORAL EVERY 6 HOURS
Refills: 0 | Status: DISCONTINUED | OUTPATIENT
Start: 2022-01-01 | End: 2023-01-01

## 2022-01-01 RX ORDER — EPOETIN ALFA-EPBX 40000 [IU]/ML
40000 INJECTION, SOLUTION INTRAVENOUS; SUBCUTANEOUS
Qty: 4 | Refills: 4 | Status: ACTIVE | COMMUNITY
Start: 2022-01-01 | End: 1900-01-01

## 2022-01-01 RX ORDER — MEMANTINE HYDROCHLORIDE 7 MG/1
7 CAPSULE, EXTENDED RELEASE ORAL DAILY
Qty: 90 | Refills: 3 | Status: ACTIVE | COMMUNITY
Start: 2020-02-11 | End: 1900-01-01

## 2022-01-01 RX ORDER — ONDANSETRON 8 MG/1
4 TABLET, FILM COATED ORAL EVERY 8 HOURS
Refills: 0 | Status: DISCONTINUED | OUTPATIENT
Start: 2022-01-01 | End: 2023-01-01

## 2022-01-01 RX ORDER — MORPHINE SULFATE 50 MG/1
2 CAPSULE, EXTENDED RELEASE ORAL
Qty: 100 | Refills: 0 | Status: DISCONTINUED | OUTPATIENT
Start: 2022-01-01 | End: 2023-01-01

## 2022-01-01 RX ORDER — CEFTRIAXONE 500 MG/1
2000 INJECTION, POWDER, FOR SOLUTION INTRAMUSCULAR; INTRAVENOUS EVERY 24 HOURS
Refills: 0 | Status: DISCONTINUED | OUTPATIENT
Start: 2022-01-01 | End: 2022-01-01

## 2022-01-01 RX ORDER — ROBINUL 0.2 MG/ML
0.2 INJECTION INTRAMUSCULAR; INTRAVENOUS EVERY 4 HOURS
Refills: 0 | Status: DISCONTINUED | OUTPATIENT
Start: 2022-01-01 | End: 2023-01-01

## 2022-01-01 RX ORDER — SODIUM BICARBONATE 1 MEQ/ML
650 SYRINGE (ML) INTRAVENOUS
Refills: 0 | Status: DISCONTINUED | OUTPATIENT
Start: 2022-01-01 | End: 2022-01-01

## 2022-01-01 RX ORDER — PATIROMER 8.4 G/1
8.4 POWDER, FOR SUSPENSION ORAL
Qty: 30 | Refills: 3 | Status: ACTIVE | COMMUNITY
Start: 2022-01-01 | End: 1900-01-01

## 2022-01-01 RX ORDER — HEPARIN SODIUM 5000 [USP'U]/ML
5000 INJECTION INTRAVENOUS; SUBCUTANEOUS EVERY 8 HOURS
Refills: 0 | Status: DISCONTINUED | OUTPATIENT
Start: 2022-01-01 | End: 2022-01-01

## 2022-01-01 RX ORDER — SODIUM BICARBONATE 650 MG/1
650 TABLET ORAL TWICE DAILY
Qty: 120 | Refills: 4 | Status: ACTIVE | COMMUNITY
Start: 2021-11-23 | End: 1900-01-01

## 2022-01-01 RX ORDER — ROBINUL 0.2 MG/ML
0.2 INJECTION INTRAMUSCULAR; INTRAVENOUS EVERY 6 HOURS
Refills: 0 | Status: DISCONTINUED | OUTPATIENT
Start: 2022-01-01 | End: 2022-01-01

## 2022-01-01 RX ORDER — MORPHINE SULFATE 50 MG/1
2 CAPSULE, EXTENDED RELEASE ORAL ONCE
Refills: 0 | Status: DISCONTINUED | OUTPATIENT
Start: 2022-01-01 | End: 2022-01-01

## 2022-01-01 RX ORDER — CALCITRIOL 0.25 UG/1
0.25 CAPSULE, LIQUID FILLED ORAL
Qty: 90 | Refills: 0 | Status: ACTIVE | COMMUNITY
Start: 2022-01-01 | End: 1900-01-01

## 2022-01-01 RX ORDER — ACETAMINOPHEN 500 MG
1000 TABLET ORAL ONCE
Refills: 0 | Status: COMPLETED | OUTPATIENT
Start: 2022-01-01 | End: 2022-01-01

## 2022-01-01 RX ORDER — SODIUM CHLORIDE 9 MG/ML
1850 INJECTION, SOLUTION INTRAVENOUS ONCE
Refills: 0 | Status: COMPLETED | OUTPATIENT
Start: 2022-01-01 | End: 2022-01-01

## 2022-01-01 RX ORDER — LANOLIN ALCOHOL/MO/W.PET/CERES
3 CREAM (GRAM) TOPICAL AT BEDTIME
Refills: 0 | Status: DISCONTINUED | OUTPATIENT
Start: 2022-01-01 | End: 2022-01-01

## 2022-01-01 RX ORDER — MORPHINE SULFATE 50 MG/1
2 CAPSULE, EXTENDED RELEASE ORAL
Refills: 0 | Status: DISCONTINUED | OUTPATIENT
Start: 2022-01-01 | End: 2023-01-01

## 2022-01-01 RX ORDER — MORPHINE SULFATE 50 MG/1
1.5 CAPSULE, EXTENDED RELEASE ORAL
Qty: 100 | Refills: 0 | Status: DISCONTINUED | OUTPATIENT
Start: 2022-01-01 | End: 2022-01-01

## 2022-01-01 RX ADMIN — MORPHINE SULFATE 2 MG/HR: 50 CAPSULE, EXTENDED RELEASE ORAL at 23:15

## 2022-01-01 RX ADMIN — CEFTRIAXONE 100 MILLIGRAM(S): 500 INJECTION, POWDER, FOR SOLUTION INTRAMUSCULAR; INTRAVENOUS at 07:00

## 2022-01-01 RX ADMIN — MORPHINE SULFATE 2 MILLIGRAM(S): 50 CAPSULE, EXTENDED RELEASE ORAL at 11:08

## 2022-01-01 RX ADMIN — MORPHINE SULFATE 2 MILLIGRAM(S): 50 CAPSULE, EXTENDED RELEASE ORAL at 13:15

## 2022-01-01 RX ADMIN — Medication 1000 MILLIGRAM(S): at 03:43

## 2022-01-01 RX ADMIN — MORPHINE SULFATE 2 MILLIGRAM(S): 50 CAPSULE, EXTENDED RELEASE ORAL at 12:08

## 2022-01-01 RX ADMIN — Medication 10 MILLIGRAM(S): at 11:07

## 2022-01-01 RX ADMIN — Medication 0.5 MILLIGRAM(S): at 13:52

## 2022-01-01 RX ADMIN — SODIUM CHLORIDE 1850 MILLILITER(S): 9 INJECTION, SOLUTION INTRAVENOUS at 21:22

## 2022-01-01 RX ADMIN — MORPHINE SULFATE 2 MILLIGRAM(S): 50 CAPSULE, EXTENDED RELEASE ORAL at 15:40

## 2022-01-01 RX ADMIN — ROBINUL 0.2 MILLIGRAM(S): 0.2 INJECTION INTRAMUSCULAR; INTRAVENOUS at 12:30

## 2022-01-01 RX ADMIN — MORPHINE SULFATE 1.5 MG/HR: 50 CAPSULE, EXTENDED RELEASE ORAL at 19:26

## 2022-01-01 RX ADMIN — Medication 400 MILLIGRAM(S): at 21:33

## 2022-01-01 RX ADMIN — Medication 0.5 MILLIGRAM(S): at 12:45

## 2022-01-01 RX ADMIN — ROBINUL 0.2 MILLIGRAM(S): 0.2 INJECTION INTRAMUSCULAR; INTRAVENOUS at 19:22

## 2022-01-01 RX ADMIN — MORPHINE SULFATE 1 MG/HR: 50 CAPSULE, EXTENDED RELEASE ORAL at 16:24

## 2022-01-01 RX ADMIN — MORPHINE SULFATE 2 MILLIGRAM(S): 50 CAPSULE, EXTENDED RELEASE ORAL at 17:40

## 2022-03-07 PROBLEM — R39.9 LOWER URINARY TRACT SYMPTOMS (LUTS): Status: ACTIVE | Noted: 2021-11-12

## 2022-03-07 PROBLEM — N17.9 AKI (ACUTE KIDNEY INJURY): Status: ACTIVE | Noted: 2020-12-29

## 2022-03-07 NOTE — CONSULT LETTER
[Dear  ___] : Dear  [unfilled], [Consult Letter:] : I had the pleasure of evaluating your patient, [unfilled]. [Please see my note below.] : Please see my note below. [Consult Closing:] : Thank you very much for allowing me to participate in the care of this patient.  If you have any questions, please do not hesitate to contact me. [Sincerely,] : Sincerely, [FreeTextEntry2] : Dr Jordan herrmann [FreeTextEntry3] : Sincerely, \par \par Alli Ashraf MD, FACP

## 2022-03-07 NOTE — ASSESSMENT
[FreeTextEntry1] : 87-year-old man with stage V CKD and no uremic symptoms or signs, in the setting of severe dementia and frailty.  The plan is to be supportive and optimize quality of life.  I have ordered labs to include CBC, BMP, and PTH.  He will return in 3 months.

## 2022-03-07 NOTE — HISTORY OF PRESENT ILLNESS
[FreeTextEntry1] : 87-year-old man with severe dementia and stage V CKD but no overt uremic signs or symptoms.  His creatinine was in the 6 range last fall and has not been checked since then.  His BUN was in the 60s with a GFR of 8 and a hemoglobin of about 10.  His caretaker states that he is cold all the time but no nausea, vomiting, pruritus, shortness of breath.  His BP is well controlled on amlodipine 5 mg daily and furosemide 20 mg daily.  He uses a walker.  There is no plan for dialysis because of his underlying dementia and frailty.  Our focus is on quality of life.

## 2022-06-06 NOTE — ASSESSMENT
[FreeTextEntry1] : 88-year-old man with a history of severe dementia, stage V CKD, well-controlled hypertension -he does have severe anemia and secondary hyperparathyroidism, but they are not currently causing any symptoms or complaints.  I am increasing his sodium bicarbonate to 2 3 times daily.  I am adding calcitriol 0.25 mcg daily.  He will have labs repeated in 2 months as a home draw by [facility].  That will include H&H, BMP, phosphorus, PTH, urinalysis.

## 2022-06-06 NOTE — HISTORY OF PRESENT ILLNESS
[FreeTextEntry1] : 88-year-old man with severe dementia and stage V CKD -remarkably, he has not developed overt uremic signs or symptoms.  There is no nausea or vomiting, no pruritus.  His appetite is excellent.  His caregiver, Alla, tells me that he had 2 waffles, 2 scrambled eggs, and fresh fruit for breakfast this morning.  His BP at home this morning was 133/77.  There is no plan for dialysis because of his age, frailty, and severe dementia.  Our goal is to keep him comfortable, which thus far has been possible.  He is anemic.  His hemoglobin is 7.1.  His K is 4.9 with a CO2 of 16.  Creatinine is 7.8 with a BUN of 75.  Calcium was 7.5 with a PTH of 888.  His caregiver states that he has no complaints whatsoever.  He does sleep a lot.

## 2022-07-13 NOTE — ED ADULT NURSE NOTE - TEMPLATE
[facility] Note:      Chief Complaint:   Encounter Diagnosis   Name Primary?   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 female who presents today for follow up of breast cancer after starting on NAET. ashli is tolerating anastrazole well without any issues. She had a follow-up B/L mammogram on 06/23 that showed decreased L breast mass now measuring 7 mm x 6 mm x 7 mm (previously  22 mm x 20 mm x 15 mm) Patient has met with Dr Kruger and will continue on Anastrazole.     Oncology History  Patient felt a mass in the left breast in October 2021  Diagnostic mammogram 10/25/21 demonstrated bilateral breast masses with recommendation for biopsy  Bilateral breast biopsy 12/20/21 showed a R breast IDC, grade 1, diffuse staining for estrogen and a L breast fibroadenoma   Patient's pathology was initially sent to [place], therefore no receptors were initially run    Patient has a history of HRT which she took for 10 years. She stopped these >10 years ago.         Patient Active Problem List   Diagnosis    Essential hypertension    Cholelithiasis    Refused influenza vaccine    Overweight (BMI 25.0-29.9)    Pre-diabetes    Mixed hyperlipidemia    Poor dentition    Chronic left shoulder pain    Decreased strength of upper extremity    Decreased functional mobility    Impaired range of motion of cervical spine    Malignant neoplasm of lower-outer quadrant of left breast of female, estrogen receptor positive       Current Outpatient Medications   Medication Instructions     "amLODIPine (NORVASC) 10 mg, Oral, Daily    anastrozole (ARIMIDEX) 1 mg, Oral, Daily    aspirin (ECOTRIN) 81 mg, Oral, Daily    atorvastatin (LIPITOR) 40 mg, Oral, Daily    cyclobenzaprine (FLEXERIL) 5 mg, Oral, 3 times daily PRN    diclofenac sodium (VOLTAREN) 2 g, Topical (Top), 4 times daily PRN    fish oil-omega-3 fatty acids 300-1,000 mg capsule 1 capsule, Oral, Daily    hydroCHLOROthiazide (HYDRODIURIL) 25 mg, Oral, Daily    LIDOcaine (LIDODERM) 5 % 1 patch, Transdermal, Daily, Remove & Discard patch within 12 hours or as directed by MD    valsartan (DIOVAN) 320 mg, Oral, Daily       Review of Systems:   Review of Systems   Constitutional: Negative.    HENT: Negative.    Respiratory: Negative.    Cardiovascular: Negative.    Gastrointestinal: Negative.    Musculoskeletal: Negative.    Neurological: Negative.    Endo/Heme/Allergies: Negative.    All other systems reviewed and are negative.      PHYSICAL EXAM:  BP (!) 153/74   Pulse 75   Resp 17   Ht 5' 6" (1.676 m)   Wt 82.1 kg (181 lb)   SpO2 97%   BMI 29.21 kg/m²     General Appearance:    Alert, cooperative, no distress, appears stated age   Lungs:     Clear to auscultation bilaterally, respirations unlabored    Heart:    Regular rate and rhythm, S1 and S2 normal   Breast Exam:    Bilateral breast normal. Unable to palpate L breast mass. No right masses, no tenderness, no skin or nipple abnormalities.  No lymphadenopathy.    Abdomen:     Soft, non-tender, bowel sounds active, no masses, no organomegaly   Extremities:   Extremities normal, atraumatic, no cyanosis or edema   Pulses:   2+ and symmetric all extremities   Skin:   Skin color, texture, turgor normal, no rashes or lesions   Lymph nodes:   Cervical, supraclavicular, and axillary nodes normal           Pertinent Labs & Imaging:  Diagnostic mammogram 10/25/21:   Impression:  Left  Mass: Left breast mass at the anterior 6 o'clock position. Assessment: 4 - Suspicious finding. Biopsy is " recommended.   Right  Mass: Right breast 6 mm mass at the 10 o'clock position. Assessment: 4 - Suspicious finding. Biopsy is recommended.   BI-RADS Category: Overall: 4 - Suspicious    Bilateral breast ultrasound 12/20/21  1. BREAST, LEFT, 06:00, BIOPSY:   -. Invasive ductal carcinoma, see [facility] Consultative Report below.   - Size of invasive carcinoma:  3 MM in greatest linear dimension within a single core biopsy fragment.   - [place] Histologic Score: Grade 1 of 3.                     Tubule Formation:  2                     Nuclear Pleomorphism:  2                     Mitotic Activity:  1   - No definitive in-situ component identified.   - No lymphovascular invasion.   - Microcalcifications:  Not identified.   - Breast biomarkers will be performed upon return of materials from [facility].   2. BREAST, RIGHT, 10:00, BIOPSY:   - Benign breast tissue with stromal fibrosis and fibroadenomatoid nodules.   - Microcalcifications:  Not identified.   - Negative for atypia or malignancy.     [place] FINAL DIAGNOSIS:   Interpretation   Breast, core biopsy (12/20/2021):   1. Left breast, 6:00: Invasive ductal carcinoma with prominent cribriform growth pattern, Aminta grade I (of III).   The submitted immunostains for SMA, p63 and CK 5/6 show absence of myoepithelial cell layer in the tumor. The tumor is diffusely positive for ER. These findings support the above diagnosis.   2. Right breast, 10:00: Fibroadenomatoid nodule.          No results found for this or any previous visit (from the past 24 hour(s)).    Assessment & Plan:    1. Malignant neoplasm of lower-outer quadrant of left breast of female, estrogen receptor positive    Patient is on with anastrozole, initially as NAET intent. Overall she is tolerating this well without major side effects.   Follow up breast imaging on 06/23/22 showed IL to therapy  She met with Dr. Seo and has chosen not to proceed with surgery at this point. Will plan for AI  indefinitely.     We did discuss that one of the possible side effects while on an AI is bone loss or osteoporosis. To help prevent this possible side effect, we advised that she continue taking daily Calcium and Vitamin D supplements. The daily recommended doses are 1000 IU of Vitamin D and 1200mg of Calcium. She can buy these supplements, such as Oscal-D® or Caltrate®, at most local stores. If she has osteopenia or osteoporosis on bone density, we will discuss Prolia in the near future.     Joint pain is a common side effect of an AI. Pain and aching in the bones and joints can range from mild discomfort that goes away by itself, to severe achiness that may require medication. We have suggested using over-the-counter, non-steroidal anti-inflammatory medications like Ibuprofen if she were to experience this side effect.       Patient is agreeable to this plan.       Med Onc Chart Routing      Follow up with physician 2 months. Already scheduled. Thanks    Follow up with CHRISTINA    Infusion scheduling note    Injection scheduling note    Labs None   Lab interval:     Imaging    L breast ultrasound    Pharmacy appointment    Other referrals            Total time of this visit, including time spent face to face with patient and/or via video/audio, and also in preparing for today's visit for MDM and documentation. (Medical Decision Making, including consideration of possible diagnoses, management options, complex medical record review, review of diagnostic tests and information, consideration and discussion of significant complications based on comorbidities, and discussion with providers involved with the care of the patient) 30 minutes. Greater than 50% was spent face to face with the patient counseling and coordinating care.      Aliza Olvera MD  07/14/2022           Cardiac

## 2022-08-08 PROBLEM — Z82.0 FAMILY HISTORY OF ALZHEIMER'S DISEASE: Status: ACTIVE | Noted: 2018-08-07

## 2022-08-08 PROBLEM — G30.9 DEMENTIA OF THE ALZHEIMER'S TYPE: Status: ACTIVE | Noted: 2018-08-07

## 2022-08-08 PROBLEM — Z85.828 HISTORY OF SQUAMOUS CELL CARCINOMA OF SKIN: Status: RESOLVED | Noted: 2018-08-07 | Resolved: 2022-01-01

## 2022-08-08 PROBLEM — Z85.46 HISTORY OF MALIGNANT NEOPLASM OF PROSTATE: Status: RESOLVED | Noted: 2018-08-07 | Resolved: 2022-01-01

## 2022-08-08 NOTE — ASSESSMENT
[FreeTextEntry1] : 88-year-old man with severe dementia and stage V CKD with severe anemia and secondary hyperparathyroidism.  All are being treated medically, with no plan for dialysis.  I have ordered labs to be repeated in 2 months, to include BMP, H&H, iron/TIBC, PTH.

## 2022-08-08 NOTE — HISTORY OF PRESENT ILLNESS
[FreeTextEntry1] : 88-year-old man with severe dementia and stage V CKD with severe anemia.  He has a superb caregiver, Alla, who accompanies him each visit.  Remarkably, he has not developed overt uremic signs or symptoms.  He continues to eat with no nausea or vomiting, and no apparent pruritus.  His creatinine is stable at 8, BUN 94, K5.4, CO2 17 despite an increase in sodium bicarbonate.  Calcium is 8.3 with a PTH of 610, despite calcitriol.  His phosphorus is only 4.5.  Hemoglobin 6.6 with a TF sat of 35.  I ordered Retacrit 40,000 units every 3 weeks.

## 2022-10-10 NOTE — ASSESSMENT
[FreeTextEntry1] : 88-year-old man with severe dementia, and CKD 5 with anemia -but no uremic symptoms or signs.  He eats well, has no pericardial rub or asterixis.  Alla keeps him comfortable -he is clearly in no pain and responds to her very well.  I have ordered labs to be repeated in 2 months to include BMP, H&H, iron/TIBC.  I have asked Alla to increase his sodium bicarb and it to 2 tabs 3 times daily in an effort to better control metabolic acidosis and hyperkalemia.  The goal remains patient comfort with no plans for dialysis.

## 2022-10-10 NOTE — HISTORY OF PRESENT ILLNESS
[FreeTextEntry1] : 88-year-old man with severe dementia, stage V CKD, severe anemia of CKD, secondary hyperparathyroidism -his caregiver, Alla , takes incredibly good care of him.  Remarkably, he continues to eat with no nausea or vomiting.  His creatinine is 9.3 with a BUN of 98, phosphorus 4.8,  despite calcitriol, hemoglobin has risen from 6.6 up to 8.6 with Retacrit, TF sat 18%, K5.6, CO2 14, despite sodium bicarb 650 mg, 4/day.  His albumin is 3.9 and he continues to eat.  There is no plan for dialysis.  Our goal is simply patient comfort.

## 2022-12-12 PROBLEM — E87.2 METABOLIC ACIDOSIS: Status: ACTIVE | Noted: 2021-11-23

## 2022-12-12 PROBLEM — E87.5 HYPERKALEMIA: Status: ACTIVE | Noted: 2022-01-01

## 2022-12-12 PROBLEM — N25.81 HYPERPARATHYROIDISM, SECONDARY: Status: ACTIVE | Noted: 2021-12-06

## 2022-12-12 PROBLEM — N18.5 CKD (CHRONIC KIDNEY DISEASE), STAGE V: Status: ACTIVE | Noted: 2020-07-29

## 2022-12-12 PROBLEM — I10 BENIGN ESSENTIAL HYPERTENSION: Status: ACTIVE | Noted: 2018-08-07

## 2022-12-12 PROBLEM — N18.9 ANEMIA IN CHRONIC KIDNEY DISEASE: Status: ACTIVE | Noted: 2020-07-29

## 2022-12-12 NOTE — HISTORY OF PRESENT ILLNESS
[Home] : at home, [unfilled] , at the time of the visit. [Medical Office: ([facility])___] : at the medical office located in  [Verbal consent obtained from patient] : the patient, [unfilled] [FreeTextEntry1] : Discussed with patient : You have chosen to receive care through the use of tele-media.  It enables healthcare providers at different locations to provide safe, effective, and convenient care through the use of technology.  Please note this is a billable encounter.  As with any healthcare service, there are risks associated with the use of tele-media, including  issues.  You understand that I cannot physically examine you and that you may need to come to the office to complete the assessment.   Patient agreed verbally and understands the risks and benefits of tele-media as explained.  All questions regarding tele-media encounters were answered.\par                                                                                                                                                                                                               88-year-old man with severe dementia, and stage V CKD with severe anemia, secondary hyperparathyroidism, metabolic acidosis, hyperkalemia.  His caregiver/aide at home is Alla, with an alternate who covers at night.  She is remarkably devoted and competent.  His renal function has steadily declined, so that his creatinine is now up to 10.5 with a BUN of 96, GFR 4, hemoglobin is up to 9.3 from the range of 6-7 originally.  His TF sat is 22.  His most recent K is 6.4 with a CO2 of 18.  He is on sodium bicarbonates 650 mg, 2 twice daily, as well as Veltassa 8.4 g twice daily since December 7 in response to the December 6 labs.  Amazingly, he has not developed anorexia, nausea, or vomiting.  He does have some tremulousness intermittently.  Today's visit was a three-way video conference involving his son Waldemar, as well as Alla.  We covered a wide-ranging group of topics including whether or not to consider transfer to [facility] for hospice care -I submitted a 2 page report in October , but the family has not heard anything back in response.  They will check to see if it was indeed received at [facility].  Waldemar's mother, the patient's wife, was in [facility] hospice care earlier this year prior to her death.  In the end, we concluded that he would probably be more comfortable in the home environment with Alla and her colleague, with whom the patient is very familiar.  Another question that emerged is how to treat symptoms that might arise, such as nausea and vomiting.  We concluded that the services of an NP by the name of Alexander who they utilized previously with the patient's wife, would be ideal to come in on some regular basis in case parenteral hydralazine or other meds are necessary.  He did experience diarrhea 2 weeks ago, which responded to Pepto-Bismol.

## 2022-12-12 NOTE — ASSESSMENT
[FreeTextEntry1] : 88-year-old man with virtually end-stage renal failure but without the usual classic uremic symptoms yet, in the context of severe dementia and recent loss of his wife -the goal here all along has been patient comfort.  There is no plan for dialysis.  We has a group concluded that he would be best served staying in his home environment with agencies comfortable with for now, while still finding out if [facility] hospice care is a possibility.  The patient's son Waldemar will try to identify whether the NP named Alexander would be available for the purpose mentioned above.  I have ordered labs to be repeated to include BMP and plasma potassium.  The goal there is simply to try to keep his potassium within a nonlife threatening range. Time spent 50 min

## 2022-12-12 NOTE — PHYSICAL EXAM
[General Appearance - Alert] : alert [General Appearance - In No Acute Distress] : in no acute distress [Sclera] : the sclera and conjunctiva were normal [PERRL With Normal Accommodation] : pupils were equal in size, round, and reactive to light [Extraocular Movements] : extraocular movements were intact [Outer Ear] : the ears and nose were normal in appearance [Neck Appearance] : the appearance of the neck was normal [Neck Cervical Mass (___cm)] : no neck mass was observed [Jugular Venous Distention Increased] : there was no jugular-venous distention [Auscultation Breath Sounds / Voice Sounds] : lungs were clear to auscultation bilaterally [Heart Rate And Rhythm] : heart rate was normal and rhythm regular [Heart Sounds] : normal S1 and S2 [Heart Sounds Gallop] : no gallops [Murmurs] : no murmurs [Heart Sounds Pericardial Friction Rub] : no pericardial rub [Edema] : there was no peripheral edema [Bowel Sounds] : normal bowel sounds [Abdomen Soft] : soft [Abdomen Tenderness] : non-tender [Abdomen Mass (___ Cm)] : no abdominal mass palpated [Cervical Lymph Nodes Enlarged Posterior Bilaterally] : posterior cervical [Cervical Lymph Nodes Enlarged Anterior Bilaterally] : anterior cervical [Supraclavicular Lymph Nodes Enlarged Bilaterally] : supraclavicular [No CVA Tenderness] : no ~M costovertebral angle tenderness [Nail Clubbing] : no clubbing  or cyanosis of the fingernails [Musculoskeletal - Swelling] : no joint swelling seen [Skin Turgor] : normal skin turgor [Skin Color & Pigmentation] : normal skin color and pigmentation [] : no rash [Deep Tendon Reflexes (DTR)] : deep tendon reflexes were 2+ and symmetric [No Focal Deficits] : no focal deficits [Oriented To Time, Place, And Person] : oriented to person, place, and time [Affect] : the affect was normal

## 2022-12-30 NOTE — ED ADULT TRIAGE NOTE - ARRIVAL INFO ADDITIONAL COMMENTS
HHA called 911 for sob and subjective fever today.  upon ems arrival pt's pulse ox 84%, now 94 on 100% nrbm.

## 2022-12-30 NOTE — ED ADULT NURSE NOTE - CAS TRG GEN SKIN COLOR
[facility]  Dr. Mahad Bar Progress Note     Subjective:   Loulou Tejada is a(n) 54year old female. Current complaints: Patient presents with:  Rheumatoid Arthritis: fu 4mo-labs 6/14/19-vit d 44-RAPID 4. 2-Pt having problems with bilateral ankles.
Normal for race

## 2022-12-30 NOTE — ED ADULT NURSE NOTE - OBJECTIVE STATEMENT
Pt with pmx of Alzheimer Dementia (AAOx1 at baseline), CKD, HTN, HLD, Prostate CA, BPH, is brought in to ED by home health aid due to sob accompanied by fever.All needs attended. Pt on cardiac monitor. Rounding in progress. Fall risk precautions maintained.

## 2022-12-30 NOTE — ED PROVIDER NOTE - CLINICAL SUMMARY MEDICAL DECISION MAKING FREE TEXT BOX
Ddx: Sepsis/ ro pna/ uti/ viral syndrome  Plan: Cbc, cmp, lactate, blood cx, cxr, flu swab, fluids, admission

## 2022-12-30 NOTE — ED ADULT NURSE NOTE - NSIMPLEMENTINTERV_GEN_ALL_ED
Implemented All Fall with Harm Risk Interventions:  [place] to call system. Call bell, personal items and telephone within reach. Instruct patient to call for assistance. Room bathroom lighting operational. Non-slip footwear when patient is off stretcher. Physically safe environment: no spills, clutter or unnecessary equipment. Stretcher in lowest position, wheels locked, appropriate side rails in place. Provide visual cue, wrist band, yellow gown, etc. Monitor gait and stability. Monitor for mental status changes and reorient to person, place, and time. Review medications for side effects contributing to fall risk. Reinforce activity limits and safety measures with patient and family. Provide visual clues: red socks.

## 2022-12-30 NOTE — ED PROVIDER NOTE - OBJECTIVE STATEMENT
Pt is a 87 yo gentleman with a pmhx of HTN, HL, CKD, Alzheimer's dementia who presents to the ED with sob. De saturated to 85s at home per aide. Patient not oxygen dependent, DNR/DNI per daughter Trish [phone]. Patient has had hx of PNA and UTIs in the past. No complaints of any pain, is at baseline mental status per aide.

## 2022-12-31 NOTE — H&P ADULT - PROBLEM SELECTOR PLAN 10
F: s/p 1.850 cc  E: replete judiciously give CKD 5  N: NPO   DVT: heparin SQ    dispo: 7lach telemetry  code: DNR/DNI, no dialysis. Limited medical interventions. MOLST to be filled in AM

## 2022-12-31 NOTE — PROGRESS NOTE ADULT - PROBLEM SELECTOR PLAN 9
pearly 1x1cm papular lesion w/ telangiectasias on left nostril, verrucous lesions along head and neck w/ most prominent on the left upper eyelid, seborrheic keratoses along trunk likely Basal Cell Carcinoma.  - no intervention at this time
pearly 1x1cm papular lesion w/ telangiectasias on left nostril, verrucous lesions along head and neck w/ most prominent on the left upper eyelid, seborrheic keratoses along trunk  likely Basal Cell Carcinoma

## 2022-12-31 NOTE — H&P ADULT - ASSESSMENT
88M with PMHx alzheimer's dementia (AAOx0-1 baseline), CKD, HTN, HLD, prostate ca, BPH presents with two day duration of cough, SOB, fever, and fatigue found to influenza A and admitted for AHRF and sepsis management.

## 2022-12-31 NOTE — H&P ADULT - PROBLEM SELECTOR PLAN 1
Likely 2/2 influenza A. Met ¾ SIRS (febrile, tachy, tachypnic) with likely resp source. Arrived with increased WOB and reportedly hypoxic requiring NRB and weaned to 6L NC saturating 95% in ED. However, on exam patient with significant WOB and accessory muscle use on nasal canula and in resp distress. Daughter (HCP) is amenable for patient to trial HFNC and bipap but would not want intubation. Mental status poor for bipap.  - HFNC start at 40/40, titrate for goal O2 >92%  - Monitor WOB  - f/u blood and urine culture  - Unable to receive tamiflu 2/2 renal impairment. Symptomatic management of flu.   #Influenza A  - Unable to receive tamiflu 2/2 to renal function or can try tamiflu 30mg every other day per UpToDate dosing. Likely 2/2 influenza A. Met ¾ SIRS (febrile, tachy, tachypnic) with likely resp source. Arrived with increased WOB and reportedly hypoxic requiring NRB and weaned to 6L NC saturating 95% in ED. However, on exam patient with significant WOB and accessory muscle use on nasal canula and in resp distress. Daughter (HCP) is amenable for patient to trial HFNC and bipap but would not want intubation. Mental status poor for bipap.  - HFNC start at 40/40, titrate for goal O2 >92%  - Monitor WOB  - f/u blood and urine culture  - Unable to receive tamiflu 2/2 renal impairment. Symptomatic management of flu.   - Ceftriaxone 2G  #Influenza A  - Unable to receive tamiflu 2/2 to renal function or can try tamiflu 30mg every other day per UpToDate dosing.

## 2022-12-31 NOTE — PROGRESS NOTE ADULT - ASSESSMENT
88M with PMHx alzheimer's dementia (AAOx0-1 baseline), CKD, HTN, HLD, prostate ca, BPH presents with two day duration of cough, SOB, fever, and fatigue found to influenza A and admitted for AHRF and sepsis management. - now comfort care. 
Patient is an 89 yo M with past medical Hx of Alzheimer's dementia (AAO x 0-1 baseline), CKD, HTN, HLD, prostate ca, BPH presents with two day duration of cough, SOB, fever, and fatigue found to have influenza A and admitted for acute hypoxic respiratory failure and sepsis management., now transitioned to comfort care.

## 2022-12-31 NOTE — H&P ADULT - PROBLEM SELECTOR PLAN 9
pearly 1x1cm papular lesion w/ telangiectasias on left nostril, verrucous lesions along head and neck w/ most prominent on the left upper eyelid, seborrheic keratoses along trunk  likely Basal Cell Carcinoma

## 2022-12-31 NOTE — PROGRESS NOTE ADULT - PROBLEM SELECTOR PLAN 2
opacities appear more consolidated than groundglass. likely has concomitant bacterial pneumonia
- comfort measures

## 2022-12-31 NOTE — H&P ADULT - PROBLEM SELECTOR PLAN 8
Likely 2/2 AOCD vs less likely WILLARD. No active sign of bleed at this time.  - Transfuse for Hb <7  - Maintain active T&S

## 2022-12-31 NOTE — H&P ADULT - NSHPLABSRESULTS_GEN_ALL_CORE
LABS:                          10.0   9.74  )-----------( 245      ( 30 Dec 2022 21:05 )             30.5     12-30    140  |  104  |  141<H>  ----------------------------<  133<H>  4.5   |  11<L>  |  11.17<H>    Ca    8.5      30 Dec 2022 23:03    TPro  5.8<L>  /  Alb  2.7<L>  /  TBili  0.3  /  DBili  x   /  AST  20  /  ALT  13  /  AlkPhos  85  12-30    PT/INR - ( 30 Dec 2022 21:05 )   PT: 14.5 sec;   INR: 1.22          PTT - ( 30 Dec 2022 21:05 )  PTT:30.6 sec        CXR Patchy opacities in right lung, low lung volumes

## 2022-12-31 NOTE — PROGRESS NOTE ADULT - PROBLEM SELECTOR PLAN 4
Follows nephrologist Dr. Alli Ashraf. Decision made 2 years ago to not undergo dialysis. Patient makes urine and incontinent in diaper. On home sodium bicarb 650mg 2 tabs BID, epoetin doug [id number], calcitriol 0.25mcg.  and Cr 13.06 on admission.  Family not interested in dialysis at this time, will continue to discuss  - comfort measures only
Follows nephrologist Dr. Alli Ashraf. Decision made 2 years ago to not undergo dialysis. Patient makes urine and incontinent in diaper. On home sodium bicarb 650mg 2 tabs BID, epoetin doug [id number], calcitriol 0.25mcg.  and Cr 13.06 on admission.  Family not interested in dialysis at this time, will continue to discuss.  - comfort measures only

## 2022-12-31 NOTE — PROGRESS NOTE ADULT - PROBLEM SELECTOR PLAN 3
- comfort measures
EKG sinus tachycardia likely 2/2 increased WOB. Heart rate improved since admission however remains tachycardic

## 2022-12-31 NOTE — CHART NOTE - NSCHARTNOTEFT_GEN_A_CORE
I spoke with the patients HCP Trish Lee, as well as her brother Waldemar on the phone at 10AM on 12/31/2022 to fill out a MOLST and establish goals of care. After further explanation of their fathers condition, the family made the decision to proceed with comfort measures only. The family would prefer that the patient stays on HFNC at this time to lessen his breathing burden, and that he have PRN's available for air hunger. We covered the orders that will be placed and the family had no further questions at this time. The MOLST was filled out and will be present in the chart. The family was given my pager so that they could reach out to me with any further questions. The family is Confucianist and recommends that the patient not be sent to the morgue when he dies.     Alex Oswald, DO   Medicine Resident

## 2022-12-31 NOTE — H&P ADULT - PROBLEM SELECTOR PLAN 2
- treat as above opacities appear more consolidated than groundglass. likely has concomitant bacterial pneumonia    - treat as above

## 2022-12-31 NOTE — PROGRESS NOTE ADULT - ATTENDING COMMENTS
Patient is an 87 yo M with past medical Hx of Alzheimer's dementia (AAO x 0-1 baseline), CKD, HTN, HLD, prostate ca, BPH presents with two day duration of cough, SOB, fever, and fatigue found to have influenza A and admitted for acute hypoxic respiratory failure and sepsis management., now transitioned to comfort care.    #Comfort measures - morphine guttae with PRN IVP as needed for air hunger and pain, glycopyrrolate for secretions, no blood draws, mews exempt, no escalation of care. Will increase morphine guttae as needed. Comfort feeds.   #Acute hypoxic respiratory failure 2/2 influenza - will continue HFNC for air hunger, patients family is in agreement

## 2022-12-31 NOTE — H&P ADULT - PROBLEM SELECTOR PLAN 6
AAOx0-1 at baseline per HHA. Takes home memantine 7mg QD  - Bedside dysphagia screen and if pass can c/w memantine 7mg QD  - Otherwise S&S consult, and further GOC regarding feeding tube.,

## 2022-12-31 NOTE — H&P ADULT - PROBLEM/PLAN-1
Internal medicine progress note.    Subjective: Pt is awake and comfortable.  But limited interaction   Bed bound.      ROS limited       Vitals:    03/15/18 0831   BP: 120/60   Pulse:    Resp: 18   Temp: 97.9 °F (36.6 °C)          HENT: Right hemicranium bone defect  Skin: warm and dry  Neck: supple  Lung: scattered crackles but adequate air flow   CVS: S 1 and S 2  Abdomen: bowel sound present, +PEG  CNS: Cranial nerves are intact  +Nonverbal  Motor and sensory are unchanged.        Recent Labs  Lab 03/13/18  0605   WBC 8.3   RBC 2.99*   HGB 8.0*   HCT 25.9*               Recent Labs  Lab 03/15/18  0505 03/14/18  1412 03/14/18  0414 03/13/18  0605  03/12/18  0520   SODIUM  --   --   --   --   --  137   POTASSIUM 3.9 4.5 3.8 4.2  < > 4.0   CHLORIDE  --   --   --   --   --  103   CO2  --   --   --   --   --  25   BUN  --   --   --   --   --  30*   CREATININE 1.03*  --  1.00* 1.10*  --  0.96*   GLUCOSE  --   --   --   --   --  101*   CALCIUM  --   --   --   --   --  8.2*   < > = values in this interval not displayed.      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  To 40 mg po qhs   On cefepime for UTI  May changed to cipro on discharge  Tube feeing   Discussed with  in deail.    Amlodipine and Losartan  Baclofen  Sc Heparin  Hydralazine  Keppra  ____________________________________________________________  Jackie Sandoval acting as a scribe for Dr. Dumont     I have reviewed the information recorded by the scribe for accuracy and agree with its  contents.     Dr. Tim MD  Physician #:[id number]       DISPLAY PLAN FREE TEXT

## 2022-12-31 NOTE — H&P ADULT - NSHPPHYSICALEXAM_GEN_ALL_CORE
General: NAD, on nonrebreather  Head: NC/AT; MMM; PERRL; EOMI;  Neck: Supple; no JVD  Respiratory: bronchial breath sounds noted in left lung w/ transmitted upper airway sounds auscultated throughtout  Cardiovascular: Regular rhythm/rate; S1/S2+, no murmurs, rubs gallops   Gastrointestinal: Soft; slightly distended; bowel sounds normal and present, abdominal breathing, no rebound, no guarding  Extremities: WWP; no edema/cyanosis  Neurological: A&Ox0, tracks past midline, moves all extremities   Integument: pearly 1x1cm papular lesion w/ telangiectasias on left nostril, verrucous lesions along head and neck w/ most prominent on the left upper eyelid, seborrheic keratoses along trunk

## 2022-12-31 NOTE — SWALLOW BEDSIDE ASSESSMENT ADULT - ADDITIONAL RECOMMENDATIONS
Pt is CMO, not appropriate for pleasure feeds at this time d/t lethargy. Defer to palliative for further recommendations.  d/c from SLP services, reconsult with improved mental status/respiratory status.

## 2022-12-31 NOTE — PROGRESS NOTE ADULT - PROBLEM SELECTOR PLAN 5
- holding antihypertensives
On home amlodipine 2.5mg QD  - Hold in setting of sepsis, restart as appropriate

## 2022-12-31 NOTE — CONSULT NOTE ADULT - ASSESSMENT
88M with PMHx alzheimer's dementia (AAOx0-1 baseline), CKD, HTN, HLD, prostate ca, BPH presents with two day duration of cough, SOB, fever, and fatigue found to influenza A and admitted for AHRF and sepsis management.      Neuro  #Alzheimer's dementia  AAOx0-1 at baseline per HHA. Takes home memantine 7mg QD  - Bedside dysphagia screen and if pass can c/w memantine 7mg QD    Card  #HTN  On home amlodipine 2.5mg QD  - Hold in setting of sepsis, restart as appropriate    #HLD  - c/w atorvastatin 20mg qhs    #Sinus tachycardia  EKG sinus tachycardia likely 2/2 increased WOB. Heart rate improved since admission however remains tachycardic  - Monitor HR and management of AHRF as below    Resp  #Acute hypoxic respiratory failure  Likely 2/2 influenza A. Met ¾ SIRS (febrile, tachy, tachypnic) with likely resp source. Arrived with increased WOB and reportedly hypoxic requiring NRB and weaned to 6L NC saturating 95% in ED. However, on exam patient with significant WOB and accessory muscle use on nasal canula and in resp distress. Daughter (HCP) is amenable for patient to trial HFNC and bipap but would not want intubation. Mental status poor for bipap.  - HFNC start at 40/40, titrate for goal O2 >92%  - Monitor WOB  - f/u blood and urine culture  - Tamiflu for 5 days    GI  No acute illness    Renal  #CKD5  Follows nephrologist Dr. Alli Ashraf. Decision made 2 years ago to not undergo dialysis. Patient makes urine and incontinent in diaper. On home sodium bicarb 650mg 2 tabs BID, epoetin doug [id number], calcitriol 0.25mcg.  and Cr 13.06 on admission  - c/w home meds  - Trend BUN and Cr  - Avoid nephrotic meds and renally dose meds    #Uremia  Likely 2/2 underlying CKD  - Monitor BUN  - Family not interested in dialysis at this time, will continue to discuss    #AGMA  HCO3 11 and AG 29. Likely 2/2 worsening CKD given Cr 13 with baseline 4-5s  - Management as above for CKD      #BPH  - Place hopper  - c/w tamsulosin 0.4mg QD    Endo  No active issue    Heme/onc  #Anemia  Likely 2/2 AOCD vs less likely WILLARD. No active sign of bleed at this time.  - Transfuse for Hb <7  - Maintain active T&S    Infectious  #Influenza A  Day 2 of symptoms however as patient is high risk and hospitalized would start anti-viral treatment  - Start tamiflu 30mg BID (adjust for crcl)    #Sepsis  Met ¾ SIRS (febrile, tachy, tachypnic) with likely resp source  - Management as above for AHRF  -follow up blood and urine culture 88M with PMHx alzheimer's dementia (AAOx0-1 baseline), CKD, HTN, HLD, prostate ca, BPH presents with two day duration of cough, SOB, fever, and fatigue found to influenza A and admitted for AHRF and sepsis management.      Neuro  #Alzheimer's dementia  AAOx0-1 at baseline per HHA. Takes home memantine 7mg QD  - Bedside dysphagia screen and if pass can c/w memantine 7mg QD  - Otherwise S&S consult, and further GOC regarding feeding tube.,     Card  #HTN  On home amlodipine 2.5mg QD  - Hold in setting of sepsis, restart as appropriate    #HLD  - c/w atorvastatin 20mg qhs    #Sinus tachycardia  EKG sinus tachycardia likely 2/2 increased WOB. Heart rate improved since admission however remains tachycardic  - Monitor HR and management of AHRF as below    Resp  #Acute hypoxic respiratory failure  Likely 2/2 influenza A. Met ¾ SIRS (febrile, tachy, tachypnic) with likely resp source. Arrived with increased WOB and reportedly hypoxic requiring NRB and weaned to 6L NC saturating 95% in ED. However, on exam patient with significant WOB and accessory muscle use on nasal canula and in resp distress. Daughter (HCP) is amenable for patient to trial HFNC and bipap but would not want intubation. Mental status poor for bipap.  - HFNC start at 40/40, titrate for goal O2 >92%  - Monitor WOB  - f/u blood and urine culture  - Unable to receive tamiflu 2/2 renal impairment. Symptomatic management of flu.     GI  No acute illness    Renal  #CKD5  Follows nephrologist Dr. Alli Ashraf. Decision made 2 years ago to not undergo dialysis. Patient makes urine and incontinent in diaper. On home sodium bicarb 650mg 2 tabs BID, epoetin doug [id number], calcitriol 0.25mcg.  and Cr 13.06 on admission  - c/w home meds  - Trend BUN and Cr  - Avoid nephrotic meds and renally dose meds  - Nephrology consult in AM to guide management given no dialysis per GOC    #Uremia  Likely 2/2 underlying CKD  - Monitor BUN  - Family not interested in dialysis at this time, will continue to discuss    #AGMA  HCO3 11 and AG 29. Likely 2/2 worsening CKD given Cr 13 with baseline 4-5s  - Management as above for CKD      #BPH  - Place hopper  - c/w tamsulosin 0.4mg QD    Endo  No active issue    Heme/onc  #Anemia  Likely 2/2 AOCD vs less likely WILLARD. No active sign of bleed at this time.  - Transfuse for Hb <7  - Maintain active T&S    Infectious  #Influenza A  Day 2 of symptoms however as patient is high risk and hospitalized would start anti-viral treatment  - Unable to receive tamiflu 2/2 to renal function or can try tamiflu 30mg every other day per UpToDate dosing.    #Sepsis  Met ¾ SIRS (febrile, tachy, tachypnic) with likely resp source  - Management as above for AHRF  -follow up blood and urine culture    .  F: s/p 1.850 cc  E: replete judiciously give CKD 5  N: NPO   DVT: heparin SQ    dispo: 7lach telemetry  code: DNR/DNI, no dialysis. Limited medical interventions. MOLST to be filled in AM    Discussed care with Dr. Valdivia.

## 2022-12-31 NOTE — PROGRESS NOTE ADULT - SUBJECTIVE AND OBJECTIVE BOX
TRANSFER FROM [facility] TO [facility]    HC:  88M with PMHx alzheimer's dementia (AAOx0-1 baseline), CKD, HTN, HLD, prostate ca, BPH presents with two day duration of cough, SOB, fever, and fatigue. Patient unable to provide history given dementia and history obtained for health aid Morena and daughter Sarah. Yesterday, health aid noted some cough and SOB and today noticed worsening of symptoms with WOB as well as fever and increased lethargy. No recent illness per health aid and daughter. Patient follows nephrologist Dr. Alli Ashraf and decision was made 2 years ago not to pursue dialysis and to medically manage his CKD. Of note since 1 month ago, patient has had decreased ability to walk due to tremors which ,per daughter, nephrologist attributed to worsening of patient's CKD. Patient has not received flu vaccine. Is covid vaccinated but not boosted. Unable to obtain ROS as patient non-verbal. Following extensive discussion with the family, the patient was made comfort care and will be transferred to [facility].       INTERVAL EVENTS: RONALDO    SUBJECTIVE / INTERVAL HPI: Patient seen and examined at bedside.     ROS: negative unless otherwise stated above.    VITAL SIGNS:  Vital Signs Last 24 Hrs  T(C): 36.6 (31 Dec 2022 08:08), Max: 38.1 (30 Dec 2022 20:51)  T(F): 97.9 (31 Dec 2022 08:08), Max: 100.5 (30 Dec 2022 20:51)  HR: 107 (31 Dec 2022 08:08) (100 - 122)  BP: 137/67 (31 Dec 2022 08:08) (125/82 - 177/79)  BP(mean): --  RR: 22 (31 Dec 2022 08:08) (19 - 26)  SpO2: 97% (31 Dec 2022 08:08) (93% - 100%)    Parameters below as of 31 Dec 2022 08:08  Patient On (Oxygen Delivery Method): nasal cannula  O2 Flow (L/min): 60  O2 Concentration (%): 85        PHYSICAL EXAM:    General: NAD, on nonrebreather  Head: NC/AT; MMM; PERRL; EOMI;  Neck: Supple; no JVD  Respiratory: bronchial breath sounds noted in left lung w/ transmitted upper airway sounds auscultated throughtout  Cardiovascular: Regular rhythm/rate; S1/S2+, no murmurs, rubs gallops   Gastrointestinal: Soft; slightly distended; bowel sounds normal and present, abdominal breathing, no rebound, no guarding  Extremities: WWP; no edema/cyanosis  Neurological: A&Ox0, tracks past midline, moves all extremities   Integument: pearly 1x1cm papular lesion w/ telangiectasias on left nostril, verrucous lesions along head and neck w/ most prominent on the left upper eyelid, seborrheic keratoses along trunk    MEDICATIONS:  MEDICATIONS  (STANDING):  furosemide   Injectable 10 milliGRAM(s) IV Push every 24 hours    MEDICATIONS  (PRN):  acetaminophen     Tablet .. 650 milliGRAM(s) Oral every 6 hours PRN Temp greater or equal to 38C (100.4F), Mild Pain (1 - 3)  glycopyrrolate Injectable 0.2 milliGRAM(s) IV Push every 6 hours PRN Secretions  LORazepam   Injectable 0.5 milliGRAM(s) IV Push every 1 hour PRN Anxiety  morphine  - Injectable 2 milliGRAM(s) IV Push every 2 hours PRN Moderate pain (4-6), Severe pain (7-10), Respiratory rate greater than 22  ondansetron Injectable 4 milliGRAM(s) IV Push every 8 hours PRN Nausea and/or Vomiting      ALLERGIES:  Allergies    penicillins (Rash)    Intolerances        LABS:                        9.2    5.79  )-----------( 194      ( 31 Dec 2022 07:50 )             28.5         144  |  105  |  x   ----------------------------<  121<H>  4.6   |  12<L>  |  12.55<H>    Ca    8.9      31 Dec 2022 07:50    TPro  6.2  /  Alb  3.1<L>  /  TBili  0.4  /  DBili  x   /  AST  21  /  ALT  15  /  AlkPhos  85      PT/INR - ( 30 Dec 2022 21:05 )   PT: 14.5 sec;   INR: 1.22          PTT - ( 30 Dec 2022 21:05 )  PTT:30.6 sec  Urinalysis Basic - ( 30 Dec 2022 23:28 )    Color: Yellow / Appearance: Clear / S.025 / pH: x  Gluc: x / Ketone: NEGATIVE  / Bili: Negative / Urobili: 0.2 E.U./dL   Blood: x / Protein: 30 mg/dL / Nitrite: NEGATIVE   Leuk Esterase: Small / RBC: < 5 /HPF / WBC 5-10 /HPF   Sq Epi: x / Non Sq Epi: 0-5 /HPF / Bacteria: Many /HPF      CAPILLARY BLOOD GLUCOSE          RADIOLOGY & ADDITIONAL TESTS: Reviewed.
SUBJECTIVE/OVERNIGHT EVENTS: Patient is tachypneic with RR 30 to 35.     VITAL SIGNS:  Vital Signs Last 24 Hrs  T(C): 36.7 (31 Dec 2022 11:46), Max: 38.1 (30 Dec 2022 20:51)  T(F): 98.1 (31 Dec 2022 11:46), Max: 100.5 (30 Dec 2022 20:51)  HR: 101 (31 Dec 2022 11:46) (100 - 122)  BP: 105/69 (31 Dec 2022 11:46) (105/69 - 177/79)  BP(mean): 81 (31 Dec 2022 11:46) (81 - 81)  RR: 23 (31 Dec 2022 11:46) (18 - 26)  SpO2: 97% (31 Dec 2022 11:46) (93% - 100%)    Parameters below as of 31 Dec 2022 11:46  Patient On (Oxygen Delivery Method): nasal cannula, high flow  O2 Flow (L/min): 60  O2 Concentration (%): 85    PHYSICAL EXAM:  General: uncomfortable on HFNC  Respiratory: upper airway secretions  Cardiovascular: Regular rhythm/rate; S1/S2+, no murmurs, rubs gallops   Gastrointestinal: Soft; slightly distended; bowel sounds normal and present, abdominal breathing, no rebound, no guarding  Extremities: no edema/cyanosis  Neurological: A&Ox0      MEDICATIONS:  MEDICATIONS  (STANDING):  furosemide   Injectable 10 milliGRAM(s) IV Push every 24 hours  morphine  Infusion. 1 mG/Hr (1 mL/Hr) IV Continuous <Continuous>    MEDICATIONS  (PRN):  acetaminophen     Tablet .. 650 milliGRAM(s) Oral every 6 hours PRN Temp greater or equal to 38C (100.4F), Mild Pain (1 - 3)  glycopyrrolate Injectable 0.2 milliGRAM(s) IV Push every 6 hours PRN Secretions  LORazepam   Injectable 0.5 milliGRAM(s) IV Push every 1 hour PRN Anxiety  morphine  - Injectable 2 milliGRAM(s) IV Push every 2 hours PRN Moderate pain (4-6), Severe pain (7-10), Respiratory rate greater than 22  ondansetron Injectable 4 milliGRAM(s) IV Push every 8 hours PRN Nausea and/or Vomiting      ALLERGIES:  Allergies    penicillins (Rash)    Intolerances        LABS:                        9.2    5.79  )-----------( 194      ( 31 Dec 2022 07:50 )             28.5     12-31    144  |  105  |  156<H>  ----------------------------<  121<H>  4.6   |  12<L>  |  12.55<H>    Ca    8.9      31 Dec 2022 07:50    TPro  6.2  /  Alb  3.1<L>  /  TBili  0.4  /  DBili  x   /  AST  21  /  ALT  15  /  AlkPhos  85  12-31    PT/INR - ( 30 Dec 2022 21:05 )   PT: 14.5 sec;   INR: 1.22          PTT - ( 30 Dec 2022 21:05 )  PTT:30.6 sec    RADIOLOGY & ADDITIONAL TESTS: Reviewed.

## 2022-12-31 NOTE — H&P ADULT - HISTORY OF PRESENT ILLNESS
ED Course:  Vitals: 166/100 mm Hg  |  122 /min  |  26 /min  |  100.5 F rectal  |  94 % mask, nonrebreather 15 L/min  Pertinent Labs:  Imaging:  Interventions:  88M with PMHx alzheimer's dementia (AAOx0-1 baseline), CKD, HTN, HLD, prostate ca, BPH presents with two day duration of cough, SOB, fever, and fatigue. Patient unable to provide history given dementia and history obtained for health aid Morena and daughter Sarah. Yesterday, health aid noted some cough and SOB and today noticed worsening of symptoms with WOB as well as fever and increased lethargy. No recent illness per health aid and daughter. Patient follows nephrologist Dr. Alli Ashraf and decision was made 2 years ago not to pursue dialysis and to medically manage his CKD. Of note since 1 month ago, patient has had decreased ability to walk due to tremors which ,per daughter, nephrologist attributed to worsening of patient's CKD. Patient has not received flu vaccine. Is covid vaccinated but not boosted. Unable to obtain ROS as patient non-verbal.      ED Course:  Vitals: 166/100 mm Hg  |  122 /min  |  26 /min  |  100.5 F rectal  |  94% nonrebreather 15 L/min  Pertinent Labs: Hb 9.0  |  bands 16.2  |  INR 1.2, PT 14.5  |  HCO3 11, AG 29  |  , Cr 13.06  |  influenza A +  Imaging: CXR  Interventions:  1.8L LR  |  Levaquin 500mg  |  Ofirmev

## 2022-12-31 NOTE — CONSULT NOTE ADULT - SUBJECTIVE AND OBJECTIVE BOX
[facility] SERVICE CONSULTATION NOTE    Consult Reason: WOB    HPI: 88M with PMHx alzheimer's dementia (AAOx0-1 baseline), CKD, HTN, HLD, prostate ca, BPH presents with two day duration of cough, SOB, fever, and fatigue. Patient unable to provide history given dementia and history obtained for health aid Morena and daughter Sarah. Yesterday, health aid noted some cough and SOB and today noticed worsening of symptoms with WOB as well as fever and increased lethargy. No recent illness per health aid and daughter. Patient follows nephrologist Dr. Alli Ashraf and decision was made 2 years ago not to pursue dialysis and to medically manage his CKD. Of note since 1 month ago, patient has had decreased ability to walk due to tremors which ,per daughter, nephrologist attributed to worsening of patient's CKD. Patient has not received flu vaccine. Is covid vaccinated but not boosted. Unable to obtain ROS as patient non-verbal.  In the ED:    - VS: Tmax: 100.5 rectal, HR: 122 -> 104, BP: 166/100, RR: 26 -> 20, O2: 94% on NRB -> 6L NC 95%    - Pertinent Labs: Hb 9.0, bands 16.2, INR 1.2, PT 14.5, HCO3 11, AG 29, , Cr 13.06, influenza A +    - Imaging: CXR: Possible RLL infiltrate    - Treatment/interventions: Ofirmev 1g x1, levaquin 500mg x1    PMHx: alzheimer's dementia (AAOx0-1 baseline), CKD, HTN, HLD, prostate ca, BPH  PSHx: hernia repair  SH: Lives at home with 24 hour HHA. No history of smoking, alcohol or recreational drug use. Was able to ambulate with walker but since 1 month ago has not ambulated due to tremors. Retired, worked as an .      VITAL SIGNS:  ICU Vital Signs Last 24 Hrs  T(C): 37.1 (30 Dec 2022 23:40), Max: 38.1 (30 Dec 2022 20:51)  T(F): 98.8 (30 Dec 2022 23:40), Max: 100.5 (30 Dec 2022 20:51)  HR: 102 (30 Dec 2022 23:40) (102 - 122)  BP: 136/74 (30 Dec 2022 23:40) (136/74 - 166/100)  BP(mean): --  ABP: --  ABP(mean): --  RR: 19 (30 Dec 2022 23:40) (19 - 26)  SpO2: 96% (30 Dec 2022 23:40) (94% - 98%)    O2 Parameters below as of 30 Dec 2022 23:40  Patient On (Oxygen Delivery Method): nasal cannula  O2 Flow (L/min): 6        CAPILLARY BLOOD GLUCOSE          PHYSICAL EXAM:  Constitutional: in mild dister  HEENT: NC/AT; PERRL, anicteric sclera; no oropharyngeal erythema or exudates; MMM  Neck: supple, no appreciable JVD  Respiratory: CTA B/L, no W/R/R; respirations appear non-labored, conversive in full sentences  Cardiovascular: +S1/S2, RRR  Gastrointestinal: abdomen soft, NT/ND  Extremities: WWP; no clubbing, cyanosis or edema  Vascular: 2+ radial, femoral, and DP/PT pulses B/L  Dermatologic: skin normal color and turgor; no visible rashes  Neurological:     LABS:                        10.0   9.74  )-----------( 245      ( 30 Dec 2022 21:05 )             30.5     12-30    140  |  104  |  141<H>  ----------------------------<  133<H>  4.5   |  11<L>  |  11.17<H>    Ca    8.5      30 Dec 2022 23:03    TPro  5.8<L>  /  Alb  2.7<L>  /  TBili  0.3  /  DBili  x   /  AST  20  /  ALT  13  /  AlkPhos  85  12-30    PT/INR - ( 30 Dec 2022 21:05 )   PT: 14.5 sec;   INR: 1.22          PTT - ( 30 Dec 2022 21:05 )  PTT:30.6 sec  Lactate, Blood: 2.0 mmol/L (-22 @ 21:05)        Urinalysis Basic - ( 30 Dec 2022 23:28 )    Color: Yellow / Appearance: Clear / S.025 / pH: x  Gluc: x / Ketone: NEGATIVE  / Bili: Negative / Urobili: 0.2 E.U./dL   Blood: x / Protein: 30 mg/dL / Nitrite: NEGATIVE   Leuk Esterase: Small / RBC: < 5 /HPF / WBC 5-10 /HPF   Sq Epi: x / Non Sq Epi: 0-5 /HPF / Bacteria: Many /HPF      Blood Gas Profile w/Lytes - Venous: Performed in Lab (22 @ 23:05)      EKG: Reviewed.    RADIOLOGY & ADDITIONAL TESTS: Reviewed. [facility] SERVICE CONSULTATION NOTE    Consult Reason: WOB    HPI: 88M with PMHx alzheimer's dementia (AAOx0-1 baseline), CKD, HTN, HLD, prostate ca, BPH presents with two day duration of cough, SOB, fever, and fatigue. Patient unable to provide history given dementia and history obtained for health aid Morena and daughter Sarah. Yesterday, health aid noted some cough and SOB and today noticed worsening of symptoms with WOB as well as fever and increased lethargy. No recent illness per health aid and daughter. Patient follows nephrologist Dr. Alli Ashraf and decision was made 2 years ago not to pursue dialysis and to medically manage his CKD. Of note since 1 month ago, patient has had decreased ability to walk due to tremors which ,per daughter, nephrologist attributed to worsening of patient's CKD. Patient has not received flu vaccine. Is covid vaccinated but not boosted. Unable to obtain ROS as patient non-verbal.  In the ED:    - VS: Tmax: 100.5 rectal, HR: 122 -> 104, BP: 166/100, RR: 26 -> 20, O2: 94% on NRB -> 6L NC 95%    - Pertinent Labs: Hb 9.0, bands 16.2, INR 1.2, PT 14.5, HCO3 11, AG 29, , Cr 13.06, influenza A +    - Imaging: CXR: Possible RLL infiltrate    - Treatment/interventions: Ofirmev 1g x1, levaquin 500mg x1    PMHx: alzheimer's dementia (AAOx0-1 baseline), CKD, HTN, HLD, prostate ca, BPH  PSHx: hernia repair  SH: Lives at home with 24 hour HHA. No history of smoking, alcohol or recreational drug use. Was able to ambulate with walker but since 1 month ago has not ambulated due to tremors. Retired, worked as an .      VITAL SIGNS:  ICU Vital Signs Last 24 Hrs  T(C): 37.1 (30 Dec 2022 23:40), Max: 38.1 (30 Dec 2022 20:51)  T(F): 98.8 (30 Dec 2022 23:40), Max: 100.5 (30 Dec 2022 20:51)  HR: 102 (30 Dec 2022 23:40) (102 - 122)  BP: 136/74 (30 Dec 2022 23:40) (136/74 - 166/100)  BP(mean): --  ABP: --  ABP(mean): --  RR: 19 (30 Dec 2022 23:40) (19 - 26)  SpO2: 96% (30 Dec 2022 23:40) (94% - 98%)    O2 Parameters below as of 30 Dec 2022 23:40  Patient On (Oxygen Delivery Method): nasal cannula  O2 Flow (L/min): 6        CAPILLARY BLOOD GLUCOSE          PHYSICAL EXAM:  GENERAL: Pt lying in bed in distress with increased WOB  HEAD:  Atraumatic  EYES: EOMI, PERRL, conjunctiva and sclera clear  ENT: Dry mucous membranes  NECK: Supple, No JVD  CHEST/LUNG: Bilateral wheezing with increased WOB and accessory muscle use  HEART: Regular rate and rhythm; No murmurs, rubs, or gallops  ABDOMEN: Bowel sounds present; mildly distended but non-tender. No guarding or rigidity    EXTREMITIES:  2+ Peripheral Pulses, brisk capillary refill. No clubbing, cyanosis, or edema  NERVOUS SYSTEM:  Alert & Oriented X0, unable to obtain. following commands.  MSK: FROM x 4 extremities  SKIN: left nostril with pearly nodule with telangeictasia    LABS:                        10.0   9.74  )-----------( 245      ( 30 Dec 2022 21:05 )             30.5     12-30    140  |  104  |  141<H>  ----------------------------<  133<H>  4.5   |  11<L>  |  11.17<H>    Ca    8.5      30 Dec 2022 23:03    TPro  5.8<L>  /  Alb  2.7<L>  /  TBili  0.3  /  DBili  x   /  AST  20  /  ALT  13  /  AlkPhos  85  12-30    PT/INR - ( 30 Dec 2022 21:05 )   PT: 14.5 sec;   INR: 1.22          PTT - ( 30 Dec 2022 21:05 )  PTT:30.6 sec  Lactate, Blood: 2.0 mmol/L (1230-22 @ 21:05)        Urinalysis Basic - ( 30 Dec 2022 23:28 )    Color: Yellow / Appearance: Clear / S.025 / pH: x  Gluc: x / Ketone: NEGATIVE  / Bili: Negative / Urobili: 0.2 E.U./dL   Blood: x / Protein: 30 mg/dL / Nitrite: NEGATIVE   Leuk Esterase: Small / RBC: < 5 /HPF / WBC 5-10 /HPF   Sq Epi: x / Non Sq Epi: 0-5 /HPF / Bacteria: Many /HPF      Blood Gas Profile w/Lytes - Venous: Performed in Lab (22 @ 23:05)      EKG: Reviewed.    RADIOLOGY & ADDITIONAL TESTS: Reviewed.

## 2022-12-31 NOTE — PROGRESS NOTE ADULT - PROBLEM SELECTOR PLAN 1
Likely 2/2 influenza A. Met ¾ SIRS (febrile, tachy, tachypneic). Arrived in respiratory distress. No mental status for BiPAP, trial with HF NC unsuccessful.  - HF NC to stay on per family at this time   - c/w PRN morphine, Robinul, and Ativan  - start morphine drip at 1cc/hr (titrate as necessary based on patient's needs)
Likely 2/2 influenza A. Met ¾ SIRS (febrile, tachy, tachypnic) with likely resp source. Arrived with increased WOB and reportedly hypoxic requiring NRB and weaned to 6L NC saturating 95% in ED. However, on exam patient with significant WOB and accessory muscle use on nasal canula and in resp distress. Daughter (HCP) is amenable for patient to trial HFNC and bipap but would not want intubation. Mental status poor for bipap.  - HFNC to stay on per family at this time   - comfort measures with prn morphine and ativan for air hunger  - robinol prn for secretions

## 2022-12-31 NOTE — SWALLOW BEDSIDE ASSESSMENT ADULT - SLP PERTINENT HISTORY OF CURRENT PROBLEM
88M with PMHx alzheimer's dementia (AAOx0-1 baseline), CKD, HTN, HLD, prostate ca, BPH presents with two day duration of cough, SOB, fever, and fatigue found to influenza A and admitted for AHRF and sepsis management. Arrived with increased WOB and reportedly hypoxic requiring NRB and weaned to 6L NC saturating 95% in ED. However, on exam patient with significant WOB and accessory muscle use on nasal canula and in resp distress, placed on HFNC. Daughter (HCP) is amenable for HFNC and bipap but would not want intubation.

## 2022-12-31 NOTE — SWALLOW BEDSIDE ASSESSMENT ADULT - SLP GENERAL OBSERVATIONS
Pt seen at bedside. Pt on HFNC (60L/85%) with noted increased WOB. Nursing is aware. Pt is lethargic, does not follow commands. Not appropriate for trials.

## 2022-12-31 NOTE — PROGRESS NOTE ADULT - PROBLEM SELECTOR PLAN 10
Code status: DNR do not intubate, comfort care
dispo: [facility]  code: DNR/DNI, no dialysis. Comfort Care.

## 2022-12-31 NOTE — PROGRESS NOTE ADULT - PROBLEM SELECTOR PROBLEM 4
Stage 5 chronic kidney disease not on chronic dialysis
Stage 5 chronic kidney disease not on chronic dialysis

## 2022-12-31 NOTE — H&P ADULT - PROBLEM SELECTOR PLAN 4
Follows nephrologist Dr. Alli Ashraf. Decision made 2 years ago to not undergo dialysis. Patient makes urine and incontinent in diaper. On home sodium bicarb 650mg 2 tabs BID, epoetin doug [id number], calcitriol 0.25mcg.  and Cr 13.06 on admission.  Family not interested in dialysis at this time, will continue to discuss    - c/w home meds  - Trend BUN and Cr  - Avoid nephrotic meds and renally dose meds  - Nephrology consult in AM to guide management given no dialysis per GOC

## 2022-12-31 NOTE — H&P ADULT - PROBLEM SELECTOR PLAN 3
EKG sinus tachycardia likely 2/2 increased WOB. Heart rate improved since admission however remains tachycardic  - Monitor HR and management of AHRF as below

## 2022-12-31 NOTE — PROGRESS NOTE ADULT - PROBLEM SELECTOR PLAN 8
- no intervention at this time
Likely 2/2 AOCD vs less likely WILLARD. No active sign of bleed at this time.

## 2023-01-01 VITALS — RESPIRATION RATE: 24 BRPM

## 2023-01-01 LAB
CULTURE RESULTS: SIGNIFICANT CHANGE UP
GRAM STN FLD: SIGNIFICANT CHANGE UP
SPECIMEN SOURCE: SIGNIFICANT CHANGE UP

## 2023-01-01 NOTE — DISCHARGE NOTE FOR THE EXPIRED PATIENT - HOSPITAL COURSE
87 yo M with past medical Hx of Alzheimer's dementia (AAO x 0-1 baseline), CKD, HTN, HLD, prostate ca, BPH presents with two day duration of cough, SOB, fever, and fatigue found to have influenza A and admitted for acute hypoxic respiratory failure and sepsis management. Transitioned to comfort measures.     Called to see patient for unresponsiveness. On exam, the patient did not respond to verbal or physical stimuli. Absent heart and breath sounds. Absent peripheral pulses. Pupils are fixed and dilated. Patient pronounced dead at 11:00. Dr. Oconnell notified. Next of kin/family Sarah Lee notified.

## 2023-01-01 NOTE — ED ADULT NURSE NOTE - NS ED NOTE ABUSE RESPONSE YN
Goal Outcome Evaluation:  VSS today. Remains on HFNC 2L 21%. Tolerating feedings well today. Voiding and stooled after suppository. Mom here and held. Infant resting comfortably between cares.      Yes

## 2023-01-02 LAB
-  CLINDAMYCIN: SIGNIFICANT CHANGE UP
-  CLINDAMYCIN: SIGNIFICANT CHANGE UP
-  ERYTHROMYCIN: SIGNIFICANT CHANGE UP
-  ERYTHROMYCIN: SIGNIFICANT CHANGE UP
-  LINEZOLID: SIGNIFICANT CHANGE UP
-  LINEZOLID: SIGNIFICANT CHANGE UP
-  OXACILLIN: SIGNIFICANT CHANGE UP
-  OXACILLIN: SIGNIFICANT CHANGE UP
-  RIFAMPIN: SIGNIFICANT CHANGE UP
-  RIFAMPIN: SIGNIFICANT CHANGE UP
-  TRIMETHOPRIM/SULFAMETHOXAZOLE: SIGNIFICANT CHANGE UP
-  TRIMETHOPRIM/SULFAMETHOXAZOLE: SIGNIFICANT CHANGE UP
-  VANCOMYCIN: SIGNIFICANT CHANGE UP
-  VANCOMYCIN: SIGNIFICANT CHANGE UP
CULTURE RESULTS: SIGNIFICANT CHANGE UP
METHOD TYPE: SIGNIFICANT CHANGE UP
METHOD TYPE: SIGNIFICANT CHANGE UP
ORGANISM # SPEC MICROSCOPIC CNT: SIGNIFICANT CHANGE UP
ORGANISM # SPEC MICROSCOPIC CNT: SIGNIFICANT CHANGE UP
SPECIMEN SOURCE: SIGNIFICANT CHANGE UP

## 2023-01-05 LAB
CULTURE RESULTS: SIGNIFICANT CHANGE UP
ORGANISM # SPEC MICROSCOPIC CNT: SIGNIFICANT CHANGE UP
ORGANISM # SPEC MICROSCOPIC CNT: SIGNIFICANT CHANGE UP
SPECIMEN SOURCE: SIGNIFICANT CHANGE UP

## 2023-01-11 DIAGNOSIS — E87.20 ACIDOSIS, UNSPECIFIED: ICD-10-CM

## 2023-01-11 DIAGNOSIS — D63.1 ANEMIA IN CHRONIC KIDNEY DISEASE: ICD-10-CM

## 2023-01-11 DIAGNOSIS — J10.08 INFLUENZA DUE TO OTHER IDENTIFIED INFLUENZA VIRUS WITH OTHER SPECIFIED PNEUMONIA: ICD-10-CM

## 2023-01-11 DIAGNOSIS — Z96.642 PRESENCE OF LEFT ARTIFICIAL HIP JOINT: ICD-10-CM

## 2023-01-11 DIAGNOSIS — G30.9 ALZHEIMER'S DISEASE, UNSPECIFIED: ICD-10-CM

## 2023-01-11 DIAGNOSIS — R00.0 TACHYCARDIA, UNSPECIFIED: ICD-10-CM

## 2023-01-11 DIAGNOSIS — N40.0 BENIGN PROSTATIC HYPERPLASIA WITHOUT LOWER URINARY TRACT SYMPTOMS: ICD-10-CM

## 2023-01-11 DIAGNOSIS — J15.9 UNSPECIFIED BACTERIAL PNEUMONIA: ICD-10-CM

## 2023-01-11 DIAGNOSIS — Z88.0 ALLERGY STATUS TO PENICILLIN: ICD-10-CM

## 2023-01-11 DIAGNOSIS — E78.5 HYPERLIPIDEMIA, UNSPECIFIED: ICD-10-CM

## 2023-01-11 DIAGNOSIS — Z66 DO NOT RESUSCITATE: ICD-10-CM

## 2023-01-11 DIAGNOSIS — C44.90 UNSPECIFIED MALIGNANT NEOPLASM OF SKIN, UNSPECIFIED: ICD-10-CM

## 2023-01-11 DIAGNOSIS — F03.90 UNSPECIFIED DEMENTIA WITHOUT BEHAVIORAL DISTURBANCE: ICD-10-CM

## 2023-01-11 DIAGNOSIS — A41.89 OTHER SPECIFIED SEPSIS: ICD-10-CM

## 2023-01-11 DIAGNOSIS — I12.0 HYPERTENSIVE CHRONIC KIDNEY DISEASE WITH STAGE 5 CHRONIC KIDNEY DISEASE OR END STAGE RENAL DISEASE: ICD-10-CM

## 2023-01-11 DIAGNOSIS — J10.1 INFLUENZA DUE TO OTHER IDENTIFIED INFLUENZA VIRUS WITH OTHER RESPIRATORY MANIFESTATIONS: ICD-10-CM

## 2023-01-11 DIAGNOSIS — N18.5 CHRONIC KIDNEY DISEASE, STAGE 5: ICD-10-CM

## 2023-01-11 DIAGNOSIS — Z51.5 ENCOUNTER FOR PALLIATIVE CARE: ICD-10-CM

## 2023-01-11 DIAGNOSIS — C61 MALIGNANT NEOPLASM OF PROSTATE: ICD-10-CM

## 2023-01-11 DIAGNOSIS — J96.01 ACUTE RESPIRATORY FAILURE WITH HYPOXIA: ICD-10-CM

## 2023-01-11 DIAGNOSIS — R65.20 SEVERE SEPSIS WITHOUT SEPTIC SHOCK: ICD-10-CM

## 2023-04-12 NOTE — ED ADULT TRIAGE NOTE - AS HEIGHT TYPE
stated Eat healthy foods you enjoy. Rivaroxaban/Xarelto DOES NOT have a special diet. Limit your alcohol intake.

## 2023-05-17 NOTE — ED PROVIDER NOTE - OBJECTIVE STATEMENT
87 yo male with dementia (sent from [facility])-hx  ckd htn recent ER visit 10 days ago for hematuria / left scrotal infection now with 2 day hx of diff walking due to pain and swelling at left hip- s/p left hip arthroplasty 8/17 at [facility]- no clear hx of falls or trauma  unable to put full weight on LLE  no tingling or numbness  no weakness of upper extremities  no facial droop  no incont of stool or rine  no fever or cough       old hx--    86 y.o. M PMHx closed left femoral neck fracture s/p Hemiarthroplasty of left hip (8/19/2020), Alzheimer's dementia, CKD V, Prostate CA, HTN, hyperlipidemia, Squamous Cell Carcinoma, CRI (baseline Cr 3.04, based off of previous charts), presented w/ hematuria and scrotal redness, found to have a uti and admitted for further evaluation. No

## 2023-05-27 NOTE — ED PROVIDER NOTE - CADM POA CENTRAL LINE
2/2 to likely alcoholic cirrhosis, f/u PETH MELD NA 31 5/15  ---Hep A IgM neg / IgG neg (not immune), HBsAg neg / HBcAb neg/HBcAb IgM neg, HBsAb pos (immune, vaccinated), HCV ab neg.   ---EBV past infection, CMV PCR detectable, but technically under the detection limit. HSV 1/2 PCR neg. Legionella neg.   ---Utox neg, BAL neg. Tylenol, salicylate levels neg.   ---Lipase 58. ISHAN weak pos 1:80, IgG and IgA elevated, ANCA, dsDNA neg, Ceruloplasmin 25  - per hepatology discontinued Rifaximin, and Lactulose 20gm po q 12 hrs titrate to 3-4BMs, recommending albumin BID PPI qd, and IV Vit K 10mg x 3 days, aldactone 300mg daily and lasix 80mg PO daily;   - CT chest 5/25 significant for pulmonary edema  -MRCP no signs of obstruction, no suspicious lesions, cirrhosis and portal hypertension;   repeat infectious workup also pending, s/p paracentesis 5/23 1.7L removed, not indicative of SBP, cultures NGTD- leukocytosis is slowly improving  - s/p EGD  5/25 with hiatal hernia, small varices <5mm  - added IV zosyn 5/27 per ID, awaiting fungal workup; uptrending leukocytosis  - added midodrine 10mg q8hr  -listed for liver transplant 2/2 to likely alcoholic cirrhosis, f/u PET MELD NA 31 5/15  Bili 16, PT/INR 31/2.6  - Liver workup so far at [facility] per Hepatology-:   ---Hep A IgM neg / IgG neg (not immune), HBsAg neg / HBcAb neg/HBcAb IgM neg, HBsAb pos (immune, vaccinated), HCV ab neg.   ---EBV recent or past infection?, CMV PCR detectable, but technically under the detection limit. HSV 1/2 PCR neg. Legionella neg.   ---Utox neg, BAL neg. Tylenol, salicylate levels neg.   ---Lipase 58. ISHAN weak pos 1:80, IgG and IgA elevated, ANCA, dsDNA neg, Ceruloplasmin 25  - c/w Rifaximin, Lactulose 20gm po q 6 hrs titrate to 3-4BMs  - IR consult for therapeutic Paracentesis.  - diuresis with lasix 40mg iv q12hr check ins and outs 2/2 to likely alcoholic cirrhosis, f/u PET MELD NA 31 5/15  Bili 16, PT/INR 31/2.6  - Liver workup so far at [facility] per Hepatology-:   ---Hep A IgM neg / IgG neg (not immune), HBsAg neg / HBcAb neg/HBcAb IgM neg, HBsAb pos (immune, vaccinated), HCV ab neg.   ---EBV past infection, CMV PCR detectable, but technically under the detection limit. HSV 1/2 PCR neg. Legionella neg.   ---Utox neg, BAL neg. Tylenol, salicylate levels neg.   ---Lipase 58. ISHAN weak pos 1:80, IgG and IgA elevated, ANCA, dsDNA neg, Ceruloplasmin 25  - per hepatology discontinued Rifaximin, and Lactulose 20gm po q 12 hrs titrate to 3-4BMs, recommending albumin BID PPI qd, and IV Vit K 10mg x 3 days, aldactone 200mg daily and lasix 80mg PO daily; uptrending T bili, leukocytosis ID follow up  -MRCP pending to r/o obstruction, repeat infectious workup also pending, s/p paracentesis 5/23 1.7L removed, not indicative of SBP, cultures NGTD  - s/p therapeutic paracentesis 2L 5/17 no SBP, repeat abd US on 5/19 showed mild/mod ascites-plan for repeat paracentesis on 5/23 (Tuesday) w/ IR and albumin repletion- plan for EGD prior to SAHARA 2/2 to likely alcoholic cirrhosis, f/u PETH MELD NA 31 5/15  ---Hep A IgM neg / IgG neg (not immune), HBsAg neg / HBcAb neg/HBcAb IgM neg, HBsAb pos (immune, vaccinated), HCV ab neg.   ---EBV past infection, CMV PCR detectable, but technically under the detection limit. HSV 1/2 PCR neg. Legionella neg.   ---Utox neg, BAL neg. Tylenol, salicylate levels neg.   ---Lipase 58. ISHAN weak pos 1:80, IgG and IgA elevated, ANCA, dsDNA neg, Ceruloplasmin 25  - per hepatology discontinued Rifaximin, and Lactulose 20gm po q 12 hrs titrate to 3-4BMs, recommending albumin BID PPI qd, and IV Vit K 10mg x 3 days, aldactone 200mg daily and lasix 80mg PO daily; uptrending T bili, leukocytosis,  -MRCP no signs of obstruction, no suspicious lesions, cirrhosis and portal hypertension;   repeat infectious workup also pending, s/p paracentesis 5/23 1.7L removed, not indicative of SBP, cultures NGTD- leukocytosis is slowly improving   - s/p therapeutic paracentesis 2L 5/17 no SBP, repeat abd US on 5/19 showed mild/mod ascites-plan for repeat paracentesis on 5/23 (Tuesday) w/ IR and albumin repletion- plan for EGD prior to SAHARA 2/2 to likely alcoholic cirrhosis, f/u PET MELD NA 31 5/15  Bili 16, PT/INR 31/2.6  - Liver workup so far at [facility] per Hepatology-:   ---Hep A IgM neg / IgG neg (not immune), HBsAg neg / HBcAb neg/HBcAb IgM neg, HBsAb pos (immune, vaccinated), HCV ab neg.   ---EBV past infection, CMV PCR detectable, but technically under the detection limit. HSV 1/2 PCR neg. Legionella neg.   ---Utox neg, BAL neg. Tylenol, salicylate levels neg.   ---Lipase 58. ISHAN weak pos 1:80, IgG and IgA elevated, ANCA, dsDNA neg, Ceruloplasmin 25  - c/w Rifaximin, Lactulose 20gm po q 6 hrs titrate to 3-4BMs  - s/p therapeutic paracentesis 2L 5/17 no SBP, repeat abd US on 5/19 showed mild/mod ascites-plan for repeat paracentesis on 5/23 (Tuesday) w/ IR 2/2 to likely alcoholic cirrhosis, f/u PET MELD NA 31 5/15  Bili 16, PT/INR 31/2.6  - Liver workup so far at [facility] per Hepatology-:   ---Hep A IgM neg / IgG neg (not immune), HBsAg neg / HBcAb neg/HBcAb IgM neg, HBsAb pos (immune, vaccinated), HCV ab neg.   ---EBV past infection, CMV PCR detectable, but technically under the detection limit. HSV 1/2 PCR neg. Legionella neg.   ---Utox neg, BAL neg. Tylenol, salicylate levels neg.   ---Lipase 58. ISHAN weak pos 1:80, IgG and IgA elevated, ANCA, dsDNA neg, Ceruloplasmin 25  - per hepatology discontinued Rifaximin, and Lactulose 20gm po q 12 hrs titrate to 3-4BMs, recommdning albumin BID and restarting lasix 40 IV daily, PPI qd, and IV Vit K 10mg x 3 days  - s/p therapeutic paracentesis 2L 5/17 no SBP, repeat abd US on 5/19 showed mild/mod ascites-plan for repeat paracentesis on 5/23 (Tuesday) w/ IR and albumin repletion 2/2 to likely alcoholic cirrhosis, f/u PET MELD NA 31 5/15  Bili 16, PT/INR 31/2.6  - Liver workup so far at [facility] per Hepatology-:   ---Hep A IgM neg / IgG neg (not immune), HBsAg neg / HBcAb neg/HBcAb IgM neg, HBsAb pos (immune, vaccinated), HCV ab neg.   ---EBV past infection, CMV PCR detectable, but technically under the detection limit. HSV 1/2 PCR neg. Legionella neg.   ---Utox neg, BAL neg. Tylenol, salicylate levels neg.   ---Lipase 58. ISHAN weak pos 1:80, IgG and IgA elevated, ANCA, dsDNA neg, Ceruloplasmin 25  - c/w Rifaximin, Lactulose 20gm po q 6 hrs titrate to 3-4BMs  - s/p therapeutic paracentesis 2L 5/17 no SB{  - diuresis with lasix 40mg iv daily check ins and outs 2/2 to likely alcoholic cirrhosis, f/u PETH MELD NA 31 5/15  ---Hep A IgM neg / IgG neg (not immune), HBsAg neg / HBcAb neg/HBcAb IgM neg, HBsAb pos (immune, vaccinated), HCV ab neg.   ---EBV past infection, CMV PCR detectable, but technically under the detection limit. HSV 1/2 PCR neg. Legionella neg.   ---Utox neg, BAL neg. Tylenol, salicylate levels neg.   ---Lipase 58. ISHAN weak pos 1:80, IgG and IgA elevated, ANCA, dsDNA neg, Ceruloplasmin 25  - per hepatology discontinued Rifaximin, and Lactulose 20gm po q 12 hrs titrate to 3-4BMs, recommending albumin BID PPI qd, and IV Vit K 10mg x 3 days, aldactone 200mg daily and lasix 80mg PO daily; uptrending T bili, leukocytosis,  - CT chest 5/25 significant for pulmonary edema  -MRCP no signs of obstruction, no suspicious lesions, cirrhosis and portal hypertension;   repeat infectious workup also pending, s/p paracentesis 5/23 1.7L removed, not indicative of SBP, cultures NGTD- leukocytosis is slowly improving  - s/p EGD  5/25 with hiatal hernia, small varices <5mm 2/2 to likely alcoholic cirrhosis, f/u PET MELD NA 31 5/15  Bili 16, PT/INR 31/2.6  - Liver workup so far at [facility] per Hepatology-:   ---Hep A IgM neg / IgG neg (not immune), HBsAg neg / HBcAb neg/HBcAb IgM neg, HBsAb pos (immune, vaccinated), HCV ab neg.   ---EBV past infection, CMV PCR detectable, but technically under the detection limit. HSV 1/2 PCR neg. Legionella neg.   ---Utox neg, BAL neg. Tylenol, salicylate levels neg.   ---Lipase 58. ISHAN weak pos 1:80, IgG and IgA elevated, ANCA, dsDNA neg, Ceruloplasmin 25  - per hepatology discontinued Rifaximin, and Lactulose 20gm po q 12 hrs titrate to 3-4BMs, recommending albumin BID and x/w asix 40 IV daily, PPI qd, and IV Vit K 10mg x 3 days  - s/p therapeutic paracentesis 2L 5/17 no SBP, repeat abd US on 5/19 showed mild/mod ascites-plan for repeat paracentesis on 5/23 (Tuesday) w/ IR and albumin repletion 2/2 to likely alcoholic cirrhosis, f/u PET MELD NA 31 5/15  Bili 16, PT/INR 31/2.6  - Liver workup so far at [facility] per Hepatology-:   ---Hep A IgM neg / IgG neg (not immune), HBsAg neg / HBcAb neg/HBcAb IgM neg, HBsAb pos (immune, vaccinated), HCV ab neg.   ---EBV past infection, CMV PCR detectable, but technically under the detection limit. HSV 1/2 PCR neg. Legionella neg.   ---Utox neg, BAL neg. Tylenol, salicylate levels neg.   ---Lipase 58. ISHAN weak pos 1:80, IgG and IgA elevated, ANCA, dsDNA neg, Ceruloplasmin 25  - per hepatology discontinued Rifaximin, and Lactulose 20gm po q 12 hrs titrate to 3-4BMs, recommending albumin BID and c/w Lasix 40 IV daily, PPI qd, and IV Vit K 10mg x 3 days  - uptrending bili, MRCP pending to r/o obstruction, repeat infectious workup  - s/p therapeutic paracentesis 2L 5/17 no SBP, repeat abd US on 5/19 showed mild/mod ascites-plan for repeat paracentesis on 5/23 (Tuesday) w/ IR and albumin repletion- plan for EGD prior to [facility] 2/2 to likely alcoholic cirrhosis, f/u PET MELD NA 31 5/15  Bili 16, PT/INR 31/2.6  - Liver workup so far at [facility] per Hepatology-:   ---Hep A IgM neg / IgG neg (not immune), HBsAg neg / HBcAb neg/HBcAb IgM neg, HBsAb pos (immune, vaccinated), HCV ab neg.   ---EBV past infection, CMV PCR detectable, but technically under the detection limit. HSV 1/2 PCR neg. Legionella neg.   ---Utox neg, BAL neg. Tylenol, salicylate levels neg.   ---Lipase 58. ISHAN weak pos 1:80, IgG and IgA elevated, ANCA, dsDNA neg, Ceruloplasmin 25  - c/w Rifaximin, Lactulose 20gm po q 6 hrs titrate to 3-4BMs  - s/p therapeutic paracentesis 2L 5/17 no SBP, repeat abd US on 5/19 showed mild/mod ascites-plan for repeat paracentesis on 5/23 (Tuesday) w/ IR No

## 2023-06-12 NOTE — PRE-OP CHECKLIST - HEIGHT IN CM
06/12/2023   --Chart accessed for: Eye Exam  --Care Gaps addressed: Eye Exam  Outreach made to patient via Received Records   HM updated with external April 2023 Eye Exam Report    Health Maintenance Due   Topic Date Due    COVID-19 Vaccine (1) Never done    Foot Exam  Never done    TETANUS VACCINE  Never done    Aspirin/Antiplatelet Therapy  Never done    Shingles Vaccine (1 of 2) Never done    Pneumococcal Vaccines (Age 65+) (3 - PPSV23 if available, else PCV20) 09/16/2020    DEXA Scan  02/05/2022    Diabetes Urine Screening  01/05/2023    Mammogram  03/31/2023        
157.48
157.48

## 2023-07-12 NOTE — ED PROVIDER NOTE - GASTROINTESTINAL, MLM
Render Post-Care Instructions In Note?: no Duration Of Freeze Thaw-Cycle (Seconds): 0 Detail Level: Simple Post-Care Instructions: I reviewed with the patient in detail post-care instructions. Patient is to wear sunprotection, and avoid picking at any of the treated lesions. Pt may apply Vaseline to crusted or scabbing areas. Consent: The patient's consent was obtained including but not limited to risks of crusting, scabbing, blistering, scarring, darker or lighter pigmentary change, recurrence, incomplete removal and infection. Abdomen soft, non-tender, no guarding. Normal

## 2023-09-27 NOTE — ED PROVIDER NOTE - MDM ORDERS SUBMITTED SELECTION
What Type Of Note Output Would You Prefer (Optional)?: Bullet Format Hpi Title: Evaluation of Skin Lesions How Severe Are Your Spot(S)?: mild Have Your Spot(S) Been Treated In The Past?: has not been treated Labs/Imaging Studies/Medications/EKG

## 2023-10-22 NOTE — PATIENT PROFILE ADULT - NSPRESCRALCSCORE_GEN_A_NUR_CAL
PACU ANESTHESIA ADMISSION NOTE      Procedure: EUA/ Suction D&C  Post op diagnosis:  Missed AB    ____  Intubated  TV:______       Rate: ______      FiO2: ______    _x___  Patent Airway    _x___  Full return of protective reflexes    ____  Full recovery from anesthesia / back to baseline status    Vitals:            T:     98.2           BP :  96/58              R:     16         Sat:   99%            P:  79      Mental Status:  _x___ Awake   _____ Alert   _____ Drowsy   _____ Sedated    Nausea/Vomiting:  _x___  NO       ______Yes,   See Post - Op Orders         Pain Scale (0-10):  __0___    Treatment: __ None    __x__ See Post - Op/PCA Orders    Post - Operative Fluids:   __x__ Oral   ____ See Post - Op Orders    Plan: Discharge:   ___Home       __x___Floor     _____Critical Care    _____  Other:_________________    Comments:  No anesthesia issues or complications noted.  Discharge when criteria met.
0

## 2024-03-22 NOTE — PHYSICAL THERAPY INITIAL EVALUATION ADULT - GAIT DISTANCE, PT EVAL
The patient has been re-examined and I agree with the above assessment or I updated with my findings. 4 steps forward, 4 steps back.

## 2024-11-13 NOTE — DISCHARGE NOTE PROVIDER - NSDCCPGOAL_GEN_ALL_CORE_FT
To get better and follow your care plan as instructed. What Type Of Note Output Would You Prefer (Optional)?: Standard Output What Is The Reason For Today's Visit?: Full Body Skin Examination with No Concerns What Is The Reason For Today's Visit? (Being Monitored For X): the development of new lesions

## 2025-07-08 NOTE — PHYSICAL EXAM
Early satiety or feeling full quickly:  Getting Started   Avoid large meals. The larger the meal, the slower the stomach will empty. Try 4-8 smaller meals and snacks.   If decreasing the meal size and increasing the number of “meals” does not work, the next step is to switch to more liquid-type foods. Liquids empty the stomach more easily than solids do. Pureed foods may be better also.   You may need to avoid foods that are high in fat, such as fried or greasy foods. High fat drinks are usually ok - try them and see.   Chew foods well, especially meats. Meats may be easier to eat if ground or puréed.   Avoid high roughage foods because they may be harder for your stomach to empty. This includes skins, tough stalks, seeds, etc.   Avoid raw vegetables. Cook them until they are soft.   Stick to soft fruits such as bananas, canned pears, canned peaches, etc. You can also try cooked or pureed fruits such as smoothies and apple sauce.   Sit up while eating and stay upright for at least 1 hour after you finish. Try taking a nice walk after meals.   If you have diabetes, keep your blood sugar under control. Let your doctor know if your blood sugar runs >200 mg/dL on a regular basis.    Suggested Foods    What to eat What to Avoid   Grains/Starches:  White bread   White bread products (small bagels, English muffins, plain rolls, sanjuana bread, flat bread)   Potato/sweet potato (no skins)   Tortillas / tortilla chips   Low-fiber cereal (rice/corn Chex, corn flakes, rice krispies, cheerios)   Cream of rice/wheat, Grits   White pasta   Egg noodles   Crackers/chips (oyster crackers, pretzels, saltines, baked potato chips)   White rice Grains/Starches:   Whole grain products   Products with nuts and seeds   Products with more than 2 g fiber per  serving   Donuts, cakes, and muffins that are  high in fat   Potato peels   Brown rice   Quinoa   Greasy potato chips (baked is okay)   Deep-fried French fries (baked is okay)   Proteins:    [General Appearance - Alert] : alert [General Appearance - In No Acute Distress] : in no acute distress [Sclera] : the sclera and conjunctiva were normal [PERRL With Normal Accommodation] : pupils were equal in size, round, and reactive to light [Extraocular Movements] : extraocular movements were intact [Outer Ear] : the ears and nose were normal in appearance [Neck Appearance] : the appearance of the neck was normal [Neck Cervical Mass (___cm)] : no neck mass was observed [Jugular Venous Distention Increased] : there was no jugular-venous distention [Auscultation Breath Sounds / Voice Sounds] : lungs were clear to auscultation bilaterally [Heart Rate And Rhythm] : heart rate was normal and rhythm regular [Heart Sounds] : normal S1 and S2 [Heart Sounds Gallop] : no gallops [Murmurs] : no murmurs [Heart Sounds Pericardial Friction Rub] : no pericardial rub [Edema] : there was no peripheral edema [Bowel Sounds] : normal bowel sounds [Abdomen Soft] : soft [Abdomen Tenderness] : non-tender [Abdomen Mass (___ Cm)] : no abdominal mass palpated [Cervical Lymph Nodes Enlarged Posterior Bilaterally] : posterior cervical [Cervical Lymph Nodes Enlarged Anterior Bilaterally] : anterior cervical [Supraclavicular Lymph Nodes Enlarged Bilaterally] : supraclavicular [No CVA Tenderness] : no ~M costovertebral angle tenderness [Nail Clubbing] : no clubbing  or cyanosis of the fingernails [Musculoskeletal - Swelling] : no joint swelling seen [Skin Color & Pigmentation] : normal skin color and pigmentation [Skin Turgor] : normal skin turgor [] : no rash [Deep Tendon Reflexes (DTR)] : deep tendon reflexes were 2+ and symmetric [No Focal Deficits] : no focal deficits [Oriented To Time, Place, And Person] : oriented to person, place, and time [Affect] : the affect was normal